# Patient Record
Sex: MALE | Race: BLACK OR AFRICAN AMERICAN | NOT HISPANIC OR LATINO | Employment: OTHER | RURAL
[De-identification: names, ages, dates, MRNs, and addresses within clinical notes are randomized per-mention and may not be internally consistent; named-entity substitution may affect disease eponyms.]

---

## 2019-05-14 ENCOUNTER — HISTORICAL (OUTPATIENT)
Dept: ADMINISTRATIVE | Facility: HOSPITAL | Age: 64
End: 2019-05-14

## 2019-05-15 LAB
LAB AP CLINICAL INFORMATION: NORMAL
LAB AP DIAGNOSIS - HISTORICAL: NORMAL
LAB AP GROSS PATHOLOGY - HISTORICAL: NORMAL
LAB AP SPECIMEN SUBMITTED - HISTORICAL: NORMAL

## 2020-04-04 ENCOUNTER — HISTORICAL (OUTPATIENT)
Dept: ADMINISTRATIVE | Facility: HOSPITAL | Age: 65
End: 2020-04-04

## 2020-04-04 LAB
ALBUMIN SERPL BCP-MCNC: 3.4 G/DL (ref 3.5–5)
ALBUMIN/GLOB SERPL: 0.7 {RATIO}
ALP SERPL-CCNC: 129 U/L (ref 45–115)
ALT SERPL W P-5'-P-CCNC: 24 U/L (ref 16–61)
AST SERPL W P-5'-P-CCNC: 14 U/L (ref 15–37)
BASOPHILS # BLD AUTO: 0.03 X10E3/UL (ref 0–0.2)
BASOPHILS NFR BLD AUTO: 0.2 % (ref 0–1)
BILIRUB SERPL-MCNC: 0.9 MG/DL (ref 0–1.2)
BUN SERPL-MCNC: 16 MG/DL (ref 7–18)
BUN/CREAT SERPL: 15.1
CALCIUM SERPL-MCNC: 8.7 MG/DL (ref 8.5–10.1)
CHLORIDE SERPL-SCNC: 94 MMOL/L (ref 98–107)
CO2 SERPL-SCNC: 26 MMOL/L (ref 21–32)
CREAT SERPL-MCNC: 1.06 MG/DL (ref 0.7–1.3)
EOSINOPHIL # BLD AUTO: 0.04 X10E3/UL (ref 0–0.5)
EOSINOPHIL NFR BLD AUTO: 0.2 % (ref 1–4)
ERYTHROCYTE [DISTWIDTH] IN BLOOD BY AUTOMATED COUNT: 12.3 % (ref 11.5–14.5)
GLOBULIN SER-MCNC: 4.6 G/DL (ref 2–4)
GLUCOSE SERPL-MCNC: 153 MG/DL (ref 70–105)
GLUCOSE SERPL-MCNC: 224 MG/DL (ref 70–105)
GLUCOSE SERPL-MCNC: 271 MG/DL (ref 74–106)
HCT VFR BLD AUTO: 41.7 % (ref 40–54)
HGB BLD-MCNC: 14.6 G/DL (ref 13.5–18)
IMM GRANULOCYTES # BLD AUTO: 0.05 X10E3/UL (ref 0–0.04)
IMM GRANULOCYTES NFR BLD: 0.3 % (ref 0–0.4)
LACTATE SERPL-SCNC: 1.3 MMOL/L (ref 0.4–2)
LYMPHOCYTES # BLD AUTO: 1.89 X10E3/UL (ref 1–4.8)
LYMPHOCYTES NFR BLD AUTO: 10.3 % (ref 27–41)
MAGNESIUM SERPL-MCNC: 1.8 MG/DL (ref 1.7–2.3)
MCH RBC QN AUTO: 30.9 PG (ref 27–31)
MCHC RBC AUTO-ENTMCNC: 35 G/DL (ref 32–36)
MCV RBC AUTO: 88.2 FL (ref 80–96)
MONOCYTES # BLD AUTO: 1.87 X10E3/UL (ref 0–0.8)
MONOCYTES NFR BLD AUTO: 10.2 % (ref 2–6)
MPC BLD CALC-MCNC: 9.9 FL (ref 9.4–12.4)
NEUTROPHILS # BLD AUTO: 14.51 X10E3/UL (ref 1.8–7.7)
NEUTROPHILS NFR BLD AUTO: 78.8 % (ref 53–65)
PLATELET # BLD AUTO: 282 X10E3/UL (ref 150–400)
POTASSIUM SERPL-SCNC: 3.8 MMOL/L (ref 3.5–5.1)
PROT SERPL-MCNC: 8 G/DL (ref 6.4–8.2)
RBC # BLD AUTO: 4.73 X10E6/UL (ref 4.6–6.2)
SODIUM SERPL-SCNC: 131 MMOL/L (ref 136–145)
WBC # BLD AUTO: 18.39 X10E3/UL (ref 4.5–11)

## 2020-04-05 ENCOUNTER — HISTORICAL (OUTPATIENT)
Dept: ADMINISTRATIVE | Facility: HOSPITAL | Age: 65
End: 2020-04-05

## 2020-04-05 LAB
BASOPHILS # BLD AUTO: 0.03 X10E3/UL (ref 0–0.2)
BASOPHILS NFR BLD AUTO: 0.2 % (ref 0–1)
BUN SERPL-MCNC: 12 MG/DL (ref 7–18)
BUN SERPL-MCNC: 13 MG/DL (ref 7–18)
CALCIUM SERPL-MCNC: 8.7 MG/DL (ref 8.5–10.1)
CALCIUM SERPL-MCNC: 9 MG/DL (ref 8.5–10.1)
CHLORIDE SERPL-SCNC: 100 MMOL/L (ref 98–107)
CHLORIDE SERPL-SCNC: 98 MMOL/L (ref 98–107)
CO2 SERPL-SCNC: 27 MMOL/L (ref 21–32)
CO2 SERPL-SCNC: 27 MMOL/L (ref 21–32)
CREAT SERPL-MCNC: 0.97 MG/DL (ref 0.7–1.3)
CREAT SERPL-MCNC: 0.99 MG/DL (ref 0.7–1.3)
EOSINOPHIL # BLD AUTO: 0.11 X10E3/UL (ref 0–0.5)
EOSINOPHIL NFR BLD AUTO: 0.7 % (ref 1–4)
ERYTHROCYTE [DISTWIDTH] IN BLOOD BY AUTOMATED COUNT: 12.2 % (ref 11.5–14.5)
GLUCOSE SERPL-MCNC: 121 MG/DL (ref 74–106)
GLUCOSE SERPL-MCNC: 140 MG/DL (ref 74–106)
GLUCOSE SERPL-MCNC: 173 MG/DL (ref 70–105)
GLUCOSE SERPL-MCNC: 202 MG/DL (ref 70–105)
GLUCOSE SERPL-MCNC: 95 MG/DL (ref 70–105)
HCT VFR BLD AUTO: 37.1 % (ref 40–54)
HGB BLD-MCNC: 13 G/DL (ref 13.5–18)
IMM GRANULOCYTES # BLD AUTO: 0.05 X10E3/UL (ref 0–0.04)
IMM GRANULOCYTES NFR BLD: 0.3 % (ref 0–0.4)
LYMPHOCYTES # BLD AUTO: 2.24 X10E3/UL (ref 1–4.8)
LYMPHOCYTES NFR BLD AUTO: 13.9 % (ref 27–41)
MCH RBC QN AUTO: 30.9 PG (ref 27–31)
MCHC RBC AUTO-ENTMCNC: 35 G/DL (ref 32–36)
MCV RBC AUTO: 88.1 FL (ref 80–96)
MONOCYTES # BLD AUTO: 1.86 X10E3/UL (ref 0–0.8)
MONOCYTES NFR BLD AUTO: 11.5 % (ref 2–6)
MPC BLD CALC-MCNC: 9.5 FL (ref 9.4–12.4)
NEUTROPHILS # BLD AUTO: 11.82 X10E3/UL (ref 1.8–7.7)
NEUTROPHILS NFR BLD AUTO: 73.4 % (ref 53–65)
PLATELET # BLD AUTO: 278 X10E3/UL (ref 150–400)
POTASSIUM SERPL-SCNC: 3.3 MMOL/L (ref 3.5–5.1)
POTASSIUM SERPL-SCNC: 3.4 MMOL/L (ref 3.5–5.1)
RBC # BLD AUTO: 4.21 X10E6/UL (ref 4.6–6.2)
SODIUM SERPL-SCNC: 133 MMOL/L (ref 136–145)
SODIUM SERPL-SCNC: 136 MMOL/L (ref 136–145)
WBC # BLD AUTO: 16.11 X10E3/UL (ref 4.5–11)

## 2020-04-06 ENCOUNTER — HISTORICAL (OUTPATIENT)
Dept: ADMINISTRATIVE | Facility: HOSPITAL | Age: 65
End: 2020-04-06

## 2020-04-06 LAB
BASOPHILS # BLD AUTO: 0.03 X10E3/UL (ref 0–0.2)
BASOPHILS NFR BLD AUTO: 0.2 % (ref 0–1)
BUN SERPL-MCNC: 11 MG/DL (ref 7–18)
CALCIUM SERPL-MCNC: 9.1 MG/DL (ref 8.5–10.1)
CHLORIDE SERPL-SCNC: 99 MMOL/L (ref 98–107)
CO2 SERPL-SCNC: 28 MMOL/L (ref 21–32)
CREAT SERPL-MCNC: 1.04 MG/DL (ref 0.7–1.3)
EOSINOPHIL # BLD AUTO: 0.05 X10E3/UL (ref 0–0.5)
EOSINOPHIL NFR BLD AUTO: 0.3 % (ref 1–4)
ERYTHROCYTE [DISTWIDTH] IN BLOOD BY AUTOMATED COUNT: 12.1 % (ref 11.5–14.5)
GLUCOSE SERPL-MCNC: 222 MG/DL (ref 70–105)
GLUCOSE SERPL-MCNC: 68 MG/DL (ref 74–106)
HCT VFR BLD AUTO: 36.8 % (ref 40–54)
HGB BLD-MCNC: 12.7 G/DL (ref 13.5–18)
IMM GRANULOCYTES # BLD AUTO: 0.05 X10E3/UL (ref 0–0.04)
IMM GRANULOCYTES NFR BLD: 0.3 % (ref 0–0.4)
LYMPHOCYTES # BLD AUTO: 1.33 X10E3/UL (ref 1–4.8)
LYMPHOCYTES NFR BLD AUTO: 7.6 % (ref 27–41)
MCH RBC QN AUTO: 30.5 PG (ref 27–31)
MCHC RBC AUTO-ENTMCNC: 34.5 G/DL (ref 32–36)
MCV RBC AUTO: 88.2 FL (ref 80–96)
MONOCYTES # BLD AUTO: 2.06 X10E3/UL (ref 0–0.8)
MONOCYTES NFR BLD AUTO: 11.7 % (ref 2–6)
MPC BLD CALC-MCNC: 9.6 FL (ref 9.4–12.4)
NEUTROPHILS # BLD AUTO: 14.05 X10E3/UL (ref 1.8–7.7)
NEUTROPHILS NFR BLD AUTO: 79.9 % (ref 53–65)
PLATELET # BLD AUTO: 309 X10E3/UL (ref 150–400)
POTASSIUM SERPL-SCNC: 3.2 MMOL/L (ref 3.5–5.1)
RBC # BLD AUTO: 4.17 X10E6/UL (ref 4.6–6.2)
SODIUM SERPL-SCNC: 135 MMOL/L (ref 136–145)
WBC # BLD AUTO: 17.57 X10E3/UL (ref 4.5–11)

## 2020-04-07 ENCOUNTER — HISTORICAL (OUTPATIENT)
Dept: ADMINISTRATIVE | Facility: HOSPITAL | Age: 65
End: 2020-04-07

## 2020-04-08 ENCOUNTER — HISTORICAL (OUTPATIENT)
Dept: ADMINISTRATIVE | Facility: HOSPITAL | Age: 65
End: 2020-04-08

## 2020-04-08 LAB
BASOPHILS # BLD AUTO: 0.08 X10E3/UL (ref 0–0.2)
BASOPHILS NFR BLD AUTO: 0.6 % (ref 0–1)
BUN SERPL-MCNC: 10 MG/DL (ref 7–18)
CALCIUM SERPL-MCNC: 8.5 MG/DL (ref 8.5–10.1)
CHLORIDE SERPL-SCNC: 101 MMOL/L (ref 98–107)
CO2 SERPL-SCNC: 29 MMOL/L (ref 21–32)
CREAT SERPL-MCNC: 0.84 MG/DL (ref 0.7–1.3)
EOSINOPHIL # BLD AUTO: 0.34 X10E3/UL (ref 0–0.5)
EOSINOPHIL NFR BLD AUTO: 2.6 % (ref 1–4)
ERYTHROCYTE [DISTWIDTH] IN BLOOD BY AUTOMATED COUNT: 11.9 % (ref 11.5–14.5)
GLUCOSE SERPL-MCNC: 174 MG/DL (ref 70–105)
GLUCOSE SERPL-MCNC: 85 MG/DL (ref 74–106)
HCT VFR BLD AUTO: 35.6 % (ref 40–54)
HGB BLD-MCNC: 11.7 G/DL (ref 13.5–18)
IMM GRANULOCYTES # BLD AUTO: 0.09 X10E3/UL (ref 0–0.04)
IMM GRANULOCYTES NFR BLD: 0.7 % (ref 0–0.4)
LYMPHOCYTES # BLD AUTO: 1.95 X10E3/UL (ref 1–4.8)
LYMPHOCYTES NFR BLD AUTO: 14.9 % (ref 27–41)
MCH RBC QN AUTO: 29.9 PG (ref 27–31)
MCHC RBC AUTO-ENTMCNC: 32.9 G/DL (ref 32–36)
MCV RBC AUTO: 91 FL (ref 80–96)
MONOCYTES # BLD AUTO: 1.59 X10E3/UL (ref 0–0.8)
MONOCYTES NFR BLD AUTO: 12.1 % (ref 2–6)
MPC BLD CALC-MCNC: 9.6 FL (ref 9.4–12.4)
NEUTROPHILS # BLD AUTO: 9.05 X10E3/UL (ref 1.8–7.7)
NEUTROPHILS NFR BLD AUTO: 69.1 % (ref 53–65)
NRBC # BLD AUTO: 0 X10E3/UL (ref 0–0)
NRBC, AUTO (.00): 0 /100 (ref 0–0)
PLATELET # BLD AUTO: 300 X10E3/UL (ref 150–400)
POTASSIUM SERPL-SCNC: 3.6 MMOL/L (ref 3.5–5.1)
RBC # BLD AUTO: 3.91 X10E6/UL (ref 4.6–6.2)
SODIUM SERPL-SCNC: 136 MMOL/L (ref 136–145)
WBC # BLD AUTO: 13.1 X10E3/UL (ref 4.5–11)

## 2020-04-10 LAB
REPORT: 25
REPORT: NORMAL

## 2020-04-13 LAB

## 2020-05-05 ENCOUNTER — HISTORICAL (OUTPATIENT)
Dept: ADMINISTRATIVE | Facility: HOSPITAL | Age: 65
End: 2020-05-05

## 2020-05-05 LAB
BASOPHILS # BLD AUTO: 0.05 X10E3/UL (ref 0–0.2)
BASOPHILS NFR BLD AUTO: 0.3 % (ref 0–1)
BUN SERPL-MCNC: 19 MG/DL (ref 7–18)
CALCIUM SERPL-MCNC: 9 MG/DL (ref 8.5–10.1)
CHLORIDE SERPL-SCNC: 96 MMOL/L (ref 98–107)
CO2 SERPL-SCNC: 28 MMOL/L (ref 21–32)
CREAT SERPL-MCNC: 1.09 MG/DL (ref 0.7–1.3)
EOSINOPHIL # BLD AUTO: 0.06 X10E3/UL (ref 0–0.5)
EOSINOPHIL NFR BLD AUTO: 0.3 % (ref 1–4)
ERYTHROCYTE [DISTWIDTH] IN BLOOD BY AUTOMATED COUNT: 12.3 % (ref 11.5–14.5)
GLUCOSE SERPL-MCNC: 194 MG/DL (ref 70–105)
GLUCOSE SERPL-MCNC: 221 MG/DL (ref 70–105)
GLUCOSE SERPL-MCNC: 225 MG/DL (ref 70–105)
GLUCOSE SERPL-MCNC: 228 MG/DL (ref 74–106)
HCT VFR BLD AUTO: 36.5 % (ref 40–54)
HGB BLD-MCNC: 12.1 G/DL (ref 13.5–18)
IMM GRANULOCYTES # BLD AUTO: 0.17 X10E3/UL (ref 0–0.04)
IMM GRANULOCYTES NFR BLD: 0.9 % (ref 0–0.4)
LYMPHOCYTES # BLD AUTO: 1.85 X10E3/UL (ref 1–4.8)
LYMPHOCYTES NFR BLD AUTO: 10 % (ref 27–41)
MCH RBC QN AUTO: 29.8 PG (ref 27–31)
MCHC RBC AUTO-ENTMCNC: 33.2 G/DL (ref 32–36)
MCV RBC AUTO: 89.9 FL (ref 80–96)
MONOCYTES # BLD AUTO: 1.72 X10E3/UL (ref 0–0.8)
MONOCYTES NFR BLD AUTO: 9.3 % (ref 2–6)
MPC BLD CALC-MCNC: 9 FL (ref 9.4–12.4)
NEUTROPHILS # BLD AUTO: 14.63 X10E3/UL (ref 1.8–7.7)
NEUTROPHILS NFR BLD AUTO: 79.2 % (ref 53–65)
NRBC # BLD AUTO: 0 X10E3/UL (ref 0–0)
NRBC, AUTO (.00): 0 /100 (ref 0–0)
PLATELET # BLD AUTO: 419 X10E3/UL (ref 150–400)
POTASSIUM SERPL-SCNC: 4.1 MMOL/L (ref 3.5–5.1)
RBC # BLD AUTO: 4.06 X10E6/UL (ref 4.6–6.2)
SODIUM SERPL-SCNC: 131 MMOL/L (ref 136–145)
WBC # BLD AUTO: 18.48 X10E3/UL (ref 4.5–11)

## 2020-05-10 ENCOUNTER — HISTORICAL (OUTPATIENT)
Dept: ADMINISTRATIVE | Facility: HOSPITAL | Age: 65
End: 2020-05-10

## 2020-05-10 LAB
ALBUMIN SERPL BCP-MCNC: 2.3 G/DL (ref 3.5–5)
ALBUMIN/GLOB SERPL: 0.5 {RATIO}
ALP SERPL-CCNC: 140 U/L (ref 45–115)
ALT SERPL W P-5'-P-CCNC: 60 U/L (ref 16–61)
APTT PPP: 41.2 SECONDS (ref 25.2–37.3)
AST SERPL W P-5'-P-CCNC: 71 U/L (ref 15–37)
BASOPHILS # BLD AUTO: 0.09 X10E3/UL (ref 0–0.2)
BASOPHILS NFR BLD AUTO: 0.3 % (ref 0–1)
BILIRUB SERPL-MCNC: 0.7 MG/DL (ref 0–1.2)
BUN SERPL-MCNC: 16 MG/DL (ref 7–18)
BUN/CREAT SERPL: 13.8
CALCIUM SERPL-MCNC: 8.3 MG/DL (ref 8.5–10.1)
CHLORIDE SERPL-SCNC: 87 MMOL/L (ref 98–107)
CO2 SERPL-SCNC: 26 MMOL/L (ref 21–32)
CREAT SERPL-MCNC: 1.16 MG/DL (ref 0.7–1.3)
CRP SERPL-MCNC: >18 UG/ML (ref 0–0.8)
EOSINOPHIL # BLD AUTO: 0.01 X10E3/UL (ref 0–0.5)
EOSINOPHIL NFR BLD AUTO: 0 % (ref 1–4)
EOSINOPHIL NFR BLD MANUAL: 1 % (ref 1–4)
ERYTHROCYTE [DISTWIDTH] IN BLOOD BY AUTOMATED COUNT: 12.2 % (ref 11.5–14.5)
GLOBULIN SER-MCNC: 5 G/DL (ref 2–4)
GLUCOSE SERPL-MCNC: 202 MG/DL (ref 74–106)
GLUCOSE SERPL-MCNC: 222 MG/DL (ref 70–105)
HCT VFR BLD AUTO: 32.1 % (ref 40–54)
HGB BLD-MCNC: 10.8 G/DL (ref 13.5–18)
IMM GRANULOCYTES # BLD AUTO: 1.35 X10E3/UL (ref 0–0.04)
IMM GRANULOCYTES NFR BLD: 4.5 % (ref 0–0.4)
INR BLD: 1.22 (ref 0–3.3)
LACTATE SERPL-SCNC: 1.4 MMOL/L (ref 0.4–2)
LYMPHOCYTES # BLD AUTO: 1.05 X10E3/UL (ref 1–4.8)
LYMPHOCYTES NFR BLD AUTO: 3.5 % (ref 27–41)
LYMPHOCYTES NFR BLD MANUAL: 4 % (ref 27–41)
MCH RBC QN AUTO: 29.2 PG (ref 27–31)
MCHC RBC AUTO-ENTMCNC: 33.6 G/DL (ref 32–36)
MCV RBC AUTO: 86.8 FL (ref 80–96)
MONOCYTES # BLD AUTO: 2.05 X10E3/UL (ref 0–0.8)
MONOCYTES NFR BLD AUTO: 6.9 % (ref 2–6)
MONOCYTES NFR BLD MANUAL: 5 % (ref 2–6)
MPC BLD CALC-MCNC: 9.2 FL (ref 9.4–12.4)
NEUTROPHILS # BLD AUTO: 25.18 X10E3/UL (ref 1.8–7.7)
NEUTROPHILS NFR BLD AUTO: 84.8 % (ref 53–65)
NEUTS BAND NFR BLD MANUAL: 7 % (ref 1–5)
NEUTS SEG NFR BLD MANUAL: 83 % (ref 50–62)
NRBC # BLD AUTO: 0 X10E3/UL (ref 0–0)
NRBC, AUTO (.00): 0 /100 (ref 0–0)
OVALOCYTES BLD QL SMEAR: ABNORMAL
PLATELET # BLD AUTO: 512 X10E3/UL (ref 150–400)
PLATELET MORPHOLOGY: ABNORMAL
POLYCHROMASIA BLD QL SMEAR: ABNORMAL
POTASSIUM SERPL-SCNC: 3.7 MMOL/L (ref 3.5–5.1)
PROT SERPL-MCNC: 7.3 G/DL (ref 6.4–8.2)
PROTHROMBIN TIME: 15.5 SECONDS (ref 11.7–14.7)
RBC # BLD AUTO: 3.7 X10E6/UL (ref 4.6–6.2)
SODIUM SERPL-SCNC: 122 MMOL/L (ref 136–145)
WBC # BLD AUTO: 29.73 X10E3/UL (ref 4.5–11)

## 2020-05-11 ENCOUNTER — HISTORICAL (OUTPATIENT)
Dept: ADMINISTRATIVE | Facility: HOSPITAL | Age: 65
End: 2020-05-11

## 2020-05-11 LAB
ERYTHROCYTE [SEDIMENTATION RATE] IN BLOOD BY WESTERGREN METHOD: 106 MM/HR (ref 0–20)
GLUCOSE SERPL-MCNC: 133 MG/DL (ref 70–105)
GLUCOSE SERPL-MCNC: 139 MG/DL (ref 70–105)
GLUCOSE SERPL-MCNC: 196 MG/DL (ref 70–105)
GLUCOSE SERPL-MCNC: 219 MG/DL (ref 70–105)
GLUCOSE SERPL-MCNC: 221 MG/DL (ref 70–105)

## 2020-05-12 ENCOUNTER — HISTORICAL (OUTPATIENT)
Dept: ADMINISTRATIVE | Facility: HOSPITAL | Age: 65
End: 2020-05-12

## 2020-05-12 LAB
BASOPHILS # BLD AUTO: 0.08 X10E3/UL (ref 0–0.2)
BASOPHILS NFR BLD AUTO: 0.3 % (ref 0–1)
BUN SERPL-MCNC: 8 MG/DL (ref 7–18)
CALCIUM SERPL-MCNC: 7.7 MG/DL (ref 8.5–10.1)
CHLORIDE SERPL-SCNC: 98 MMOL/L (ref 98–107)
CO2 SERPL-SCNC: 28 MMOL/L (ref 21–32)
CREAT SERPL-MCNC: 0.83 MG/DL (ref 0.7–1.3)
EOSINOPHIL # BLD AUTO: 0.2 X10E3/UL (ref 0–0.5)
EOSINOPHIL NFR BLD AUTO: 0.8 % (ref 1–4)
ERYTHROCYTE [DISTWIDTH] IN BLOOD BY AUTOMATED COUNT: 12.5 % (ref 11.5–14.5)
GLUCOSE SERPL-MCNC: 121 MG/DL (ref 70–105)
GLUCOSE SERPL-MCNC: 132 MG/DL (ref 70–105)
GLUCOSE SERPL-MCNC: 132 MG/DL (ref 70–105)
GLUCOSE SERPL-MCNC: 181 MG/DL (ref 70–105)
GLUCOSE SERPL-MCNC: 239 MG/DL (ref 70–105)
GLUCOSE SERPL-MCNC: 85 MG/DL (ref 74–106)
GLUCOSE SERPL-MCNC: 99 MG/DL (ref 70–105)
HCT VFR BLD AUTO: 28.3 % (ref 40–54)
HGB BLD-MCNC: 9.2 G/DL (ref 13.5–18)
IMM GRANULOCYTES # BLD AUTO: 0.5 X10E3/UL (ref 0–0.04)
IMM GRANULOCYTES NFR BLD: 2.1 % (ref 0–0.4)
INSULIN SERPL-ACNC: NORMAL U[IU]/ML
LAB AP CLINICAL INFORMATION: NORMAL
LAB AP DIAGNOSIS - HISTORICAL: NORMAL
LAB AP GROSS PATHOLOGY - HISTORICAL: NORMAL
LAB AP SPECIMEN SUBMITTED - HISTORICAL: NORMAL
LYMPHOCYTES # BLD AUTO: 2 X10E3/UL (ref 1–4.8)
LYMPHOCYTES NFR BLD AUTO: 8.3 % (ref 27–41)
MCH RBC QN AUTO: 29.1 PG (ref 27–31)
MCHC RBC AUTO-ENTMCNC: 32.5 G/DL (ref 32–36)
MCV RBC AUTO: 89.6 FL (ref 80–96)
MONOCYTES # BLD AUTO: 1.63 X10E3/UL (ref 0–0.8)
MONOCYTES NFR BLD AUTO: 6.7 % (ref 2–6)
MPC BLD CALC-MCNC: 9.4 FL (ref 9.4–12.4)
NEUTROPHILS # BLD AUTO: 19.76 X10E3/UL (ref 1.8–7.7)
NEUTROPHILS NFR BLD AUTO: 81.8 % (ref 53–65)
NRBC # BLD AUTO: 0 X10E3/UL (ref 0–0)
NRBC, AUTO (.00): 0 /100 (ref 0–0)
PLATELET # BLD AUTO: 505 X10E3/UL (ref 150–400)
POTASSIUM SERPL-SCNC: 3.6 MMOL/L (ref 3.5–5.1)
RBC # BLD AUTO: 3.16 X10E6/UL (ref 4.6–6.2)
SODIUM SERPL-SCNC: 134 MMOL/L (ref 136–145)
WBC # BLD AUTO: 24.17 X10E3/UL (ref 4.5–11)

## 2020-05-13 ENCOUNTER — HISTORICAL (OUTPATIENT)
Dept: ADMINISTRATIVE | Facility: HOSPITAL | Age: 65
End: 2020-05-13

## 2020-05-13 LAB
GLUCOSE SERPL-MCNC: 134 MG/DL (ref 70–105)
GLUCOSE SERPL-MCNC: 156 MG/DL (ref 70–105)
GLUCOSE SERPL-MCNC: 157 MG/DL (ref 70–105)
GLUCOSE SERPL-MCNC: 197 MG/DL (ref 70–105)
VANCOMYCIN TROUGH SERPL-MCNC: 5.9 UG/ML (ref 10–20)

## 2020-05-14 ENCOUNTER — HISTORICAL (OUTPATIENT)
Dept: ADMINISTRATIVE | Facility: HOSPITAL | Age: 65
End: 2020-05-14

## 2020-05-14 LAB
GLUCOSE SERPL-MCNC: 191 MG/DL (ref 70–105)
REPORT: 25
REPORT: 25
REPORT: NORMAL

## 2020-05-15 LAB
REPORT: NORMAL

## 2020-05-16 LAB
REPORT: 25
REPORT: NORMAL

## 2020-05-19 LAB
REPORT: NORMAL

## 2020-05-20 LAB
REPORT: NORMAL

## 2020-08-03 ENCOUNTER — HISTORICAL (OUTPATIENT)
Dept: ADMINISTRATIVE | Facility: HOSPITAL | Age: 65
End: 2020-08-03

## 2020-08-03 LAB
GLUCOSE SERPL-MCNC: 141 MG/DL (ref 70–105)
GLUCOSE SERPL-MCNC: 165 MG/DL (ref 70–105)

## 2020-08-24 ENCOUNTER — HISTORICAL (OUTPATIENT)
Dept: ADMINISTRATIVE | Facility: HOSPITAL | Age: 65
End: 2020-08-24

## 2020-08-24 LAB
ALBUMIN SERPL BCP-MCNC: 2.9 G/DL (ref 3.5–5)
ALP SERPL-CCNC: 144 U/L (ref 45–115)
ALT SERPL W P-5'-P-CCNC: 19 U/L (ref 16–61)
ANION GAP SERPL CALCULATED.3IONS-SCNC: 8 MMOL/L (ref 7–16)
AST SERPL W P-5'-P-CCNC: 14 U/L (ref 15–37)
BILIRUB DIRECT SERPL-MCNC: <0.1 MG/DL (ref 0–0.2)
BILIRUB SERPL-MCNC: 0.1 MG/DL (ref 0–1.2)
BUN SERPL-MCNC: 12 MG/DL (ref 7–18)
CALCIUM SERPL-MCNC: 8.9 MG/DL (ref 8.5–10.1)
CHLORIDE SERPL-SCNC: 104 MMOL/L (ref 98–107)
CO2 SERPL-SCNC: 30 MMOL/L (ref 21–32)
CREAT SERPL-MCNC: 0.76 MG/DL (ref 0.7–1.3)
EST. AVERAGE GLUCOSE BLD GHB EST-MCNC: 167 MG/DL
GLUCOSE SERPL-MCNC: 180 MG/DL (ref 74–106)
HBA1C MFR BLD HPLC: 7.6 % (ref 4.5–6.6)
POTASSIUM SERPL-SCNC: 4.2 MMOL/L (ref 3.5–5.1)
PROT SERPL-MCNC: 8 G/DL (ref 6.4–8.2)
PSA SERPL-MCNC: 1.42 NG/ML (ref 0–4.1)
SODIUM SERPL-SCNC: 138 MMOL/L (ref 136–145)

## 2021-04-01 ENCOUNTER — OFFICE VISIT (OUTPATIENT)
Dept: WOUND CARE | Facility: HOSPITAL | Age: 66
End: 2021-04-01
Attending: NURSE PRACTITIONER
Payer: COMMERCIAL

## 2021-04-01 VITALS
HEART RATE: 80 BPM | HEIGHT: 70 IN | TEMPERATURE: 97 F | SYSTOLIC BLOOD PRESSURE: 136 MMHG | BODY MASS INDEX: 24.34 KG/M2 | WEIGHT: 170 LBS | DIASTOLIC BLOOD PRESSURE: 80 MMHG

## 2021-04-01 DIAGNOSIS — L97.512 NON-PRESSURE CHRONIC ULCER OF OTHER PART OF RIGHT FOOT WITH FAT LAYER EXPOSED: Primary | ICD-10-CM

## 2021-04-01 DIAGNOSIS — E08.621 DIABETIC ULCER OF MIDFOOT ASSOCIATED WITH DIABETES MELLITUS DUE TO UNDERLYING CONDITION, WITH NECROSIS OF BONE, UNSPECIFIED LATERALITY: ICD-10-CM

## 2021-04-01 DIAGNOSIS — D64.9 ANEMIA, UNSPECIFIED TYPE: ICD-10-CM

## 2021-04-01 DIAGNOSIS — L97.404 DIABETIC ULCER OF MIDFOOT ASSOCIATED WITH DIABETES MELLITUS DUE TO UNDERLYING CONDITION, WITH NECROSIS OF BONE, UNSPECIFIED LATERALITY: ICD-10-CM

## 2021-04-01 DIAGNOSIS — I73.9 PERIPHERAL VASCULAR DISEASE, UNSPECIFIED: ICD-10-CM

## 2021-04-01 PROCEDURE — 11042 PR DEBRIDEMENT, SKIN, SUB-Q TISSUE,=<20 SQ CM: ICD-10-PCS | Mod: ,,, | Performed by: NURSE PRACTITIONER

## 2021-04-01 PROCEDURE — 11042 DBRDMT SUBQ TIS 1ST 20SQCM/<: CPT | Mod: ,,, | Performed by: NURSE PRACTITIONER

## 2021-04-01 PROCEDURE — 99212 OFFICE O/P EST SF 10 MIN: CPT

## 2021-04-02 PROBLEM — L97.512 NON-PRESSURE CHRONIC ULCER OF OTHER PART OF RIGHT FOOT WITH FAT LAYER EXPOSED: Status: ACTIVE | Noted: 2021-04-02

## 2021-04-02 PROBLEM — E08.621: Status: ACTIVE | Noted: 2021-04-02

## 2021-04-02 PROBLEM — I73.9 PERIPHERAL VASCULAR DISEASE, UNSPECIFIED: Status: ACTIVE | Noted: 2021-04-02

## 2021-04-02 PROBLEM — L97.404: Status: ACTIVE | Noted: 2021-04-02

## 2021-04-02 PROBLEM — D64.9 ANEMIA: Status: ACTIVE | Noted: 2021-04-02

## 2021-04-14 ENCOUNTER — OFFICE VISIT (OUTPATIENT)
Dept: WOUND CARE | Facility: HOSPITAL | Age: 66
End: 2021-04-14
Attending: NURSE PRACTITIONER
Payer: COMMERCIAL

## 2021-04-14 VITALS
HEIGHT: 70 IN | SYSTOLIC BLOOD PRESSURE: 136 MMHG | DIASTOLIC BLOOD PRESSURE: 78 MMHG | RESPIRATION RATE: 18 BRPM | WEIGHT: 170 LBS | HEART RATE: 82 BPM | BODY MASS INDEX: 24.34 KG/M2

## 2021-04-14 DIAGNOSIS — E11.621 TYPE 2 DIABETES MELLITUS WITH FOOT ULCER, UNSPECIFIED WHETHER LONG TERM INSULIN USE: Primary | ICD-10-CM

## 2021-04-14 DIAGNOSIS — I73.9 PERIPHERAL VASCULAR DISEASE, UNSPECIFIED: ICD-10-CM

## 2021-04-14 DIAGNOSIS — L97.512 NON-PRESSURE CHRONIC ULCER OF OTHER PART OF RIGHT FOOT WITH FAT LAYER EXPOSED: ICD-10-CM

## 2021-04-14 DIAGNOSIS — D64.9 ANEMIA, UNSPECIFIED TYPE: ICD-10-CM

## 2021-04-14 DIAGNOSIS — L97.509 TYPE 2 DIABETES MELLITUS WITH FOOT ULCER, UNSPECIFIED WHETHER LONG TERM INSULIN USE: Primary | ICD-10-CM

## 2021-04-14 PROCEDURE — 11042 PR DEBRIDEMENT, SKIN, SUB-Q TISSUE,=<20 SQ CM: ICD-10-PCS | Mod: ,,, | Performed by: NURSE PRACTITIONER

## 2021-04-14 PROCEDURE — 11042 DBRDMT SUBQ TIS 1ST 20SQCM/<: CPT | Mod: ,,, | Performed by: NURSE PRACTITIONER

## 2021-04-14 PROCEDURE — 87075 CULTR BACTERIA EXCEPT BLOOD: CPT

## 2021-04-14 PROCEDURE — 11042 DBRDMT SUBQ TIS 1ST 20SQCM/<: CPT

## 2021-04-14 PROCEDURE — 27000272 HC BANDAGE KERLIX

## 2021-04-14 PROCEDURE — 87070 CULTURE OTHR SPECIMN AEROBIC: CPT

## 2021-04-14 PROCEDURE — 87076 CULTURE ANAEROBE IDENT EACH: CPT

## 2021-04-14 PROCEDURE — 87077 CULTURE AEROBIC IDENTIFY: CPT | Mod: 91

## 2021-04-17 LAB — REPORT: NORMAL

## 2021-04-18 LAB — REPORT: NORMAL

## 2021-04-21 ENCOUNTER — OFFICE VISIT (OUTPATIENT)
Dept: WOUND CARE | Facility: HOSPITAL | Age: 66
End: 2021-04-21
Attending: NURSE PRACTITIONER
Payer: COMMERCIAL

## 2021-04-21 VITALS
DIASTOLIC BLOOD PRESSURE: 89 MMHG | HEART RATE: 83 BPM | WEIGHT: 170 LBS | HEIGHT: 70 IN | BODY MASS INDEX: 24.34 KG/M2 | SYSTOLIC BLOOD PRESSURE: 147 MMHG | TEMPERATURE: 97 F

## 2021-04-21 DIAGNOSIS — L97.512 NON-PRESSURE CHRONIC ULCER OF OTHER PART OF RIGHT FOOT WITH FAT LAYER EXPOSED: Primary | ICD-10-CM

## 2021-04-21 DIAGNOSIS — I73.9 PERIPHERAL VASCULAR DISEASE, UNSPECIFIED: ICD-10-CM

## 2021-04-21 DIAGNOSIS — L97.414 DIABETIC ULCER OF RIGHT MIDFOOT ASSOCIATED WITH DIABETES MELLITUS DUE TO UNDERLYING CONDITION, WITH NECROSIS OF BONE: ICD-10-CM

## 2021-04-21 DIAGNOSIS — E08.621 DIABETIC ULCER OF RIGHT MIDFOOT ASSOCIATED WITH DIABETES MELLITUS DUE TO UNDERLYING CONDITION, WITH NECROSIS OF BONE: ICD-10-CM

## 2021-04-21 DIAGNOSIS — D64.9 ANEMIA, UNSPECIFIED TYPE: ICD-10-CM

## 2021-04-21 PROCEDURE — 99214 OFFICE O/P EST MOD 30 MIN: CPT

## 2021-04-21 PROCEDURE — 99214 OFFICE O/P EST MOD 30 MIN: CPT | Mod: ,,, | Performed by: NURSE PRACTITIONER

## 2021-04-21 PROCEDURE — 99214 PR OFFICE/OUTPT VISIT, EST, LEVL IV, 30-39 MIN: ICD-10-PCS | Mod: ,,, | Performed by: NURSE PRACTITIONER

## 2021-04-21 PROCEDURE — 27000272 HC BANDAGE KERLIX

## 2021-05-05 ENCOUNTER — OFFICE VISIT (OUTPATIENT)
Dept: WOUND CARE | Facility: HOSPITAL | Age: 66
End: 2021-05-05
Attending: FAMILY MEDICINE
Payer: COMMERCIAL

## 2021-05-05 VITALS
DIASTOLIC BLOOD PRESSURE: 83 MMHG | WEIGHT: 170 LBS | BODY MASS INDEX: 24.34 KG/M2 | HEIGHT: 70 IN | SYSTOLIC BLOOD PRESSURE: 147 MMHG | HEART RATE: 86 BPM | RESPIRATION RATE: 20 BRPM

## 2021-05-05 DIAGNOSIS — L97.512 NON-PRESSURE CHRONIC ULCER OF OTHER PART OF RIGHT FOOT WITH FAT LAYER EXPOSED: Primary | ICD-10-CM

## 2021-05-05 PROCEDURE — 99213 OFFICE O/P EST LOW 20 MIN: CPT

## 2021-05-05 PROCEDURE — 27201914 HC 4-LAYER WRAP

## 2021-05-05 PROCEDURE — 99213 OFFICE O/P EST LOW 20 MIN: CPT | Mod: ,,, | Performed by: FAMILY MEDICINE

## 2021-05-05 PROCEDURE — 99213 PR OFFICE/OUTPT VISIT, EST, LEVL III, 20-29 MIN: ICD-10-PCS | Mod: ,,, | Performed by: FAMILY MEDICINE

## 2021-05-19 ENCOUNTER — OFFICE VISIT (OUTPATIENT)
Dept: WOUND CARE | Facility: CLINIC | Age: 66
End: 2021-05-19
Attending: NURSE PRACTITIONER
Payer: COMMERCIAL

## 2021-05-19 VITALS — SYSTOLIC BLOOD PRESSURE: 134 MMHG | HEART RATE: 76 BPM | DIASTOLIC BLOOD PRESSURE: 80 MMHG

## 2021-05-19 DIAGNOSIS — I73.9 PERIPHERAL VASCULAR DISEASE, UNSPECIFIED: ICD-10-CM

## 2021-05-19 DIAGNOSIS — E08.621 DIABETIC ULCER OF MIDFOOT ASSOCIATED WITH DIABETES MELLITUS DUE TO UNDERLYING CONDITION, WITH NECROSIS OF BONE, UNSPECIFIED LATERALITY: ICD-10-CM

## 2021-05-19 DIAGNOSIS — L97.512 NON-PRESSURE CHRONIC ULCER OF OTHER PART OF RIGHT FOOT WITH FAT LAYER EXPOSED: Primary | ICD-10-CM

## 2021-05-19 DIAGNOSIS — L97.404 DIABETIC ULCER OF MIDFOOT ASSOCIATED WITH DIABETES MELLITUS DUE TO UNDERLYING CONDITION, WITH NECROSIS OF BONE, UNSPECIFIED LATERALITY: ICD-10-CM

## 2021-05-19 DIAGNOSIS — D64.9 ANEMIA, UNSPECIFIED TYPE: ICD-10-CM

## 2021-05-19 PROCEDURE — 99499 NO LOS: ICD-10-PCS | Mod: S$PBB,,, | Performed by: NURSE PRACTITIONER

## 2021-05-19 PROCEDURE — 87076 CULTURE ANAEROBE IDENT EACH: CPT | Mod: ,,, | Performed by: CLINICAL MEDICAL LABORATORY

## 2021-05-19 PROCEDURE — 87076 CULTURE, ANAEROBE: ICD-10-PCS | Mod: ,,, | Performed by: CLINICAL MEDICAL LABORATORY

## 2021-05-19 PROCEDURE — 87070 CULTURE OTHR SPECIMN AEROBIC: CPT | Mod: ,,, | Performed by: CLINICAL MEDICAL LABORATORY

## 2021-05-19 PROCEDURE — 11042 DBRDMT SUBQ TIS 1ST 20SQCM/<: CPT | Mod: S$PBB,,, | Performed by: NURSE PRACTITIONER

## 2021-05-19 PROCEDURE — 87070 CULTURE, WOUND: ICD-10-PCS | Mod: ,,, | Performed by: CLINICAL MEDICAL LABORATORY

## 2021-05-19 PROCEDURE — 87075 CULTR BACTERIA EXCEPT BLOOD: CPT | Mod: ,,, | Performed by: CLINICAL MEDICAL LABORATORY

## 2021-05-19 PROCEDURE — 87186 SC STD MICRODIL/AGAR DIL: CPT | Mod: ,,, | Performed by: CLINICAL MEDICAL LABORATORY

## 2021-05-19 PROCEDURE — 99499 UNLISTED E&M SERVICE: CPT | Mod: S$PBB,,, | Performed by: NURSE PRACTITIONER

## 2021-05-19 PROCEDURE — 87075 CULTURE, ANAEROBE: ICD-10-PCS | Mod: ,,, | Performed by: CLINICAL MEDICAL LABORATORY

## 2021-05-19 PROCEDURE — 11042 DBRDMT SUBQ TIS 1ST 20SQCM/<: CPT | Mod: PBBFAC | Performed by: NURSE PRACTITIONER

## 2021-05-19 PROCEDURE — 87077 CULTURE AEROBIC IDENTIFY: CPT | Mod: ,,, | Performed by: CLINICAL MEDICAL LABORATORY

## 2021-05-19 PROCEDURE — 11042 PR DEBRIDEMENT, SKIN, SUB-Q TISSUE,=<20 SQ CM: ICD-10-PCS | Mod: S$PBB,,, | Performed by: NURSE PRACTITIONER

## 2021-05-19 PROCEDURE — 99213 OFFICE O/P EST LOW 20 MIN: CPT | Mod: PBBFAC,25 | Performed by: NURSE PRACTITIONER

## 2021-05-19 PROCEDURE — 87186 CULTURE, WOUND: ICD-10-PCS | Mod: ,,, | Performed by: CLINICAL MEDICAL LABORATORY

## 2021-05-19 PROCEDURE — 11042 DBRDMT SUBQ TIS 1ST 20SQCM/<: CPT

## 2021-05-19 PROCEDURE — 87077 CULTURE, WOUND: ICD-10-PCS | Mod: ,,, | Performed by: CLINICAL MEDICAL LABORATORY

## 2021-05-22 LAB
MICROORGANISM SPEC CULT: ABNORMAL
MICROORGANISM SPEC CULT: ABNORMAL

## 2021-05-25 LAB
BACTERIA SPEC ANAEROBE CULT: ABNORMAL

## 2021-06-01 ENCOUNTER — OFFICE VISIT (OUTPATIENT)
Dept: WOUND CARE | Facility: CLINIC | Age: 66
End: 2021-06-01
Attending: NURSE PRACTITIONER
Payer: COMMERCIAL

## 2021-06-01 VITALS
RESPIRATION RATE: 20 BRPM | WEIGHT: 170 LBS | TEMPERATURE: 97 F | HEART RATE: 83 BPM | DIASTOLIC BLOOD PRESSURE: 85 MMHG | HEIGHT: 70 IN | SYSTOLIC BLOOD PRESSURE: 159 MMHG | BODY MASS INDEX: 24.34 KG/M2

## 2021-06-01 DIAGNOSIS — L97.512 NON-PRESSURE CHRONIC ULCER OF OTHER PART OF RIGHT FOOT WITH FAT LAYER EXPOSED: Primary | ICD-10-CM

## 2021-06-01 DIAGNOSIS — L97.509 TYPE 2 DIABETES MELLITUS WITH FOOT ULCER, UNSPECIFIED WHETHER LONG TERM INSULIN USE: ICD-10-CM

## 2021-06-01 DIAGNOSIS — I73.9 PERIPHERAL VASCULAR DISEASE, UNSPECIFIED: ICD-10-CM

## 2021-06-01 DIAGNOSIS — E11.621 TYPE 2 DIABETES MELLITUS WITH FOOT ULCER, UNSPECIFIED WHETHER LONG TERM INSULIN USE: ICD-10-CM

## 2021-06-01 PROCEDURE — 99214 OFFICE O/P EST MOD 30 MIN: CPT | Mod: PBBFAC | Performed by: NURSE PRACTITIONER

## 2021-06-01 PROCEDURE — 3051F HG A1C>EQUAL 7.0%<8.0%: CPT | Mod: ,,, | Performed by: NURSE PRACTITIONER

## 2021-06-01 PROCEDURE — 99214 PR OFFICE/OUTPT VISIT, EST, LEVL IV, 30-39 MIN: ICD-10-PCS | Mod: S$PBB,,, | Performed by: NURSE PRACTITIONER

## 2021-06-01 PROCEDURE — 99214 OFFICE O/P EST MOD 30 MIN: CPT | Mod: S$PBB,,, | Performed by: NURSE PRACTITIONER

## 2021-06-01 PROCEDURE — 3051F PR MOST RECENT HEMOGLOBIN A1C LEVEL 7.0 - < 8.0%: ICD-10-PCS | Mod: ,,, | Performed by: NURSE PRACTITIONER

## 2021-06-01 RX ORDER — LEVOFLOXACIN 500 MG/1
1 TABLET, FILM COATED ORAL DAILY
COMMUNITY
Start: 2021-05-25 | End: 2023-10-09 | Stop reason: CLARIF

## 2021-06-02 PROBLEM — L97.509 TYPE 2 DIABETES MELLITUS WITH FOOT ULCER: Status: ACTIVE | Noted: 2021-06-02

## 2021-06-02 PROBLEM — E11.621 TYPE 2 DIABETES MELLITUS WITH FOOT ULCER: Status: ACTIVE | Noted: 2021-06-02

## 2021-06-15 ENCOUNTER — OFFICE VISIT (OUTPATIENT)
Dept: WOUND CARE | Facility: CLINIC | Age: 66
End: 2021-06-15
Attending: NURSE PRACTITIONER
Payer: COMMERCIAL

## 2021-06-15 VITALS
DIASTOLIC BLOOD PRESSURE: 80 MMHG | SYSTOLIC BLOOD PRESSURE: 125 MMHG | WEIGHT: 170 LBS | RESPIRATION RATE: 20 BRPM | HEART RATE: 74 BPM | TEMPERATURE: 97 F | BODY MASS INDEX: 24.34 KG/M2 | HEIGHT: 70 IN

## 2021-06-15 DIAGNOSIS — L97.512 NON-PRESSURE CHRONIC ULCER OF OTHER PART OF RIGHT FOOT WITH FAT LAYER EXPOSED: ICD-10-CM

## 2021-06-15 DIAGNOSIS — L97.509 TYPE 2 DIABETES MELLITUS WITH FOOT ULCER, UNSPECIFIED WHETHER LONG TERM INSULIN USE: ICD-10-CM

## 2021-06-15 DIAGNOSIS — I73.9 PERIPHERAL VASCULAR DISEASE, UNSPECIFIED: Primary | ICD-10-CM

## 2021-06-15 DIAGNOSIS — E11.621 TYPE 2 DIABETES MELLITUS WITH FOOT ULCER, UNSPECIFIED WHETHER LONG TERM INSULIN USE: ICD-10-CM

## 2021-06-15 PROCEDURE — 99214 OFFICE O/P EST MOD 30 MIN: CPT | Mod: PBBFAC | Performed by: NURSE PRACTITIONER

## 2021-06-15 PROCEDURE — 99214 OFFICE O/P EST MOD 30 MIN: CPT | Mod: S$PBB,,, | Performed by: NURSE PRACTITIONER

## 2021-06-15 PROCEDURE — 99214 PR OFFICE/OUTPT VISIT, EST, LEVL IV, 30-39 MIN: ICD-10-PCS | Mod: S$PBB,,, | Performed by: NURSE PRACTITIONER

## 2021-06-15 PROCEDURE — 3051F HG A1C>EQUAL 7.0%<8.0%: CPT | Mod: ,,, | Performed by: NURSE PRACTITIONER

## 2021-06-15 PROCEDURE — 3051F PR MOST RECENT HEMOGLOBIN A1C LEVEL 7.0 - < 8.0%: ICD-10-PCS | Mod: ,,, | Performed by: NURSE PRACTITIONER

## 2021-07-02 ENCOUNTER — OFFICE VISIT (OUTPATIENT)
Dept: WOUND CARE | Facility: CLINIC | Age: 66
End: 2021-07-02
Attending: NURSE PRACTITIONER
Payer: COMMERCIAL

## 2021-07-02 VITALS
WEIGHT: 170 LBS | TEMPERATURE: 97 F | DIASTOLIC BLOOD PRESSURE: 81 MMHG | RESPIRATION RATE: 20 BRPM | SYSTOLIC BLOOD PRESSURE: 150 MMHG | HEART RATE: 88 BPM | HEIGHT: 70 IN | BODY MASS INDEX: 24.34 KG/M2

## 2021-07-02 DIAGNOSIS — L97.512 NON-PRESSURE CHRONIC ULCER OF OTHER PART OF RIGHT FOOT WITH FAT LAYER EXPOSED: ICD-10-CM

## 2021-07-02 DIAGNOSIS — I73.9 PERIPHERAL VASCULAR DISEASE, UNSPECIFIED: Primary | ICD-10-CM

## 2021-07-02 PROCEDURE — 99214 OFFICE O/P EST MOD 30 MIN: CPT | Mod: PBBFAC | Performed by: SURGERY

## 2021-07-02 PROCEDURE — 99214 OFFICE O/P EST MOD 30 MIN: CPT | Mod: S$PBB,,, | Performed by: SURGERY

## 2021-07-02 PROCEDURE — 99214 PR OFFICE/OUTPT VISIT, EST, LEVL IV, 30-39 MIN: ICD-10-PCS | Mod: S$PBB,,, | Performed by: SURGERY

## 2021-07-21 ENCOUNTER — CLINICAL SUPPORT (OUTPATIENT)
Dept: WOUND CARE | Facility: CLINIC | Age: 66
End: 2021-07-21
Payer: COMMERCIAL

## 2021-07-21 VITALS
HEIGHT: 70 IN | RESPIRATION RATE: 20 BRPM | SYSTOLIC BLOOD PRESSURE: 142 MMHG | TEMPERATURE: 97 F | WEIGHT: 170 LBS | HEART RATE: 85 BPM | BODY MASS INDEX: 24.34 KG/M2 | DIASTOLIC BLOOD PRESSURE: 85 MMHG

## 2021-07-21 DIAGNOSIS — E08.621 DIABETIC ULCER OF MIDFOOT ASSOCIATED WITH DIABETES MELLITUS DUE TO UNDERLYING CONDITION, WITH NECROSIS OF BONE, UNSPECIFIED LATERALITY: ICD-10-CM

## 2021-07-21 DIAGNOSIS — L97.404 DIABETIC ULCER OF MIDFOOT ASSOCIATED WITH DIABETES MELLITUS DUE TO UNDERLYING CONDITION, WITH NECROSIS OF BONE, UNSPECIFIED LATERALITY: ICD-10-CM

## 2021-07-21 DIAGNOSIS — D64.9 ANEMIA, UNSPECIFIED TYPE: ICD-10-CM

## 2021-07-21 DIAGNOSIS — L97.509 TYPE 2 DIABETES MELLITUS WITH FOOT ULCER, UNSPECIFIED WHETHER LONG TERM INSULIN USE: ICD-10-CM

## 2021-07-21 DIAGNOSIS — I73.9 PERIPHERAL VASCULAR DISEASE, UNSPECIFIED: Primary | ICD-10-CM

## 2021-07-21 DIAGNOSIS — E08.621 DIABETIC ULCER OF RIGHT MIDFOOT ASSOCIATED WITH DIABETES MELLITUS DUE TO UNDERLYING CONDITION, WITH NECROSIS OF BONE: ICD-10-CM

## 2021-07-21 DIAGNOSIS — L97.512 NON-PRESSURE CHRONIC ULCER OF OTHER PART OF RIGHT FOOT WITH FAT LAYER EXPOSED: ICD-10-CM

## 2021-07-21 DIAGNOSIS — L97.414 DIABETIC ULCER OF RIGHT MIDFOOT ASSOCIATED WITH DIABETES MELLITUS DUE TO UNDERLYING CONDITION, WITH NECROSIS OF BONE: ICD-10-CM

## 2021-07-21 DIAGNOSIS — E11.621 TYPE 2 DIABETES MELLITUS WITH FOOT ULCER, UNSPECIFIED WHETHER LONG TERM INSULIN USE: ICD-10-CM

## 2021-07-21 PROCEDURE — 99213 OFFICE O/P EST LOW 20 MIN: CPT | Mod: PBBFAC

## 2021-08-11 ENCOUNTER — CLINICAL SUPPORT (OUTPATIENT)
Dept: WOUND CARE | Facility: CLINIC | Age: 66
End: 2021-08-11
Payer: COMMERCIAL

## 2021-08-11 VITALS
HEIGHT: 70 IN | SYSTOLIC BLOOD PRESSURE: 161 MMHG | BODY MASS INDEX: 24.34 KG/M2 | HEART RATE: 80 BPM | RESPIRATION RATE: 20 BRPM | DIASTOLIC BLOOD PRESSURE: 71 MMHG | WEIGHT: 170 LBS

## 2021-08-11 DIAGNOSIS — L97.512 NON-PRESSURE CHRONIC ULCER OF OTHER PART OF RIGHT FOOT WITH FAT LAYER EXPOSED: ICD-10-CM

## 2021-08-11 DIAGNOSIS — L97.509 TYPE 2 DIABETES MELLITUS WITH FOOT ULCER, UNSPECIFIED WHETHER LONG TERM INSULIN USE: ICD-10-CM

## 2021-08-11 DIAGNOSIS — E08.621 DIABETIC ULCER OF RIGHT MIDFOOT ASSOCIATED WITH DIABETES MELLITUS DUE TO UNDERLYING CONDITION, WITH NECROSIS OF BONE: ICD-10-CM

## 2021-08-11 DIAGNOSIS — L97.414 DIABETIC ULCER OF RIGHT MIDFOOT ASSOCIATED WITH DIABETES MELLITUS DUE TO UNDERLYING CONDITION, WITH NECROSIS OF BONE: ICD-10-CM

## 2021-08-11 DIAGNOSIS — I73.9 PERIPHERAL VASCULAR DISEASE, UNSPECIFIED: Primary | ICD-10-CM

## 2021-08-11 DIAGNOSIS — E08.621 DIABETIC ULCER OF MIDFOOT ASSOCIATED WITH DIABETES MELLITUS DUE TO UNDERLYING CONDITION, WITH NECROSIS OF BONE, UNSPECIFIED LATERALITY: ICD-10-CM

## 2021-08-11 DIAGNOSIS — L97.404 DIABETIC ULCER OF MIDFOOT ASSOCIATED WITH DIABETES MELLITUS DUE TO UNDERLYING CONDITION, WITH NECROSIS OF BONE, UNSPECIFIED LATERALITY: ICD-10-CM

## 2021-08-11 DIAGNOSIS — E11.621 TYPE 2 DIABETES MELLITUS WITH FOOT ULCER, UNSPECIFIED WHETHER LONG TERM INSULIN USE: ICD-10-CM

## 2021-08-11 DIAGNOSIS — D64.9 ANEMIA, UNSPECIFIED TYPE: ICD-10-CM

## 2021-08-11 PROCEDURE — 99214 OFFICE O/P EST MOD 30 MIN: CPT | Mod: S$PBB,,, | Performed by: FAMILY MEDICINE

## 2021-08-11 PROCEDURE — 99213 OFFICE O/P EST LOW 20 MIN: CPT | Mod: PBBFAC | Performed by: FAMILY MEDICINE

## 2021-08-11 PROCEDURE — 99214 PR OFFICE/OUTPT VISIT, EST, LEVL IV, 30-39 MIN: ICD-10-PCS | Mod: S$PBB,,, | Performed by: FAMILY MEDICINE

## 2021-09-01 ENCOUNTER — OFFICE VISIT (OUTPATIENT)
Dept: WOUND CARE | Facility: CLINIC | Age: 66
End: 2021-09-01
Attending: FAMILY MEDICINE
Payer: COMMERCIAL

## 2021-09-01 DIAGNOSIS — D64.9 ANEMIA, UNSPECIFIED TYPE: ICD-10-CM

## 2021-09-01 DIAGNOSIS — L97.414 DIABETIC ULCER OF RIGHT MIDFOOT ASSOCIATED WITH DIABETES MELLITUS DUE TO UNDERLYING CONDITION, WITH NECROSIS OF BONE: ICD-10-CM

## 2021-09-01 DIAGNOSIS — I73.9 PERIPHERAL VASCULAR DISEASE, UNSPECIFIED: Primary | ICD-10-CM

## 2021-09-01 DIAGNOSIS — E11.621 TYPE 2 DIABETES MELLITUS WITH FOOT ULCER, UNSPECIFIED WHETHER LONG TERM INSULIN USE: ICD-10-CM

## 2021-09-01 DIAGNOSIS — L97.512 NON-PRESSURE CHRONIC ULCER OF OTHER PART OF RIGHT FOOT WITH FAT LAYER EXPOSED: ICD-10-CM

## 2021-09-01 DIAGNOSIS — E08.621 DIABETIC ULCER OF RIGHT MIDFOOT ASSOCIATED WITH DIABETES MELLITUS DUE TO UNDERLYING CONDITION, WITH NECROSIS OF BONE: ICD-10-CM

## 2021-09-01 DIAGNOSIS — L97.404 DIABETIC ULCER OF MIDFOOT ASSOCIATED WITH DIABETES MELLITUS DUE TO UNDERLYING CONDITION, WITH NECROSIS OF BONE, UNSPECIFIED LATERALITY: ICD-10-CM

## 2021-09-01 DIAGNOSIS — L97.509 TYPE 2 DIABETES MELLITUS WITH FOOT ULCER, UNSPECIFIED WHETHER LONG TERM INSULIN USE: ICD-10-CM

## 2021-09-01 DIAGNOSIS — E08.621 DIABETIC ULCER OF MIDFOOT ASSOCIATED WITH DIABETES MELLITUS DUE TO UNDERLYING CONDITION, WITH NECROSIS OF BONE, UNSPECIFIED LATERALITY: ICD-10-CM

## 2021-09-01 PROCEDURE — 99213 OFFICE O/P EST LOW 20 MIN: CPT | Mod: PBBFAC | Performed by: FAMILY MEDICINE

## 2021-09-01 PROCEDURE — 99214 PR OFFICE/OUTPT VISIT, EST, LEVL IV, 30-39 MIN: ICD-10-PCS | Mod: S$PBB,,, | Performed by: FAMILY MEDICINE

## 2021-09-01 PROCEDURE — 99214 OFFICE O/P EST MOD 30 MIN: CPT | Mod: S$PBB,,, | Performed by: FAMILY MEDICINE

## 2021-10-01 ENCOUNTER — OFFICE VISIT (OUTPATIENT)
Dept: WOUND CARE | Facility: CLINIC | Age: 66
End: 2021-10-01
Attending: FAMILY MEDICINE
Payer: COMMERCIAL

## 2021-10-01 VITALS
HEART RATE: 74 BPM | DIASTOLIC BLOOD PRESSURE: 75 MMHG | RESPIRATION RATE: 18 BRPM | TEMPERATURE: 97 F | SYSTOLIC BLOOD PRESSURE: 121 MMHG

## 2021-10-01 DIAGNOSIS — L97.512 NON-PRESSURE CHRONIC ULCER OF OTHER PART OF RIGHT FOOT WITH FAT LAYER EXPOSED: ICD-10-CM

## 2021-10-01 DIAGNOSIS — D64.9 ANEMIA, UNSPECIFIED TYPE: ICD-10-CM

## 2021-10-01 DIAGNOSIS — I73.9 PERIPHERAL VASCULAR DISEASE, UNSPECIFIED: Primary | ICD-10-CM

## 2021-10-01 DIAGNOSIS — L97.509 TYPE 2 DIABETES MELLITUS WITH FOOT ULCER, UNSPECIFIED WHETHER LONG TERM INSULIN USE: ICD-10-CM

## 2021-10-01 DIAGNOSIS — L97.414 DIABETIC ULCER OF RIGHT MIDFOOT ASSOCIATED WITH DIABETES MELLITUS DUE TO UNDERLYING CONDITION, WITH NECROSIS OF BONE: ICD-10-CM

## 2021-10-01 DIAGNOSIS — L97.404 DIABETIC ULCER OF MIDFOOT ASSOCIATED WITH DIABETES MELLITUS DUE TO UNDERLYING CONDITION, WITH NECROSIS OF BONE, UNSPECIFIED LATERALITY: ICD-10-CM

## 2021-10-01 DIAGNOSIS — E08.621 DIABETIC ULCER OF RIGHT MIDFOOT ASSOCIATED WITH DIABETES MELLITUS DUE TO UNDERLYING CONDITION, WITH NECROSIS OF BONE: ICD-10-CM

## 2021-10-01 DIAGNOSIS — E08.621 DIABETIC ULCER OF MIDFOOT ASSOCIATED WITH DIABETES MELLITUS DUE TO UNDERLYING CONDITION, WITH NECROSIS OF BONE, UNSPECIFIED LATERALITY: ICD-10-CM

## 2021-10-01 DIAGNOSIS — E11.621 TYPE 2 DIABETES MELLITUS WITH FOOT ULCER, UNSPECIFIED WHETHER LONG TERM INSULIN USE: ICD-10-CM

## 2021-10-01 PROCEDURE — 99214 PR OFFICE/OUTPT VISIT, EST, LEVL IV, 30-39 MIN: ICD-10-PCS | Mod: S$PBB,,, | Performed by: SURGERY

## 2021-10-01 PROCEDURE — 99214 OFFICE O/P EST MOD 30 MIN: CPT | Mod: S$PBB,,, | Performed by: SURGERY

## 2021-10-01 PROCEDURE — 99212 OFFICE O/P EST SF 10 MIN: CPT | Mod: PBBFAC | Performed by: SURGERY

## 2023-10-09 ENCOUNTER — HOSPITAL ENCOUNTER (EMERGENCY)
Facility: HOSPITAL | Age: 68
Discharge: ANOTHER HOSPITAL | End: 2023-10-09
Attending: FAMILY MEDICINE
Payer: COMMERCIAL

## 2023-10-09 VITALS
TEMPERATURE: 98 F | DIASTOLIC BLOOD PRESSURE: 106 MMHG | WEIGHT: 140 LBS | BODY MASS INDEX: 20.04 KG/M2 | OXYGEN SATURATION: 100 % | RESPIRATION RATE: 19 BRPM | HEIGHT: 70 IN | HEART RATE: 88 BPM | SYSTOLIC BLOOD PRESSURE: 146 MMHG

## 2023-10-09 DIAGNOSIS — S91.309A OPEN WOUND OF DORSUM OF FOOT: Primary | ICD-10-CM

## 2023-10-09 DIAGNOSIS — S91.104A OPEN WOUND OF LESSER TOE OF RIGHT FOOT WITHOUT DAMAGE TO NAIL, INITIAL ENCOUNTER: ICD-10-CM

## 2023-10-09 DIAGNOSIS — E87.1 HYPONATREMIA: ICD-10-CM

## 2023-10-09 DIAGNOSIS — D72.829 LEUKOCYTOSIS, UNSPECIFIED TYPE: ICD-10-CM

## 2023-10-09 DIAGNOSIS — R07.9 CHEST PAIN: ICD-10-CM

## 2023-10-09 DIAGNOSIS — M86.9 OSTEOMYELITIS OF RIGHT FOOT, UNSPECIFIED TYPE: ICD-10-CM

## 2023-10-09 DIAGNOSIS — E08.620: ICD-10-CM

## 2023-10-09 DIAGNOSIS — L03.115 CELLULITIS OF RIGHT LOWER EXTREMITY: ICD-10-CM

## 2023-10-09 LAB
ALBUMIN SERPL BCP-MCNC: 3.1 G/DL (ref 3.5–5)
ALBUMIN/GLOB SERPL: 0.6 {RATIO}
ALP SERPL-CCNC: 157 U/L (ref 45–115)
ALT SERPL W P-5'-P-CCNC: 22 U/L (ref 16–61)
ANION GAP SERPL CALCULATED.3IONS-SCNC: 14 MMOL/L (ref 7–16)
AST SERPL W P-5'-P-CCNC: 24 U/L (ref 15–37)
BASOPHILS # BLD AUTO: 0.07 K/UL (ref 0–0.2)
BASOPHILS NFR BLD AUTO: 0.3 % (ref 0–1)
BILIRUB SERPL-MCNC: 0.6 MG/DL (ref ?–1.2)
BUN SERPL-MCNC: 23 MG/DL (ref 7–18)
BUN/CREAT SERPL: 18 (ref 6–20)
CALCIUM SERPL-MCNC: 9.2 MG/DL (ref 8.5–10.1)
CHLORIDE SERPL-SCNC: 91 MMOL/L (ref 98–107)
CO2 SERPL-SCNC: 29 MMOL/L (ref 21–32)
CREAT SERPL-MCNC: 1.26 MG/DL (ref 0.7–1.3)
DIFFERENTIAL METHOD BLD: ABNORMAL
EGFR (NO RACE VARIABLE) (RUSH/TITUS): 63 ML/MIN/1.73M2
EOSINOPHIL # BLD AUTO: 0.02 K/UL (ref 0–0.5)
EOSINOPHIL NFR BLD AUTO: 0.1 % (ref 1–4)
ERYTHROCYTE [DISTWIDTH] IN BLOOD BY AUTOMATED COUNT: 13.3 % (ref 11.5–14.5)
EST. AVERAGE GLUCOSE BLD GHB EST-MCNC: 140 MG/DL
GLOBULIN SER-MCNC: 5.4 G/DL (ref 2–4)
GLUCOSE SERPL-MCNC: 182 MG/DL (ref 70–105)
GLUCOSE SERPL-MCNC: 186 MG/DL (ref 74–106)
HBA1C MFR BLD HPLC: 6.8 % (ref 4.5–6.6)
HCT VFR BLD AUTO: 41 % (ref 40–54)
HGB BLD-MCNC: 14.1 G/DL (ref 13.5–18)
IMM GRANULOCYTES # BLD AUTO: 0.17 K/UL (ref 0–0.04)
IMM GRANULOCYTES NFR BLD: 0.8 % (ref 0–0.4)
LACTATE SERPL-SCNC: 1.4 MMOL/L (ref 0.4–2)
LYMPHOCYTES # BLD AUTO: 1.38 K/UL (ref 1–4.8)
LYMPHOCYTES NFR BLD AUTO: 6.7 % (ref 27–41)
MCH RBC QN AUTO: 29.2 PG (ref 27–31)
MCHC RBC AUTO-ENTMCNC: 34.4 G/DL (ref 32–36)
MCV RBC AUTO: 84.9 FL (ref 80–96)
MONOCYTES # BLD AUTO: 2.05 K/UL (ref 0–0.8)
MONOCYTES NFR BLD AUTO: 10 % (ref 2–6)
MPC BLD CALC-MCNC: 9.1 FL (ref 9.4–12.4)
NEUTROPHILS # BLD AUTO: 16.81 K/UL (ref 1.8–7.7)
NEUTROPHILS NFR BLD AUTO: 82.1 % (ref 53–65)
NRBC # BLD AUTO: 0 X10E3/UL
NRBC, AUTO (.00): 0 %
NT-PROBNP SERPL-MCNC: 491 PG/ML (ref 1–125)
PLATELET # BLD AUTO: 365 K/UL (ref 150–400)
POTASSIUM SERPL-SCNC: 3.6 MMOL/L (ref 3.5–5.1)
PROT SERPL-MCNC: 8.5 G/DL (ref 6.4–8.2)
RBC # BLD AUTO: 4.83 M/UL (ref 4.6–6.2)
SODIUM SERPL-SCNC: 130 MMOL/L (ref 136–145)
TROPONIN I SERPL DL<=0.01 NG/ML-MCNC: 7.1 PG/ML
WBC # BLD AUTO: 20.5 K/UL (ref 4.5–11)

## 2023-10-09 PROCEDURE — 94761 N-INVAS EAR/PLS OXIMETRY MLT: CPT

## 2023-10-09 PROCEDURE — 27000221 HC OXYGEN, UP TO 24 HOURS

## 2023-10-09 PROCEDURE — 93010 ELECTROCARDIOGRAM REPORT: CPT | Mod: ,,, | Performed by: INTERNAL MEDICINE

## 2023-10-09 PROCEDURE — 99285 EMERGENCY DEPT VISIT HI MDM: CPT | Mod: 25

## 2023-10-09 PROCEDURE — 93010 EKG 12-LEAD: ICD-10-PCS | Mod: ,,, | Performed by: INTERNAL MEDICINE

## 2023-10-09 PROCEDURE — 84484 ASSAY OF TROPONIN QUANT: CPT | Performed by: FAMILY MEDICINE

## 2023-10-09 PROCEDURE — 80053 COMPREHEN METABOLIC PANEL: CPT | Performed by: FAMILY MEDICINE

## 2023-10-09 PROCEDURE — 83605 ASSAY OF LACTIC ACID: CPT | Performed by: FAMILY MEDICINE

## 2023-10-09 PROCEDURE — 83036 HEMOGLOBIN GLYCOSYLATED A1C: CPT | Performed by: FAMILY MEDICINE

## 2023-10-09 PROCEDURE — 87040 BLOOD CULTURE FOR BACTERIA: CPT | Performed by: FAMILY MEDICINE

## 2023-10-09 PROCEDURE — 96365 THER/PROPH/DIAG IV INF INIT: CPT

## 2023-10-09 PROCEDURE — 83880 ASSAY OF NATRIURETIC PEPTIDE: CPT | Performed by: FAMILY MEDICINE

## 2023-10-09 PROCEDURE — 63600175 PHARM REV CODE 636 W HCPCS: Performed by: FAMILY MEDICINE

## 2023-10-09 PROCEDURE — 96376 TX/PRO/DX INJ SAME DRUG ADON: CPT

## 2023-10-09 PROCEDURE — 25000003 PHARM REV CODE 250: Performed by: FAMILY MEDICINE

## 2023-10-09 PROCEDURE — 85025 COMPLETE CBC W/AUTO DIFF WBC: CPT | Performed by: FAMILY MEDICINE

## 2023-10-09 PROCEDURE — 25000242 PHARM REV CODE 250 ALT 637 W/ HCPCS: Performed by: FAMILY MEDICINE

## 2023-10-09 PROCEDURE — 99291 PR CRITICAL CARE, E/M 30-74 MINUTES: ICD-10-PCS | Mod: ,,, | Performed by: FAMILY MEDICINE

## 2023-10-09 PROCEDURE — 99291 CRITICAL CARE FIRST HOUR: CPT | Mod: ,,, | Performed by: FAMILY MEDICINE

## 2023-10-09 PROCEDURE — 82962 GLUCOSE BLOOD TEST: CPT

## 2023-10-09 PROCEDURE — 96375 TX/PRO/DX INJ NEW DRUG ADDON: CPT

## 2023-10-09 PROCEDURE — 87070 CULTURE OTHR SPECIMN AEROBIC: CPT | Performed by: FAMILY MEDICINE

## 2023-10-09 PROCEDURE — 93005 ELECTROCARDIOGRAM TRACING: CPT

## 2023-10-09 RX ORDER — ASPIRIN 81 MG/1
325 TABLET ORAL ONCE
Status: DISCONTINUED | OUTPATIENT
Start: 2023-10-09 | End: 2023-10-09

## 2023-10-09 RX ORDER — MORPHINE SULFATE 2 MG/ML
2 INJECTION, SOLUTION INTRAMUSCULAR; INTRAVENOUS
Status: COMPLETED | OUTPATIENT
Start: 2023-10-09 | End: 2023-10-09

## 2023-10-09 RX ORDER — MORPHINE SULFATE 4 MG/ML
4 INJECTION, SOLUTION INTRAMUSCULAR; INTRAVENOUS
Status: COMPLETED | OUTPATIENT
Start: 2023-10-09 | End: 2023-10-09

## 2023-10-09 RX ORDER — NITROGLYCERIN 0.4 MG/1
0.4 TABLET SUBLINGUAL
Status: COMPLETED | OUTPATIENT
Start: 2023-10-09 | End: 2023-10-09

## 2023-10-09 RX ORDER — ASPIRIN 325 MG
325 TABLET ORAL
Status: COMPLETED | OUTPATIENT
Start: 2023-10-09 | End: 2023-10-09

## 2023-10-09 RX ORDER — FLASH GLUCOSE SENSOR
KIT MISCELLANEOUS
COMMUNITY
Start: 2023-10-03

## 2023-10-09 RX ORDER — INSULIN DEGLUDEC 100 U/ML
35 INJECTION, SOLUTION SUBCUTANEOUS DAILY
COMMUNITY
Start: 2023-07-26

## 2023-10-09 RX ORDER — PEN NEEDLE, DIABETIC 32GX 5/32"
NEEDLE, DISPOSABLE MISCELLANEOUS
COMMUNITY
Start: 2023-04-27

## 2023-10-09 RX ADMIN — MORPHINE SULFATE 2 MG: 2 INJECTION, SOLUTION INTRAMUSCULAR; INTRAVENOUS at 07:10

## 2023-10-09 RX ADMIN — DEXTROSE MONOHYDRATE 1 G: 5 INJECTION INTRAVENOUS at 09:10

## 2023-10-09 RX ADMIN — NITROGLYCERIN 0.4 MG: 0.4 TABLET SUBLINGUAL at 07:10

## 2023-10-09 RX ADMIN — ASPIRIN 325 MG ORAL TABLET 325 MG: 325 PILL ORAL at 07:10

## 2023-10-09 RX ADMIN — MORPHINE SULFATE 4 MG: 4 INJECTION, SOLUTION INTRAMUSCULAR; INTRAVENOUS at 11:10

## 2023-10-09 NOTE — ED PROVIDER NOTES
VEncounter Date: 10/9/2023       History     Chief Complaint   Patient presents with    Chest Pain     68 year old WM diabetic presents with a right foot plantar lateral 5 cm wound and chest pain.  The chest pain is 10/10, left sided, substernal x 1 hour.  The foot wound has been present for over 2 years.  The pain is partially relieved with rest, worse with exertion.  He has had several debridements and toe amputations of the right foot. He has no fever but he does have right foot pain 4/10 and decreased sensation. The foot xray shows several Charcot changes.    The history is provided by the patient.     Review of patient's allergies indicates:   Allergen Reactions    Nubain [nalbuphine]      Past Medical History:   Diagnosis Date    Anemia     Arthritis     Diabetes mellitus, type 2     Dry skin     Hypertension     Immune deficiency disorder     Muscle pain     Neuropathy     Peripheral vascular disease      Past Surgical History:   Procedure Laterality Date    APPENDECTOMY      L index finger repair from a cat bite during childhood      Surgical debridement of R foot 8/2020       Family History   Problem Relation Age of Onset    Diabetes Mother     Hypertension Father      Social History     Tobacco Use    Smoking status: Never    Smokeless tobacco: Never   Substance Use Topics    Alcohol use: Not Currently    Drug use: Never     Review of Systems   Constitutional:  Positive for activity change, appetite change and fatigue.   HENT:  Negative for congestion.    Eyes:  Negative for discharge.   Respiratory:  Positive for chest tightness and shortness of breath.    Cardiovascular:  Positive for chest pain, palpitations and leg swelling.   Gastrointestinal:  Negative for abdominal pain.   Endocrine: Negative for cold intolerance.   Genitourinary:  Negative for difficulty urinating.   Musculoskeletal:  Positive for arthralgias, gait problem, joint swelling and myalgias.   Skin:  Positive for rash and wound.    Allergic/Immunologic: Negative for environmental allergies.   Neurological:  Negative for dizziness.   Hematological:  Negative for adenopathy.   Psychiatric/Behavioral:  Negative for behavioral problems.    All other systems reviewed and are negative.      Physical Exam     Initial Vitals [10/09/23 0632]   BP Pulse Resp Temp SpO2   (!) 161/95 98 18 98.2 °F (36.8 °C) 99 %      MAP       --         Physical Exam    Constitutional: He appears well-developed and well-nourished.   HENT:   Head: Normocephalic and atraumatic.   Eyes: EOM are normal. Pupils are equal, round, and reactive to light.   Neck: Neck supple.   Normal range of motion.  Cardiovascular:  Normal rate.           Abdominal: Bowel sounds are normal.   Musculoskeletal:      Cervical back: Normal range of motion and neck supple.      Comments: Right foot is tender.  Lateral right foot wound 7 cm, red weeping, tender. Decreased plantar sensation. Decreased pedal pulses 1+     Neurological: He is alert and oriented to person, place, and time. He has normal strength. No cranial nerve deficit.   Skin: Skin is warm. Rash noted. There is erythema.         Medical Screening Exam   See Full Note    ED Course   Procedures  Labs Reviewed   COMPREHENSIVE METABOLIC PANEL - Abnormal; Notable for the following components:       Result Value    Sodium 130 (*)     Chloride 91 (*)     Glucose 186 (*)     BUN 23 (*)     Total Protein 8.5 (*)     Albumin 3.1 (*)     Globulin 5.4 (*)     Alk Phos 157 (*)     All other components within normal limits   NT-PRO NATRIURETIC PEPTIDE - Abnormal; Notable for the following components:    ProBNP 491 (*)     All other components within normal limits   CBC WITH DIFFERENTIAL - Abnormal; Notable for the following components:    WBC 20.50 (*)     MPV 9.1 (*)     Neutrophils % 82.1 (*)     Lymphocytes % 6.7 (*)     Monocytes % 10.0 (*)     Eosinophils % 0.1 (*)     Immature Granulocytes % 0.8 (*)     Neutrophils, Abs 16.81 (*)      Monocytes, Absolute 2.05 (*)     Immature Granulocytes, Absolute 0.17 (*)     All other components within normal limits   POCT GLUCOSE MONITORING CONTINUOUS - Abnormal; Notable for the following components:    POC Glucose 182 (*)     All other components within normal limits   TROPONIN I - Normal   CULTURE, BLOOD   CULTURE, BLOOD   CULTURE, WOUND   CBC W/ AUTO DIFFERENTIAL    Narrative:     The following orders were created for panel order CBC auto differential.  Procedure                               Abnormality         Status                     ---------                               -----------         ------                     CBC with Differential[[id number]]       Abnormal            Final result                 Please view results for these tests on the individual orders.   HEMOGLOBIN A1C   LACTIC ACID, PLASMA     EKG Readings: (Independently Interpreted)   Initial Reading: No STEMI. Rhythm: Sinus Tachycardia. Clinical Impression: Sinus Tachycardia Other Impression: Sinus tachycardia   Vent rate 105,  QRS 88, Sinus tachycardia       Imaging Results              X-Ray Foot Complete Right (Final result)  Result time 10/09/23 08:55:50      Final result by Fredi Whittaker DO (10/09/23 08:55:50)                   Impression:      As above.    Point of Service: [facility]      Electronically signed by: Fredi Whittaker  Date:    10/09/2023  Time:    08:55               Narrative:    EXAMINATION:  XR FOOT COMPLETE 3 VIEW RIGHT    CLINICAL HISTORY:  Unspecified open wound of right lesser toe(s) without damage to nail, initial encounter    COMPARISON:  Right foot x-ray May 10, 2020    TECHNIQUE:  Frontal, lateral, and oblique views of the right foot.    FINDINGS:  Prior amputation of the 2nd and 5th digits to the mid to distal metatarsal level.  Erosive changes demonstrated about the MTP joints of the 1st, 3rd, and 4th digits as well as the mid 5th metatarsal suspicious for sequela of  osteomyelitis.  There is dislocation at the 1st, 3rd, and 4th MTP joints.  Pathologic fracturing of the 4th metatarsal.  Soft tissue swelling.  Plantar and posterior calcaneal spurring.                                       X-Ray Chest AP Portable (Final result)  Result time 10/09/23 07:59:44      Final result by Fredi Whittaker DO (10/09/23 07:59:44)                   Impression:      No acute cardiopulmonary process demonstrated.    Point of Service: [facility]      Electronically signed by: Fredi Whittaker  Date:    10/09/2023  Time:    07:59               Narrative:    EXAMINATION:  XR CHEST AP PORTABLE    CLINICAL HISTORY:  Chest pain, unspecified    COMPARISON:  None    TECHNIQUE:  Frontal view/views of the chest.    FINDINGS:  Heart size appears within normal limits.  Chronic change of the lungs without focal consolidation, pleural effusion, or pneumothorax.  Visualized osseous and surrounding soft tissue structures demonstrate no acute abnormality.                                    X-Rays:   Independently Interpreted Readings:   Other Readings:  Multiple fractures, osteomyelitis, right foot    Medications   cefTRIAXone (ROCEPHIN) 1 g in dextrose 5 % in water (D5W) 100 mL IVPB (MB+) (has no administration in time range)   nitroGLYCERIN SL tablet 0.4 mg (0.4 mg Sublingual Given 10/9/23 0702)   aspirin tablet 325 mg (325 mg Oral Given 10/9/23 0703)   morphine injection 2 mg (2 mg Intravenous Given 10/9/23 0719)     Medical Decision Making  1. Chest pain, partially relieved by morphine, O2, NTG, ASA. Normal troponin  2. Osteomyelitis right diabetic foot with leukocytosis.  Cultures obtained.  Rocephin until cultures can fine tune antibiotic. Transfer.  Diabetes mellitus controlled @ 182 by Tresiba insulin  Discussed with Dr Pool @ [facility] re transfer.    Dr York at [facility] has accepted him for transfer for chest pain and right foot diabetic osteomyelitis.    Amount and/or Complexity of Data  Reviewed  Labs: ordered.  Radiology: ordered.    Risk  OTC drugs.  Prescription drug management.  Decision regarding hospitalization.    Critical Care  Total time providing critical care: 33 minutes                               Clinical Impression:   Final diagnoses:  [R07.9] Chest pain  [S91.104A] Open wound of lesser toe of right foot without damage to nail, initial encounter  [S91.309A] Open wound of dorsum of foot (Primary)  [E08.620] Diabetes mellitus due to underlying condition with diabetic dermatitis, unspecified whether long term insulin use  [L03.115] Cellulitis of right lower extremity  [D72.829] Leukocytosis, unspecified type        ED Disposition Condition    Transfer to Another Facility Stable                Francesca Christine MD  10/09/23 0912       Francesca Christine MD  10/09/23 0920       Francesca Christine MD  10/09/23 1034       Francesca Christine MD  10/09/23 1048

## 2023-10-09 NOTE — ED NOTES
Pfc returned call-will transfer to [facility] for direct admit-dr York

## 2023-10-12 LAB
MICROORGANISM SPEC CULT: ABNORMAL
MICROORGANISM SPEC CULT: ABNORMAL

## 2023-10-15 LAB
BACTERIA BLD CULT: NORMAL
BACTERIA BLD CULT: NORMAL

## 2024-02-01 ENCOUNTER — HOSPITAL ENCOUNTER (EMERGENCY)
Facility: HOSPITAL | Age: 69
Discharge: ANOTHER HOSPITAL | End: 2024-02-01
Attending: INTERNAL MEDICINE
Payer: COMMERCIAL

## 2024-02-01 VITALS
HEART RATE: 96 BPM | RESPIRATION RATE: 18 BRPM | OXYGEN SATURATION: 98 % | DIASTOLIC BLOOD PRESSURE: 97 MMHG | SYSTOLIC BLOOD PRESSURE: 184 MMHG | TEMPERATURE: 98 F | WEIGHT: 170.88 LBS | BODY MASS INDEX: 24.52 KG/M2

## 2024-02-01 DIAGNOSIS — M86.171 ACUTE OSTEOMYELITIS OF METATARSAL BONE OF RIGHT FOOT: Primary | ICD-10-CM

## 2024-02-01 DIAGNOSIS — N39.0 URINARY TRACT INFECTION WITHOUT HEMATURIA, SITE UNSPECIFIED: ICD-10-CM

## 2024-02-01 DIAGNOSIS — R50.9 FEVER, UNSPECIFIED FEVER CAUSE: ICD-10-CM

## 2024-02-01 DIAGNOSIS — R50.9 FEVER: ICD-10-CM

## 2024-02-01 LAB
ALBUMIN SERPL BCP-MCNC: 3.2 G/DL (ref 3.5–5)
ALBUMIN/GLOB SERPL: 0.6 {RATIO}
ALP SERPL-CCNC: 135 U/L (ref 45–115)
ALT SERPL W P-5'-P-CCNC: 22 U/L (ref 16–61)
ANION GAP SERPL CALCULATED.3IONS-SCNC: 13 MMOL/L (ref 7–16)
AST SERPL W P-5'-P-CCNC: 20 U/L (ref 15–37)
BACTERIA #/AREA URNS HPF: ABNORMAL /HPF
BASOPHILS # BLD AUTO: 0.06 K/UL (ref 0–0.2)
BASOPHILS NFR BLD AUTO: 0.2 % (ref 0–1)
BILIRUB SERPL-MCNC: 0.8 MG/DL (ref ?–1.2)
BILIRUB UR QL STRIP: ABNORMAL
BUN SERPL-MCNC: 18 MG/DL (ref 7–18)
BUN/CREAT SERPL: 15 (ref 6–20)
CALCIUM SERPL-MCNC: 9.3 MG/DL (ref 8.5–10.1)
CHLORIDE SERPL-SCNC: 94 MMOL/L (ref 98–107)
CLARITY UR: ABNORMAL
CO2 SERPL-SCNC: 27 MMOL/L (ref 21–32)
COLOR UR: ABNORMAL
CREAT SERPL-MCNC: 1.19 MG/DL (ref 0.7–1.3)
DIFFERENTIAL METHOD BLD: ABNORMAL
EGFR (NO RACE VARIABLE) (RUSH/TITUS): 67 ML/MIN/1.73M2
EOSINOPHIL # BLD AUTO: 0.05 K/UL (ref 0–0.5)
EOSINOPHIL NFR BLD AUTO: 0.2 % (ref 1–4)
ERYTHROCYTE [DISTWIDTH] IN BLOOD BY AUTOMATED COUNT: 13 % (ref 11.5–14.5)
FLUAV AG UPPER RESP QL IA.RAPID: NEGATIVE
FLUBV AG UPPER RESP QL IA.RAPID: NEGATIVE
GLOBULIN SER-MCNC: 5.8 G/DL (ref 2–4)
GLUCOSE SERPL-MCNC: 160 MG/DL (ref 70–105)
GLUCOSE SERPL-MCNC: 189 MG/DL (ref 74–106)
GLUCOSE UR STRIP-MCNC: 100 MG/DL
HCT VFR BLD AUTO: 41.3 % (ref 40–54)
HGB BLD-MCNC: 14.1 G/DL (ref 13.5–18)
IMM GRANULOCYTES # BLD AUTO: 0.19 K/UL (ref 0–0.04)
IMM GRANULOCYTES NFR BLD: 0.8 % (ref 0–0.4)
KETONES UR STRIP-SCNC: 40 MG/DL
LACTATE SERPL-SCNC: 0.9 MMOL/L (ref 0.4–2)
LEUKOCYTE ESTERASE UR QL STRIP: ABNORMAL
LYMPHOCYTES # BLD AUTO: 1.18 K/UL (ref 1–4.8)
LYMPHOCYTES NFR BLD AUTO: 4.8 % (ref 27–41)
LYMPHOCYTES NFR BLD MANUAL: 11 % (ref 27–41)
MCH RBC QN AUTO: 29.5 PG (ref 27–31)
MCHC RBC AUTO-ENTMCNC: 34.1 G/DL (ref 32–36)
MCV RBC AUTO: 86.4 FL (ref 80–96)
MONOCYTES # BLD AUTO: 1.58 K/UL (ref 0–0.8)
MONOCYTES NFR BLD AUTO: 6.5 % (ref 2–6)
MONOCYTES NFR BLD MANUAL: 4 % (ref 2–6)
MPC BLD CALC-MCNC: 9.2 FL (ref 9.4–12.4)
NEUTROPHILS # BLD AUTO: 21.31 K/UL (ref 1.8–7.7)
NEUTROPHILS NFR BLD AUTO: 87.5 % (ref 53–65)
NEUTS SEG NFR BLD MANUAL: 85 % (ref 50–62)
NITRITE UR QL STRIP: POSITIVE
NRBC # BLD AUTO: 0 X10E3/UL
NRBC, AUTO (.00): 0 %
PH UR STRIP: 7 PH UNITS
PLATELET # BLD AUTO: 381 K/UL (ref 150–400)
PLATELET MORPHOLOGY: NORMAL
POTASSIUM SERPL-SCNC: 3.7 MMOL/L (ref 3.5–5.1)
PROT SERPL-MCNC: 9 G/DL (ref 6.4–8.2)
PROT UR QL STRIP: >=300
RBC # BLD AUTO: 4.78 M/UL (ref 4.6–6.2)
RBC # UR STRIP: ABNORMAL /UL
RBC #/AREA URNS HPF: ABNORMAL /HPF
RBC MORPH BLD: NORMAL
SARS-COV-2 RDRP RESP QL NAA+PROBE: NEGATIVE
SODIUM SERPL-SCNC: 130 MMOL/L (ref 136–145)
SP GR UR STRIP: 1.02
TRI-PHOS CRY #/AREA URNS LPF: ABNORMAL /LPF
TROPONIN I SERPL DL<=0.01 NG/ML-MCNC: 4.3 PG/ML
UROBILINOGEN UR STRIP-ACNC: 1 MG/DL
WBC # BLD AUTO: 24.37 K/UL (ref 4.5–11)
WBC #/AREA URNS HPF: ABNORMAL /HPF

## 2024-02-01 PROCEDURE — 87040 BLOOD CULTURE FOR BACTERIA: CPT | Performed by: INTERNAL MEDICINE

## 2024-02-01 PROCEDURE — 87635 SARS-COV-2 COVID-19 AMP PRB: CPT | Performed by: INTERNAL MEDICINE

## 2024-02-01 PROCEDURE — 99285 EMERGENCY DEPT VISIT HI MDM: CPT | Mod: 25

## 2024-02-01 PROCEDURE — 80053 COMPREHEN METABOLIC PANEL: CPT | Performed by: INTERNAL MEDICINE

## 2024-02-01 PROCEDURE — 96365 THER/PROPH/DIAG IV INF INIT: CPT

## 2024-02-01 PROCEDURE — 25000003 PHARM REV CODE 250: Performed by: INTERNAL MEDICINE

## 2024-02-01 PROCEDURE — 87804 INFLUENZA ASSAY W/OPTIC: CPT | Performed by: INTERNAL MEDICINE

## 2024-02-01 PROCEDURE — 81003 URINALYSIS AUTO W/O SCOPE: CPT | Performed by: INTERNAL MEDICINE

## 2024-02-01 PROCEDURE — 84484 ASSAY OF TROPONIN QUANT: CPT | Performed by: INTERNAL MEDICINE

## 2024-02-01 PROCEDURE — 63600175 PHARM REV CODE 636 W HCPCS: Performed by: INTERNAL MEDICINE

## 2024-02-01 PROCEDURE — 85025 COMPLETE CBC W/AUTO DIFF WBC: CPT | Performed by: INTERNAL MEDICINE

## 2024-02-01 PROCEDURE — 96375 TX/PRO/DX INJ NEW DRUG ADDON: CPT

## 2024-02-01 PROCEDURE — 96376 TX/PRO/DX INJ SAME DRUG ADON: CPT

## 2024-02-01 PROCEDURE — 99284 EMERGENCY DEPT VISIT MOD MDM: CPT | Mod: ,,, | Performed by: INTERNAL MEDICINE

## 2024-02-01 PROCEDURE — 87086 URINE CULTURE/COLONY COUNT: CPT | Performed by: INTERNAL MEDICINE

## 2024-02-01 PROCEDURE — 82962 GLUCOSE BLOOD TEST: CPT

## 2024-02-01 PROCEDURE — 83605 ASSAY OF LACTIC ACID: CPT | Performed by: INTERNAL MEDICINE

## 2024-02-01 RX ORDER — ONDANSETRON HYDROCHLORIDE 2 MG/ML
4 INJECTION, SOLUTION INTRAVENOUS
Status: COMPLETED | OUTPATIENT
Start: 2024-02-01 | End: 2024-02-01

## 2024-02-01 RX ORDER — SODIUM CHLORIDE 9 MG/ML
1000 INJECTION, SOLUTION INTRAVENOUS
Status: COMPLETED | OUTPATIENT
Start: 2024-02-01 | End: 2024-02-01

## 2024-02-01 RX ADMIN — ONDANSETRON 4 MG: 2 INJECTION INTRAMUSCULAR; INTRAVENOUS at 03:02

## 2024-02-01 RX ADMIN — SODIUM CHLORIDE 1000 ML: 9 INJECTION, SOLUTION INTRAVENOUS at 12:02

## 2024-02-01 RX ADMIN — PIPERACILLIN SODIUM AND TAZOBACTAM SODIUM 4.5 G: 4; .5 INJECTION, POWDER, LYOPHILIZED, FOR SOLUTION INTRAVENOUS at 01:02

## 2024-02-01 RX ADMIN — ONDANSETRON 4 MG: 2 INJECTION INTRAMUSCULAR; INTRAVENOUS at 12:02

## 2024-02-01 NOTE — ED TRIAGE NOTES
Pt to Er with c/o nausea and chills onset last pm states he thinks it is his right foot that he has been getting wound care on every Tuesday from Dr Myles in [place]. Dressing removed no signs of acute infection there is wounds to right outter foot and pad of big toe. Small amt of drainage. Pt states he saw Dr Llanos last Thursday and Dr. Myles onTuesday. Pt deines pain at this time. Pt is nauseated upon arrival

## 2024-02-01 NOTE — ED PROVIDER NOTES
Encounter Date: 2/1/2024       History     Chief Complaint   Patient presents with    Chills    Nausea     Patient comes in complaining of fever for the last 2 days.  The patient has a chronic right foot ulcer being followed to wound clinic in [facility] by Plastic surgery.  He seen every Tuesday, he was seen this past Tuesday where they go in debrided.  He has not been on antibiotics recently.  He states he is having fever and chills and a dry cough.        Review of patient's allergies indicates:   Allergen Reactions    Nubain [nalbuphine]      Past Medical History:   Diagnosis Date    Anemia     Arthritis     Diabetes mellitus, type 2     Dry skin     Hypertension     Immune deficiency disorder     Muscle pain     Neuropathy     Peripheral vascular disease      Past Surgical History:   Procedure Laterality Date    APPENDECTOMY      L index finger repair from a cat bite during childhood      Surgical debridement of R foot 8/2020       Family History   Problem Relation Age of Onset    Diabetes Mother     Hypertension Father      Social History     Tobacco Use    Smoking status: Never    Smokeless tobacco: Never   Substance Use Topics    Alcohol use: Not Currently    Drug use: Never     Review of Systems   Constitutional:  Negative for fever.   HENT:  Negative for sore throat.    Respiratory:  Negative for shortness of breath.    Cardiovascular:  Negative for chest pain.   Gastrointestinal:  Negative for nausea.   Genitourinary:  Negative for dysuria.   Musculoskeletal:  Negative for back pain.   Skin:  Negative for rash.   Neurological:  Negative for weakness.   Hematological:  Does not bruise/bleed easily.       Physical Exam     Initial Vitals [02/01/24 1121]   BP Pulse Resp Temp SpO2   (!) 178/84 101 17 97.9 °F (36.6 °C) 99 %      MAP       --         Physical Exam    Nursing note and vitals reviewed.  Constitutional: He appears well-developed.   HENT:   Head: Normocephalic.   Eyes: Pupils are equal,  round, and reactive to light.   Neck:   Normal range of motion.  Cardiovascular:  Regular rhythm and normal pulses.   Tachycardia present.   Exam reveals no gallop and no S3.       No murmur heard.  Pulmonary/Chest: Effort normal. He has rhonchi.   Abdominal: Abdomen is soft and flat. Bowel sounds are normal. There is no abdominal tenderness.   Musculoskeletal:         General: Normal range of motion.      Cervical back: Normal range of motion.      Right foot: Charcot foot present.      Left foot: Normal.        Legs:       Comments: PATIENT HAS A LARGE ULCER THE BASE OF THE RIGHT 5TH METATARSAL AREA NO DRAINAGE BUT SOME YELLOW DISCHARGE.     Neurological: He is alert. He has normal strength and normal reflexes. No cranial nerve deficit. GCS eye subscore is 4. GCS verbal subscore is 5. GCS motor subscore is 6.   Skin: Skin is warm.         Medical Screening Exam   See Full Note    ED Course   Procedures  Labs Reviewed   URINALYSIS, REFLEX TO URINE CULTURE - Abnormal; Notable for the following components:       Result Value    Leukocytes, UA Trace (*)     Nitrites, UA Positive (*)     Protein, UA >=300 (*)     Glucose,  (*)     Ketones, UA 40 (*)     Bilirubin, UA Moderate (*)     Blood, UA Small (*)     All other components within normal limits   CBC WITH DIFFERENTIAL - Abnormal; Notable for the following components:    WBC 24.37 (*)     MPV 9.2 (*)     Neutrophils % 87.5 (*)     Lymphocytes % 4.8 (*)     Monocytes % 6.5 (*)     Eosinophils % 0.2 (*)     Immature Granulocytes % 0.8 (*)     Neutrophils, Abs 21.31 (*)     Monocytes, Absolute 1.58 (*)     Immature Granulocytes, Absolute 0.19 (*)     All other components within normal limits   MANUAL DIFFERENTIAL - Abnormal; Notable for the following components:    Segmented Neutrophils, Man % 85 (*)     Lymphocytes, Man % 11 (*)     All other components within normal limits   COMPREHENSIVE METABOLIC PANEL - Abnormal; Notable for the following components:     Sodium 130 (*)     Chloride 94 (*)     Glucose 189 (*)     Total Protein 9.0 (*)     Albumin 3.2 (*)     Globulin 5.8 (*)     Alk Phos 135 (*)     All other components within normal limits   URINALYSIS, MICROSCOPIC - Abnormal; Notable for the following components:    WBC, UA Too Numerous To Count (*)     Bacteria, UA Loaded (*)     Triple Phosphate Crystals, UA Moderate (*)     All other components within normal limits   POCT GLUCOSE MONITORING CONTINUOUS - Abnormal; Notable for the following components:    POC Glucose 160 (*)     All other components within normal limits   RAPID INFLUENZA A/B - Normal   SARS-COV-2 RNA AMPLIFICATION, QUAL - Normal    Narrative:     Negative SARS-CoV results should not be used as the sole basis for treatment or patient management decisions; negative results should be considered in the context of a patient's recent exposures, history and the presene of clinical signs and symptoms consistent with COVID-19.  Negative results should be treated as presumptive and confirmed by molecular assay, if necessary for patient management.   LACTIC ACID, PLASMA - Normal   TROPONIN I - Normal   CULTURE, BLOOD   CULTURE, BLOOD   CULTURE, URINE   CBC W/ AUTO DIFFERENTIAL    Narrative:     The following orders were created for panel order CBC auto differential.  Procedure                               Abnormality         Status                     ---------                               -----------         ------                     CBC with Differential[[id number]]       Abnormal            Final result               Manual Differential[[id number]]         Abnormal            Final result                 Please view results for these tests on the individual orders.          Imaging Results              CT Foot Without Contrast Right (Final result)  Result time 02/01/24 14:02:07      Final result by Keaton Hood MD (02/01/24 14:02:07)                   Impression:      Evidence of acute  osteomyelitis base of the 5th metatarsal at the minimum.  This is at the level of an area of soft tissue ulceration.  Consider further evaluation with MRI or three-phase bone scan to evaluate for any other potential areas of acute osteomyelitis if clinically relevant    Previous amputation of the 2nd through 5th metatarsals    Soft tissue swelling about the foot.  No obvious soft tissue abscess      Electronically signed by: Keaton Hood  Date:    02/01/2024  Time:    14:02               Narrative:    EXAMINATION:  CT FOOT WITHOUT CONTRAST RIGHT    CLINICAL HISTORY:  Foot swelling, diabetic, osteomyelitis suspected, xray done;.    COMPARISON:  No previous CT.  X-ray right foot October 9, 2023 available.    TECHNIQUE:  Spiral CT sections were obtained through the right foot without IV contrast.  Sagittal and coronal multiplanar reconstruction images are also examined.    The CT examination was performed using one or more of the following dose reduction techniques: Automated exposure control, adjustment of the mA and kV according to patient's size, use of acute or iterative reconstruction techniques.    FINDINGS:  As on the comparison x-ray, there has been previous amputation of the 2nd through 5th digits at the metatarsal levels.  There is also chronic erosion of the distal aspect 1st metatarsal and base 1st proximal phalanx.    There is soft tissue ulceration at the lateral margin of the midfoot.  There is ill-defined bony erosion of the adjacent base of the 5th metatarsal compatible with changes of acute osteomyelitis.  No additional CT changes consistent with acute osteomyelitis are identified with certainty, though CT is not as sensitive as MRI or nuclear scintigraphy to early osteomyelitis.  There is soft tissue swelling about the foot.  There is no acute fracture.  There is moderate plantar calcaneal spur formation.                                       CT Chest Abdomen Pelvis Without Contrast (XPD) (Final  result)  Result time 02/01/24 13:17:27      Final result by Antonio Stafford MD (02/01/24 13:17:27)                   Impression:      No acute abnormality identified in the chest, abdomen, or pelvis.    Along the inferior margin of the liver are 3 peripherally calcified rounded structures that favor the appearance of gallstones.  There are changes from cholecystectomy.  Correlate with patient history.  No surrounding inflammatory changes to suggest an acute process in this location.      Electronically signed by: Antonio Stafford  Date:    02/01/2024  Time:    13:17               Narrative:    EXAMINATION:  CT CHEST ABDOMEN PELVIS WITHOUT CONTRAST(XPD)    CLINICAL HISTORY:  sepsis;    TECHNIQUE:  Low dose axial images, sagittal and coronal reformations were obtained from the thoracic inlet to the pubic symphysis .  Oral contrast was not administered. The CT examination was performed using one or more of the following dose reduction techniques: Automated exposure control, adjustment of the mA and kV according to patient's size, use of acute or iterative reconstruction techniques.    COMPARISON:  None    FINDINGS:  Chest: The heart is normal in size.  Thoracic aorta normal in course and caliber.  No adenopathy in the chest.    The lungs appear clear bilaterally.  No pneumothorax.  No pleural effusion.    Abdomen and pelvis: No focal hepatic lesion.  Gallbladder is absent.  Along the inferior margin of the gallbladder are 3 rounded peripherally calcified structures each measuring up to 1 cm that have the appearance of gallstones.  There is no surrounding inflammatory change.    The adrenals and kidneys appear normal.  Pancreas and spleen unremarkable.    No pneumoperitoneum.  No ascites.  No bowel obstruction or acute bowel abnormality.  No adenopathy.  Few aortic and iliac calcifications.  No abdominal aortic aneurysm.  Urinary bladder unremarkable.  No adenopathy.  Previous midline laparotomy changes are  seen.    Musculoskeletal: No acute fracture or osseous lesion.  Degenerative changes throughout the spine and hips.                                       X-Ray Chest PA And Lateral (Final result)  Result time 02/01/24 12:07:15      Final result by Óscar Calles DO (02/01/24 12:07:15)                   Impression:      Focal patchy airspace opacity located within the left lower lobe, atelectasis versus pneumonia.      Electronically signed by: Óscar Calles  Date:    02/01/2024  Time:    12:07               Narrative:    EXAMINATION:  XR CHEST PA AND LATERAL    CLINICAL HISTORY:  Fever, unspecified    TECHNIQUE:  XR CHEST PA AND LATERAL    COMPARISON:  10/09/2023    FINDINGS:  No lines or tubes.    Focal patchy airspace opacity located within the left lower lobe, atelectasis versus pneumonia.    Normal pleura.    Cardiac silhouette is similar to comparison exam.    No obvious acute bone findings.                                       Medications   0.9%  NaCl infusion (1,000 mLs Intravenous New Bag 2/1/24 1246)   ondansetron injection 4 mg (4 mg Intravenous Given 2/1/24 1246)   piperacillin-tazobactam (ZOSYN) 4.5 g in dextrose 5 % in water (D5W) 100 mL IVPB (MB+) (0 g Intravenous Stopped 2/1/24 1359)   ondansetron injection 4 mg (4 mg Intravenous Given 2/1/24 1547)     Medical Decision Making  Patient has a chronic right foot ulcer follow up at [facility] rule out infection, viral illness including COVID influenza, pneumonia.    Amount and/or Complexity of Data Reviewed  Labs: ordered. Decision-making details documented in ED Course.  Radiology: ordered. Decision-making details documented in ED Course.  ECG/medicine tests:  Decision-making details documented in ED Course.  Discussion of management or test interpretation with external provider(s): Patient's CT scan shows acute osteomyelitis of the right metatarsal bone consistent with ulcer is.  The patient said he has been seen by Dr. Mcconnell at [facility]  in the past and also at [facility] in [place].  Patient requests to go to [place].  Patient also has urinary tract infection, cultures have been done, lactic acid is normal, cardiac workup is negative, patient received IV Zosyn and IV fluids.  Will initiate transfer to high level of care.    Risk  Prescription drug management.               ED Course as of 02/01/24 1702   Thu Feb 01, 2024   1128 POC Glucose(!): 160 [PW]   1207 CBC auto differential(!) [PW]   1210 Influenza A: Negative [PW]   1210 Influenza B, Molecular: Negative [PW]   1210 ID NOW COVID-19, (GABE): Negative [PW]   1210 X-Ray Chest PA And Lateral [PW]   1248 Lactate, Crescencio: 0.9 [PW]   1255 Urinalysis, Reflex to Urine Culture Urine, Clean Catch(!) [PW]   1255 Urinalysis, Reflex to Urine Culture Urine, Clean Catch(!) [PW]   1300 Patient with nausea vomiting give Zofran.  On his way to CT scan now no acute distress.  White count [id number] will get also CT of his chest abdomen pelvis as well as right foot to rule out osteomyelitis. [PW]   1306 WBC, UA(!): Too Numerous To Count [PW]   1307 Bacteria, UA(!): Loaded [PW]   1323 Comprehensive metabolic panel(!) [PW]   1343 CT Chest Abdomen Pelvis Without Contrast (XPD) [PW]   1511 PATIENT IS ACCEPTED TO [facility] IN [place] BY DR. ALFONSO. [PW]   1702 EMS here to  patient. [PW]      ED Course User Index  [PW] Balta An MD                           Clinical Impression:   Final diagnoses:  [R50.9] Fever  [M86.171] Acute osteomyelitis of metatarsal bone of right foot (Primary)  [N39.0] Urinary tract infection without hematuria, site unspecified  [R50.9] Fever, unspecified fever cause        ED Disposition Condition    Transfer to Another Facility Stable                Balta An MD  02/01/24 1620       Balta An MD  02/01/24 1702

## 2024-02-01 NOTE — ED NOTES
[facility] for pt transport. Report given to Katarina and verbalized understanding.

## 2024-02-03 LAB — UA COMPLETE W REFLEX CULTURE PNL UR: ABNORMAL

## 2024-02-06 NOTE — ADDENDUM NOTE
Encounter addended by: Vaishnavi Bolton on: 2/6/2024 3:15 PM   Actions taken: SmartForm saved, Flowsheet accepted, Charge Capture section accepted

## 2024-02-07 LAB
BACTERIA BLD CULT: NORMAL
BACTERIA BLD CULT: NORMAL

## 2024-02-08 DIAGNOSIS — S81.801D UNSPECIFIED OPEN WOUND, RIGHT LOWER LEG, SUBSEQUENT ENCOUNTER: Primary | ICD-10-CM

## 2024-02-13 ENCOUNTER — LAB REQUISITION (OUTPATIENT)
Dept: LAB | Facility: HOSPITAL | Age: 69
End: 2024-02-13
Attending: FAMILY MEDICINE
Payer: COMMERCIAL

## 2024-02-13 DIAGNOSIS — M86.171 OTHER ACUTE OSTEOMYELITIS, RIGHT ANKLE AND FOOT: ICD-10-CM

## 2024-02-13 DIAGNOSIS — T87.43 INFECTION OF AMPUTATION STUMP, RIGHT LOWER EXTREMITY: ICD-10-CM

## 2024-02-13 LAB
CK SERPL-CCNC: 35 U/L (ref 39–308)
CRP SERPL-MCNC: 1.22 MG/DL (ref 0–0.8)
ERYTHROCYTE [SEDIMENTATION RATE] IN BLOOD BY WESTERGREN METHOD: 52 MM/HR (ref 0–20)

## 2024-02-13 PROCEDURE — 86140 C-REACTIVE PROTEIN: CPT | Performed by: FAMILY MEDICINE

## 2024-02-13 PROCEDURE — 82550 ASSAY OF CK (CPK): CPT | Performed by: FAMILY MEDICINE

## 2024-02-13 PROCEDURE — 85651 RBC SED RATE NONAUTOMATED: CPT | Performed by: FAMILY MEDICINE

## 2024-02-19 ENCOUNTER — LAB REQUISITION (OUTPATIENT)
Dept: LAB | Facility: HOSPITAL | Age: 69
End: 2024-02-19
Attending: FAMILY MEDICINE
Payer: COMMERCIAL

## 2024-02-19 DIAGNOSIS — T87.43 INFECTION OF AMPUTATION STUMP, RIGHT LOWER EXTREMITY: ICD-10-CM

## 2024-02-19 DIAGNOSIS — E11.69 TYPE 2 DIABETES MELLITUS WITH OTHER SPECIFIED COMPLICATION: ICD-10-CM

## 2024-02-19 DIAGNOSIS — M86.171 OTHER ACUTE OSTEOMYELITIS, RIGHT ANKLE AND FOOT: ICD-10-CM

## 2024-02-19 LAB
ANION GAP SERPL CALCULATED.3IONS-SCNC: 11 MMOL/L (ref 7–16)
BASOPHILS # BLD AUTO: 0.08 K/UL (ref 0–0.2)
BASOPHILS NFR BLD AUTO: 0.9 % (ref 0–1)
BUN SERPL-MCNC: 19 MG/DL (ref 7–18)
BUN/CREAT SERPL: 18 (ref 6–20)
CALCIUM SERPL-MCNC: 9.1 MG/DL (ref 8.5–10.1)
CHLORIDE SERPL-SCNC: 101 MMOL/L (ref 98–107)
CK SERPL-CCNC: 80 U/L (ref 39–308)
CO2 SERPL-SCNC: 30 MMOL/L (ref 21–32)
CREAT SERPL-MCNC: 1.07 MG/DL (ref 0.7–1.3)
DIFFERENTIAL METHOD BLD: ABNORMAL
EGFR (NO RACE VARIABLE) (RUSH/TITUS): 77 ML/MIN/1.73M2
EOSINOPHIL # BLD AUTO: 0.17 K/UL (ref 0–0.5)
EOSINOPHIL NFR BLD AUTO: 1.9 % (ref 1–4)
ERYTHROCYTE [DISTWIDTH] IN BLOOD BY AUTOMATED COUNT: 13.1 % (ref 11.5–14.5)
ERYTHROCYTE [SEDIMENTATION RATE] IN BLOOD BY WESTERGREN METHOD: 35 MM/HR (ref 0–20)
GLUCOSE SERPL-MCNC: 282 MG/DL (ref 74–106)
HCT VFR BLD AUTO: 39.4 % (ref 40–54)
HGB BLD-MCNC: 12.9 G/DL (ref 13.5–18)
IMM GRANULOCYTES # BLD AUTO: 0.07 K/UL (ref 0–0.04)
IMM GRANULOCYTES NFR BLD: 0.8 % (ref 0–0.4)
LYMPHOCYTES # BLD AUTO: 2.02 K/UL (ref 1–4.8)
LYMPHOCYTES NFR BLD AUTO: 22.2 % (ref 27–41)
MCH RBC QN AUTO: 28.7 PG (ref 27–31)
MCHC RBC AUTO-ENTMCNC: 32.7 G/DL (ref 32–36)
MCV RBC AUTO: 87.6 FL (ref 80–96)
MONOCYTES # BLD AUTO: 0.75 K/UL (ref 0–0.8)
MONOCYTES NFR BLD AUTO: 8.2 % (ref 2–6)
MPC BLD CALC-MCNC: 9.6 FL (ref 9.4–12.4)
NEUTROPHILS # BLD AUTO: 6.01 K/UL (ref 1.8–7.7)
NEUTROPHILS NFR BLD AUTO: 66 % (ref 53–65)
NRBC # BLD AUTO: 0 X10E3/UL
NRBC, AUTO (.00): 0 %
PLATELET # BLD AUTO: 395 K/UL (ref 150–400)
POTASSIUM SERPL-SCNC: 3.8 MMOL/L (ref 3.5–5.1)
RBC # BLD AUTO: 4.5 M/UL (ref 4.6–6.2)
SODIUM SERPL-SCNC: 138 MMOL/L (ref 136–145)
WBC # BLD AUTO: 9.1 K/UL (ref 4.5–11)

## 2024-02-19 PROCEDURE — 80048 BASIC METABOLIC PNL TOTAL CA: CPT | Performed by: FAMILY MEDICINE

## 2024-02-19 PROCEDURE — 82550 ASSAY OF CK (CPK): CPT | Performed by: FAMILY MEDICINE

## 2024-02-19 PROCEDURE — 85025 COMPLETE CBC W/AUTO DIFF WBC: CPT | Performed by: FAMILY MEDICINE

## 2024-02-19 PROCEDURE — 86140 C-REACTIVE PROTEIN: CPT | Performed by: FAMILY MEDICINE

## 2024-02-19 PROCEDURE — 85651 RBC SED RATE NONAUTOMATED: CPT | Performed by: FAMILY MEDICINE

## 2024-02-20 LAB — CRP SERPL-MCNC: 0.54 MG/DL (ref 0–0.8)

## 2024-02-26 ENCOUNTER — LAB REQUISITION (OUTPATIENT)
Dept: LAB | Facility: HOSPITAL | Age: 69
End: 2024-02-26
Attending: FAMILY MEDICINE
Payer: COMMERCIAL

## 2024-02-26 DIAGNOSIS — E11.69 TYPE 2 DIABETES MELLITUS WITH OTHER SPECIFIED COMPLICATION: ICD-10-CM

## 2024-02-26 DIAGNOSIS — T87.43 INFECTION OF AMPUTATION STUMP, RIGHT LOWER EXTREMITY: ICD-10-CM

## 2024-02-26 DIAGNOSIS — M86.171 OTHER ACUTE OSTEOMYELITIS, RIGHT ANKLE AND FOOT: ICD-10-CM

## 2024-02-26 LAB
ANION GAP SERPL CALCULATED.3IONS-SCNC: 12 MMOL/L (ref 7–16)
BASOPHILS # BLD AUTO: 0.06 K/UL (ref 0–0.2)
BASOPHILS NFR BLD AUTO: 0.7 % (ref 0–1)
BUN SERPL-MCNC: 15 MG/DL (ref 7–18)
BUN/CREAT SERPL: 12 (ref 6–20)
CALCIUM SERPL-MCNC: 8.7 MG/DL (ref 8.5–10.1)
CHLORIDE SERPL-SCNC: 97 MMOL/L (ref 98–107)
CK SERPL-CCNC: 66 U/L (ref 39–308)
CO2 SERPL-SCNC: 28 MMOL/L (ref 21–32)
CREAT SERPL-MCNC: 1.29 MG/DL (ref 0.7–1.3)
CRP SERPL-MCNC: 2.4 MG/DL (ref 0–0.8)
DIFFERENTIAL METHOD BLD: ABNORMAL
EGFR (NO RACE VARIABLE) (RUSH/TITUS): 61 ML/MIN/1.73M2
EOSINOPHIL # BLD AUTO: 0.22 K/UL (ref 0–0.5)
EOSINOPHIL NFR BLD AUTO: 2.4 % (ref 1–4)
ERYTHROCYTE [DISTWIDTH] IN BLOOD BY AUTOMATED COUNT: 13.2 % (ref 11.5–14.5)
ERYTHROCYTE [SEDIMENTATION RATE] IN BLOOD BY WESTERGREN METHOD: 54 MM/HR (ref 0–20)
GLUCOSE SERPL-MCNC: 228 MG/DL (ref 74–106)
HCT VFR BLD AUTO: 36.1 % (ref 40–54)
HGB BLD-MCNC: 11.9 G/DL (ref 13.5–18)
IMM GRANULOCYTES # BLD AUTO: 0.05 K/UL (ref 0–0.04)
IMM GRANULOCYTES NFR BLD: 0.5 % (ref 0–0.4)
LYMPHOCYTES # BLD AUTO: 1.83 K/UL (ref 1–4.8)
LYMPHOCYTES NFR BLD AUTO: 20.1 % (ref 27–41)
MCH RBC QN AUTO: 29.1 PG (ref 27–31)
MCHC RBC AUTO-ENTMCNC: 33 G/DL (ref 32–36)
MCV RBC AUTO: 88.3 FL (ref 80–96)
MONOCYTES # BLD AUTO: 0.84 K/UL (ref 0–0.8)
MONOCYTES NFR BLD AUTO: 9.2 % (ref 2–6)
MPC BLD CALC-MCNC: 9.9 FL (ref 9.4–12.4)
NEUTROPHILS # BLD AUTO: 6.12 K/UL (ref 1.8–7.7)
NEUTROPHILS NFR BLD AUTO: 67.1 % (ref 53–65)
NRBC # BLD AUTO: 0 X10E3/UL
NRBC, AUTO (.00): 0 %
PLATELET # BLD AUTO: 248 K/UL (ref 150–400)
POTASSIUM SERPL-SCNC: 3.6 MMOL/L (ref 3.5–5.1)
RBC # BLD AUTO: 4.09 M/UL (ref 4.6–6.2)
SODIUM SERPL-SCNC: 133 MMOL/L (ref 136–145)
WBC # BLD AUTO: 9.12 K/UL (ref 4.5–11)

## 2024-02-26 PROCEDURE — 82550 ASSAY OF CK (CPK): CPT | Performed by: FAMILY MEDICINE

## 2024-02-26 PROCEDURE — 86140 C-REACTIVE PROTEIN: CPT | Performed by: FAMILY MEDICINE

## 2024-02-26 PROCEDURE — 85651 RBC SED RATE NONAUTOMATED: CPT | Performed by: FAMILY MEDICINE

## 2024-02-26 PROCEDURE — 85025 COMPLETE CBC W/AUTO DIFF WBC: CPT | Performed by: FAMILY MEDICINE

## 2024-02-26 PROCEDURE — 80048 BASIC METABOLIC PNL TOTAL CA: CPT | Performed by: FAMILY MEDICINE

## 2024-03-04 ENCOUNTER — LAB REQUISITION (OUTPATIENT)
Dept: LAB | Facility: HOSPITAL | Age: 69
End: 2024-03-04
Attending: FAMILY MEDICINE
Payer: COMMERCIAL

## 2024-03-04 DIAGNOSIS — T87.43 INFECTION OF AMPUTATION STUMP, RIGHT LOWER EXTREMITY: ICD-10-CM

## 2024-03-04 DIAGNOSIS — E11.69 TYPE 2 DIABETES MELLITUS WITH OTHER SPECIFIED COMPLICATION: ICD-10-CM

## 2024-03-04 DIAGNOSIS — M86.171 OTHER ACUTE OSTEOMYELITIS, RIGHT ANKLE AND FOOT: ICD-10-CM

## 2024-03-04 LAB
ANION GAP SERPL CALCULATED.3IONS-SCNC: 10 MMOL/L (ref 7–16)
BASOPHILS # BLD AUTO: 0.08 K/UL (ref 0–0.2)
BASOPHILS NFR BLD AUTO: 0.5 % (ref 0–1)
BUN SERPL-MCNC: 15 MG/DL (ref 7–18)
BUN/CREAT SERPL: 13 (ref 6–20)
CALCIUM SERPL-MCNC: 9 MG/DL (ref 8.5–10.1)
CHLORIDE SERPL-SCNC: 98 MMOL/L (ref 98–107)
CK SERPL-CCNC: 73 U/L (ref 39–308)
CO2 SERPL-SCNC: 30 MMOL/L (ref 21–32)
CREAT SERPL-MCNC: 1.12 MG/DL (ref 0.7–1.3)
DIFFERENTIAL METHOD BLD: ABNORMAL
EGFR (NO RACE VARIABLE) (RUSH/TITUS): 72 ML/MIN/1.73M2
EOSINOPHIL # BLD AUTO: 0.28 K/UL (ref 0–0.5)
EOSINOPHIL NFR BLD AUTO: 1.9 % (ref 1–4)
ERYTHROCYTE [DISTWIDTH] IN BLOOD BY AUTOMATED COUNT: 13.2 % (ref 11.5–14.5)
ERYTHROCYTE [SEDIMENTATION RATE] IN BLOOD BY WESTERGREN METHOD: 50 MM/HR (ref 0–20)
GLUCOSE SERPL-MCNC: 221 MG/DL (ref 74–106)
HCT VFR BLD AUTO: 35.4 % (ref 40–54)
HGB BLD-MCNC: 12 G/DL (ref 13.5–18)
IMM GRANULOCYTES # BLD AUTO: 0.09 K/UL (ref 0–0.04)
IMM GRANULOCYTES NFR BLD: 0.6 % (ref 0–0.4)
LYMPHOCYTES # BLD AUTO: 1.59 K/UL (ref 1–4.8)
LYMPHOCYTES NFR BLD AUTO: 10.6 % (ref 27–41)
MCH RBC QN AUTO: 29.5 PG (ref 27–31)
MCHC RBC AUTO-ENTMCNC: 33.9 G/DL (ref 32–36)
MCV RBC AUTO: 87 FL (ref 80–96)
MONOCYTES # BLD AUTO: 1.15 K/UL (ref 0–0.8)
MONOCYTES NFR BLD AUTO: 7.6 % (ref 2–6)
MPC BLD CALC-MCNC: 10 FL (ref 9.4–12.4)
NEUTROPHILS # BLD AUTO: 11.88 K/UL (ref 1.8–7.7)
NEUTROPHILS NFR BLD AUTO: 78.8 % (ref 53–65)
NRBC # BLD AUTO: 0 X10E3/UL
NRBC, AUTO (.00): 0 %
PLATELET # BLD AUTO: 228 K/UL (ref 150–400)
PLATELET MORPHOLOGY: NORMAL
POTASSIUM SERPL-SCNC: 3.9 MMOL/L (ref 3.5–5.1)
RBC # BLD AUTO: 4.07 M/UL (ref 4.6–6.2)
RBC MORPH BLD: NORMAL
SODIUM SERPL-SCNC: 134 MMOL/L (ref 136–145)
WBC # BLD AUTO: 15.07 K/UL (ref 4.5–11)

## 2024-03-04 PROCEDURE — 80048 BASIC METABOLIC PNL TOTAL CA: CPT | Performed by: FAMILY MEDICINE

## 2024-03-04 PROCEDURE — 85025 COMPLETE CBC W/AUTO DIFF WBC: CPT | Performed by: FAMILY MEDICINE

## 2024-03-04 PROCEDURE — 82550 ASSAY OF CK (CPK): CPT | Performed by: FAMILY MEDICINE

## 2024-03-04 PROCEDURE — 85651 RBC SED RATE NONAUTOMATED: CPT | Performed by: FAMILY MEDICINE

## 2024-03-04 PROCEDURE — 86140 C-REACTIVE PROTEIN: CPT | Performed by: FAMILY MEDICINE

## 2024-03-05 LAB — CRP SERPL-MCNC: 2.46 MG/DL (ref 0–0.8)

## 2024-03-14 ENCOUNTER — LAB REQUISITION (OUTPATIENT)
Dept: LAB | Facility: HOSPITAL | Age: 69
End: 2024-03-14
Attending: FAMILY MEDICINE
Payer: COMMERCIAL

## 2024-03-14 DIAGNOSIS — T87.43 INFECTION OF AMPUTATION STUMP, RIGHT LOWER EXTREMITY: ICD-10-CM

## 2024-03-14 DIAGNOSIS — E11.69 TYPE 2 DIABETES MELLITUS WITH OTHER SPECIFIED COMPLICATION: ICD-10-CM

## 2024-03-14 DIAGNOSIS — M86.171 OTHER ACUTE OSTEOMYELITIS, RIGHT ANKLE AND FOOT: ICD-10-CM

## 2024-03-14 LAB
ANION GAP SERPL CALCULATED.3IONS-SCNC: 9 MMOL/L (ref 7–16)
BASOPHILS # BLD AUTO: 0.07 K/UL (ref 0–0.2)
BASOPHILS NFR BLD AUTO: 0.8 % (ref 0–1)
BUN SERPL-MCNC: 12 MG/DL (ref 7–18)
BUN/CREAT SERPL: 11 (ref 6–20)
CALCIUM SERPL-MCNC: 7.7 MG/DL (ref 8.5–10.1)
CHLORIDE SERPL-SCNC: 106 MMOL/L (ref 98–107)
CK SERPL-CCNC: 112 U/L (ref 39–308)
CO2 SERPL-SCNC: 27 MMOL/L (ref 21–32)
CREAT SERPL-MCNC: 1.13 MG/DL (ref 0.7–1.3)
DIFFERENTIAL METHOD BLD: ABNORMAL
EGFR (NO RACE VARIABLE) (RUSH/TITUS): 72 ML/MIN/1.73M2
EOSINOPHIL # BLD AUTO: 0.32 K/UL (ref 0–0.5)
EOSINOPHIL NFR BLD AUTO: 3.5 % (ref 1–4)
ERYTHROCYTE [DISTWIDTH] IN BLOOD BY AUTOMATED COUNT: 13.2 % (ref 11.5–14.5)
ERYTHROCYTE [SEDIMENTATION RATE] IN BLOOD BY WESTERGREN METHOD: 30 MM/HR (ref 0–20)
GLUCOSE SERPL-MCNC: 176 MG/DL (ref 74–106)
HCT VFR BLD AUTO: 31 % (ref 40–54)
HGB BLD-MCNC: 10.7 G/DL (ref 13.5–18)
IMM GRANULOCYTES # BLD AUTO: 0.08 K/UL (ref 0–0.04)
IMM GRANULOCYTES NFR BLD: 0.9 % (ref 0–0.4)
LYMPHOCYTES # BLD AUTO: 2.24 K/UL (ref 1–4.8)
LYMPHOCYTES NFR BLD AUTO: 24.8 % (ref 27–41)
MCH RBC QN AUTO: 29.6 PG (ref 27–31)
MCHC RBC AUTO-ENTMCNC: 34.5 G/DL (ref 32–36)
MCV RBC AUTO: 85.6 FL (ref 80–96)
MONOCYTES # BLD AUTO: 0.87 K/UL (ref 0–0.8)
MONOCYTES NFR BLD AUTO: 9.6 % (ref 2–6)
MPC BLD CALC-MCNC: 10 FL (ref 9.4–12.4)
NEUTROPHILS # BLD AUTO: 5.47 K/UL (ref 1.8–7.7)
NEUTROPHILS NFR BLD AUTO: 60.4 % (ref 53–65)
NRBC # BLD AUTO: 0 X10E3/UL
NRBC, AUTO (.00): 0 %
PLATELET # BLD AUTO: 258 K/UL (ref 150–400)
POTASSIUM SERPL-SCNC: 3.3 MMOL/L (ref 3.5–5.1)
RBC # BLD AUTO: 3.62 M/UL (ref 4.6–6.2)
SODIUM SERPL-SCNC: 139 MMOL/L (ref 136–145)
WBC # BLD AUTO: 9.05 K/UL (ref 4.5–11)

## 2024-03-14 PROCEDURE — 82550 ASSAY OF CK (CPK): CPT | Performed by: FAMILY MEDICINE

## 2024-03-14 PROCEDURE — 80048 BASIC METABOLIC PNL TOTAL CA: CPT | Performed by: FAMILY MEDICINE

## 2024-03-14 PROCEDURE — 85025 COMPLETE CBC W/AUTO DIFF WBC: CPT | Performed by: FAMILY MEDICINE

## 2024-03-14 PROCEDURE — 86140 C-REACTIVE PROTEIN: CPT | Performed by: FAMILY MEDICINE

## 2024-03-14 PROCEDURE — 85651 RBC SED RATE NONAUTOMATED: CPT | Performed by: FAMILY MEDICINE

## 2024-03-15 LAB — CRP SERPL-MCNC: 0.94 MG/DL (ref 0–0.8)

## 2024-03-18 ENCOUNTER — LAB REQUISITION (OUTPATIENT)
Dept: LAB | Facility: HOSPITAL | Age: 69
End: 2024-03-18
Attending: FAMILY MEDICINE
Payer: COMMERCIAL

## 2024-03-18 DIAGNOSIS — T87.43 INFECTION OF AMPUTATION STUMP, RIGHT LOWER EXTREMITY: ICD-10-CM

## 2024-03-18 DIAGNOSIS — E11.69 TYPE 2 DIABETES MELLITUS WITH OTHER SPECIFIED COMPLICATION: ICD-10-CM

## 2024-03-18 DIAGNOSIS — M86.171 OTHER ACUTE OSTEOMYELITIS, RIGHT ANKLE AND FOOT: ICD-10-CM

## 2024-03-18 LAB
ANION GAP SERPL CALCULATED.3IONS-SCNC: 12 MMOL/L (ref 7–16)
BASOPHILS # BLD AUTO: 0.06 K/UL (ref 0–0.2)
BASOPHILS NFR BLD AUTO: 0.6 % (ref 0–1)
BUN SERPL-MCNC: 9 MG/DL (ref 7–18)
BUN/CREAT SERPL: 11 (ref 6–20)
CALCIUM SERPL-MCNC: 7.3 MG/DL (ref 8.5–10.1)
CHLORIDE SERPL-SCNC: 109 MMOL/L (ref 98–107)
CK SERPL-CCNC: 67 U/L (ref 39–308)
CO2 SERPL-SCNC: 23 MMOL/L (ref 21–32)
CREAT SERPL-MCNC: 0.83 MG/DL (ref 0.7–1.3)
CRP SERPL-MCNC: 0.5 MG/DL (ref 0–0.8)
DIFFERENTIAL METHOD BLD: ABNORMAL
EGFR (NO RACE VARIABLE) (RUSH/TITUS): 97 ML/MIN/1.73M2
EOSINOPHIL # BLD AUTO: 0.28 K/UL (ref 0–0.5)
EOSINOPHIL NFR BLD AUTO: 2.8 % (ref 1–4)
ERYTHROCYTE [DISTWIDTH] IN BLOOD BY AUTOMATED COUNT: 13.5 % (ref 11.5–14.5)
ERYTHROCYTE [SEDIMENTATION RATE] IN BLOOD BY WESTERGREN METHOD: 33 MM/HR (ref 0–20)
GLUCOSE SERPL-MCNC: 71 MG/DL (ref 74–106)
HCT VFR BLD AUTO: 38 % (ref 40–54)
HGB BLD-MCNC: 12.9 G/DL (ref 13.5–18)
IMM GRANULOCYTES # BLD AUTO: 0.09 K/UL (ref 0–0.04)
IMM GRANULOCYTES NFR BLD: 0.9 % (ref 0–0.4)
LYMPHOCYTES # BLD AUTO: 2.54 K/UL (ref 1–4.8)
LYMPHOCYTES NFR BLD AUTO: 25.6 % (ref 27–41)
MCH RBC QN AUTO: 29.6 PG (ref 27–31)
MCHC RBC AUTO-ENTMCNC: 33.9 G/DL (ref 32–36)
MCV RBC AUTO: 87.2 FL (ref 80–96)
MONOCYTES # BLD AUTO: 0.8 K/UL (ref 0–0.8)
MONOCYTES NFR BLD AUTO: 8.1 % (ref 2–6)
MPC BLD CALC-MCNC: 10.2 FL (ref 9.4–12.4)
NEUTROPHILS # BLD AUTO: 6.14 K/UL (ref 1.8–7.7)
NEUTROPHILS NFR BLD AUTO: 62 % (ref 53–65)
NRBC # BLD AUTO: 0 X10E3/UL
NRBC, AUTO (.00): 0 %
PLATELET # BLD AUTO: 300 K/UL (ref 150–400)
POTASSIUM SERPL-SCNC: 3.2 MMOL/L (ref 3.5–5.1)
RBC # BLD AUTO: 4.36 M/UL (ref 4.6–6.2)
SODIUM SERPL-SCNC: 141 MMOL/L (ref 136–145)
WBC # BLD AUTO: 9.91 K/UL (ref 4.5–11)

## 2024-03-18 PROCEDURE — 85651 RBC SED RATE NONAUTOMATED: CPT | Performed by: FAMILY MEDICINE

## 2024-03-18 PROCEDURE — 82550 ASSAY OF CK (CPK): CPT | Performed by: FAMILY MEDICINE

## 2024-03-18 PROCEDURE — 85025 COMPLETE CBC W/AUTO DIFF WBC: CPT | Performed by: FAMILY MEDICINE

## 2024-03-18 PROCEDURE — 86140 C-REACTIVE PROTEIN: CPT | Performed by: FAMILY MEDICINE

## 2024-03-18 PROCEDURE — 80048 BASIC METABOLIC PNL TOTAL CA: CPT | Performed by: FAMILY MEDICINE

## 2025-07-20 ENCOUNTER — HOSPITAL ENCOUNTER (INPATIENT)
Facility: HOSPITAL | Age: 70
LOS: 4 days | Discharge: HOME | DRG: 871 | End: 2025-07-25
Attending: EMERGENCY MEDICINE | Admitting: INTERNAL MEDICINE
Payer: MEDICARE

## 2025-07-20 DIAGNOSIS — A41.9 SEPSIS: ICD-10-CM

## 2025-07-20 DIAGNOSIS — N17.9 ACUTE KIDNEY INJURY: ICD-10-CM

## 2025-07-20 DIAGNOSIS — K29.70 GASTRITIS WITHOUT BLEEDING, UNSPECIFIED CHRONICITY, UNSPECIFIED GASTRITIS TYPE: ICD-10-CM

## 2025-07-20 DIAGNOSIS — R00.0 TACHYCARDIA: ICD-10-CM

## 2025-07-20 DIAGNOSIS — A41.9 SEPSIS, DUE TO UNSPECIFIED ORGANISM, UNSPECIFIED WHETHER ACUTE ORGAN DYSFUNCTION PRESENT: ICD-10-CM

## 2025-07-20 DIAGNOSIS — R11.2 NAUSEA AND VOMITING, UNSPECIFIED VOMITING TYPE: ICD-10-CM

## 2025-07-20 DIAGNOSIS — R13.19 ESOPHAGEAL DYSPHAGIA: ICD-10-CM

## 2025-07-20 DIAGNOSIS — A41.9 SEPSIS WITH ACUTE RENAL FAILURE AND TUBULAR NECROSIS WITHOUT SEPTIC SHOCK, DUE TO UNSPECIFIED ORGANISM: ICD-10-CM

## 2025-07-20 DIAGNOSIS — S91.301A OPEN WOUND OF RIGHT FOOT: Primary | ICD-10-CM

## 2025-07-20 DIAGNOSIS — I48.91 NEW ONSET A-FIB: ICD-10-CM

## 2025-07-20 DIAGNOSIS — K44.9 HH (HIATUS HERNIA): ICD-10-CM

## 2025-07-20 DIAGNOSIS — K21.00 GASTROESOPHAGEAL REFLUX DISEASE WITH ESOPHAGITIS WITHOUT HEMORRHAGE: ICD-10-CM

## 2025-07-20 DIAGNOSIS — N17.0 SEPSIS WITH ACUTE RENAL FAILURE AND TUBULAR NECROSIS WITHOUT SEPTIC SHOCK, DUE TO UNSPECIFIED ORGANISM: ICD-10-CM

## 2025-07-20 DIAGNOSIS — S91.301D OPEN WOUND OF RIGHT FOOT, SUBSEQUENT ENCOUNTER: ICD-10-CM

## 2025-07-20 DIAGNOSIS — R65.20 SEPSIS WITH ACUTE RENAL FAILURE AND TUBULAR NECROSIS WITHOUT SEPTIC SHOCK, DUE TO UNSPECIFIED ORGANISM: ICD-10-CM

## 2025-07-20 LAB
ALBUMIN SERPL BCP-MCNC: 4.1 G/DL (ref 3.4–4.8)
ALBUMIN/GLOB SERPL: 0.9 {RATIO}
ALP SERPL-CCNC: 131 U/L (ref 40–150)
ALT SERPL W P-5'-P-CCNC: 35 U/L
AMYLASE SERPL-CCNC: 49 U/L (ref 25–125)
ANION GAP SERPL CALCULATED.3IONS-SCNC: 21 MMOL/L (ref 7–16)
AST SERPL W P-5'-P-CCNC: 56 U/L (ref 11–45)
BASOPHILS # BLD AUTO: 0.05 K/UL (ref 0–0.2)
BASOPHILS NFR BLD AUTO: 0.2 % (ref 0–1)
BILIRUB SERPL-MCNC: 1.2 MG/DL
BUN SERPL-MCNC: 39 MG/DL (ref 8–26)
BUN/CREAT SERPL: 19 (ref 6–20)
CALCIUM SERPL-MCNC: 9.7 MG/DL (ref 8.8–10)
CHLORIDE SERPL-SCNC: 94 MMOL/L (ref 98–107)
CO2 SERPL-SCNC: 18 MMOL/L (ref 23–31)
CREAT SERPL-MCNC: 2.06 MG/DL (ref 0.72–1.25)
DIFFERENTIAL METHOD BLD: ABNORMAL
EGFR (NO RACE VARIABLE) (RUSH/TITUS): 35 ML/MIN/1.73M2
EOSINOPHIL # BLD AUTO: 0 K/UL (ref 0–0.5)
EOSINOPHIL NFR BLD AUTO: 0 % (ref 1–4)
ERYTHROCYTE [DISTWIDTH] IN BLOOD BY AUTOMATED COUNT: 12.6 % (ref 11.5–14.5)
GLOBULIN SER-MCNC: 4.7 G/DL (ref 2–4)
GLUCOSE SERPL-MCNC: 443 MG/DL (ref 70–105)
GLUCOSE SERPL-MCNC: 453 MG/DL (ref 82–115)
HCO3 UR-SCNC: 21.6 MMOL/L (ref 24–28)
HCT VFR BLD AUTO: 43.3 % (ref 40–54)
HCT VFR BLD CALC: 52 % (ref 35–51)
HGB BLD-MCNC: 15.2 G/DL (ref 13.5–18)
IMM GRANULOCYTES # BLD AUTO: 0.26 K/UL (ref 0–0.04)
IMM GRANULOCYTES NFR BLD: 1.1 % (ref 0–0.4)
LACTATE SERPL-SCNC: 3.9 MMOL/L (ref 0.5–2.2)
LDH SERPL L TO P-CCNC: 4.3 MMOL/L (ref 0.3–1.2)
LIPASE SERPL-CCNC: 11 U/L
LYMPHOCYTES # BLD AUTO: 1.41 K/UL (ref 1–4.8)
LYMPHOCYTES NFR BLD AUTO: 6.2 % (ref 27–41)
MAGNESIUM SERPL-MCNC: 1.9 MG/DL (ref 1.6–2.6)
MCH RBC QN AUTO: 30.4 PG (ref 27–31)
MCHC RBC AUTO-ENTMCNC: 35.1 G/DL (ref 32–36)
MCV RBC AUTO: 86.6 FL (ref 80–96)
MONOCYTES # BLD AUTO: 1.62 K/UL (ref 0–0.8)
MONOCYTES NFR BLD AUTO: 7.1 % (ref 2–6)
MPC BLD CALC-MCNC: 10.6 FL (ref 9.4–12.4)
NEUTROPHILS # BLD AUTO: 19.32 K/UL (ref 1.8–7.7)
NEUTROPHILS NFR BLD AUTO: 85.4 % (ref 53–65)
NRBC # BLD AUTO: 0 X10E3/UL
NRBC, AUTO (.00): 0 %
PCO2 BLDA: 23 MMHG (ref 41–51)
PH SMN: 7.58 [PH] (ref 7.32–7.42)
PLATELET # BLD AUTO: 305 K/UL (ref 150–400)
PO2 BLDA: 35 MMHG (ref 25–40)
POC BASE EXCESS: 1.8 MMOL/L (ref -2–3)
POC CO2: 22.3 MMOL/L
POC IONIZED CALCIUM: 1.03 MMOL/L (ref 1.15–1.35)
POC SATURATED O2: 79 % (ref 40–70)
POCT GLUCOSE: 459 MG/DL (ref 60–95)
POTASSIUM BLD-SCNC: 4.2 MMOL/L (ref 3.4–4.5)
POTASSIUM SERPL-SCNC: 4.5 MMOL/L (ref 3.5–5.1)
PROT SERPL-MCNC: 8.8 G/DL (ref 5.8–7.6)
RBC # BLD AUTO: 5 M/UL (ref 4.6–6.2)
SODIUM BLD-SCNC: 126 MMOL/L (ref 136–145)
SODIUM SERPL-SCNC: 128 MMOL/L (ref 136–145)
WBC # BLD AUTO: 22.66 K/UL (ref 4.5–11)

## 2025-07-20 PROCEDURE — 83735 ASSAY OF MAGNESIUM: CPT | Performed by: NURSE PRACTITIONER

## 2025-07-20 PROCEDURE — 83605 ASSAY OF LACTIC ACID: CPT

## 2025-07-20 PROCEDURE — 82803 BLOOD GASES ANY COMBINATION: CPT

## 2025-07-20 PROCEDURE — 82962 GLUCOSE BLOOD TEST: CPT

## 2025-07-20 PROCEDURE — 85025 COMPLETE CBC W/AUTO DIFF WBC: CPT | Performed by: NURSE PRACTITIONER

## 2025-07-20 PROCEDURE — 85014 HEMATOCRIT: CPT

## 2025-07-20 PROCEDURE — 80053 COMPREHEN METABOLIC PANEL: CPT | Performed by: NURSE PRACTITIONER

## 2025-07-20 PROCEDURE — 82330 ASSAY OF CALCIUM: CPT

## 2025-07-20 PROCEDURE — 83605 ASSAY OF LACTIC ACID: CPT | Performed by: NURSE PRACTITIONER

## 2025-07-20 PROCEDURE — 84132 ASSAY OF SERUM POTASSIUM: CPT

## 2025-07-20 PROCEDURE — 25000003 PHARM REV CODE 250: Performed by: NURSE PRACTITIONER

## 2025-07-20 PROCEDURE — 83690 ASSAY OF LIPASE: CPT | Performed by: NURSE PRACTITIONER

## 2025-07-20 PROCEDURE — 96361 HYDRATE IV INFUSION ADD-ON: CPT

## 2025-07-20 PROCEDURE — 84295 ASSAY OF SERUM SODIUM: CPT

## 2025-07-20 PROCEDURE — 82947 ASSAY GLUCOSE BLOOD QUANT: CPT

## 2025-07-20 PROCEDURE — 82150 ASSAY OF AMYLASE: CPT | Performed by: NURSE PRACTITIONER

## 2025-07-20 PROCEDURE — 63600175 PHARM REV CODE 636 W HCPCS: Performed by: NURSE PRACTITIONER

## 2025-07-20 PROCEDURE — 96375 TX/PRO/DX INJ NEW DRUG ADDON: CPT

## 2025-07-20 PROCEDURE — 36415 COLL VENOUS BLD VENIPUNCTURE: CPT | Performed by: NURSE PRACTITIONER

## 2025-07-20 RX ORDER — ASPIRIN 81 MG/1
81 TABLET ORAL DAILY
COMMUNITY

## 2025-07-20 RX ORDER — INSULIN GLARGINE-YFGN 100 [IU]/ML
38 INJECTION, SOLUTION SUBCUTANEOUS NIGHTLY
COMMUNITY

## 2025-07-20 RX ORDER — ONDANSETRON HYDROCHLORIDE 2 MG/ML
4 INJECTION, SOLUTION INTRAVENOUS
Status: COMPLETED | OUTPATIENT
Start: 2025-07-20 | End: 2025-07-20

## 2025-07-20 RX ORDER — MORPHINE SULFATE 4 MG/ML
4 INJECTION, SOLUTION INTRAMUSCULAR; INTRAVENOUS
Refills: 0 | Status: COMPLETED | OUTPATIENT
Start: 2025-07-20 | End: 2025-07-20

## 2025-07-20 RX ORDER — LOSARTAN POTASSIUM 50 MG/1
50 TABLET ORAL DAILY
Status: ON HOLD | COMMUNITY
End: 2025-07-25 | Stop reason: HOSPADM

## 2025-07-20 RX ORDER — METFORMIN HYDROCHLORIDE 500 MG/1
500 TABLET ORAL DAILY
COMMUNITY
Start: 2025-01-27 | End: 2025-07-21 | Stop reason: SINTOL

## 2025-07-20 RX ORDER — FAMOTIDINE 20 MG/1
20 TABLET, FILM COATED ORAL 2 TIMES DAILY
Status: ON HOLD | COMMUNITY
End: 2025-07-25 | Stop reason: HOSPADM

## 2025-07-20 RX ORDER — ROSUVASTATIN CALCIUM 5 MG/1
5 TABLET, COATED ORAL NIGHTLY
Status: ON HOLD | COMMUNITY
End: 2025-07-25 | Stop reason: HOSPADM

## 2025-07-20 RX ADMIN — ONDANSETRON 4 MG: 2 INJECTION INTRAMUSCULAR; INTRAVENOUS at 08:07

## 2025-07-20 RX ADMIN — SODIUM CHLORIDE 1000 ML: 9 INJECTION, SOLUTION INTRAVENOUS at 08:07

## 2025-07-20 RX ADMIN — MORPHINE SULFATE 4 MG: 4 INJECTION INTRAVENOUS at 08:07

## 2025-07-20 NOTE — ED NOTES
"Triage stat was called. This RN went to  where patient was found to be on floor. Inquired about patient being on floor with wheelchair behind with wheels locked. Patient states "I sat on the floor because I couldn't sit up in the wheelchair" Patient assisted to wheelchair. Patient sat still in wheelchair and answered all triage questions appropriately.   "

## 2025-07-21 PROBLEM — Z79.4 TYPE 2 DIABETES MELLITUS, WITH LONG-TERM CURRENT USE OF INSULIN: Status: ACTIVE | Noted: 2025-07-21

## 2025-07-21 PROBLEM — E11.9 TYPE 2 DIABETES MELLITUS, WITH LONG-TERM CURRENT USE OF INSULIN: Status: ACTIVE | Noted: 2025-07-21

## 2025-07-21 PROBLEM — I10 BENIGN ESSENTIAL HTN: Status: ACTIVE | Noted: 2025-07-21

## 2025-07-21 PROBLEM — N17.9 AKI (ACUTE KIDNEY INJURY): Status: ACTIVE | Noted: 2025-07-21

## 2025-07-21 PROBLEM — E78.5 HYPERLIPIDEMIA: Status: ACTIVE | Noted: 2025-07-21

## 2025-07-21 PROBLEM — A41.9 SEPSIS WITH ACUTE ORGAN DYSFUNCTION: Status: ACTIVE | Noted: 2025-07-21

## 2025-07-21 PROBLEM — R65.20 SEPSIS WITH ACUTE ORGAN DYSFUNCTION: Status: ACTIVE | Noted: 2025-07-21

## 2025-07-21 PROBLEM — F12.20 CANNABIS DEPENDENCE: Status: ACTIVE | Noted: 2025-07-21

## 2025-07-21 LAB
AMPHET UR QL SCN: NEGATIVE
BARBITURATES UR QL SCN: NEGATIVE
BENZODIAZ METAB UR QL SCN: NEGATIVE
BILIRUB UR QL STRIP: NEGATIVE
CANNABINOIDS UR QL SCN: POSITIVE
CLARITY UR: CLEAR
COCAINE UR QL SCN: NEGATIVE
COLOR UR: COLORLESS
CRP SERPL HS-MCNC: 15.17 MG/L
ERYTHROCYTE [SEDIMENTATION RATE] IN BLOOD BY WESTERGREN METHOD: 10 MM/HR (ref 0–20)
EST. AVERAGE GLUCOSE BLD GHB EST-MCNC: 220 MG/DL
GLUCOSE SERPL-MCNC: 322 MG/DL (ref 70–105)
GLUCOSE SERPL-MCNC: 96 MG/DL (ref 70–105)
GLUCOSE UR STRIP-MCNC: >1000 MG/DL
HBA1C MFR BLD HPLC: 9.3 %
KETONES UR STRIP-SCNC: ABNORMAL MG/DL
LACTATE SERPL-SCNC: 1.5 MMOL/L (ref 0.5–2.2)
LEUKOCYTE ESTERASE UR QL STRIP: NEGATIVE
NITRITE UR QL STRIP: NEGATIVE
OPIATES UR QL SCN: POSITIVE
PCP UR QL SCN: NEGATIVE
PH UR STRIP: 5.5 PH UNITS
PROCALCITONIN SERPL-MCNC: 0.04 NG/ML
PROT UR QL STRIP: 30
RBC # UR STRIP: ABNORMAL /UL
RBC #/AREA URNS HPF: 1 /HPF
SP GR UR STRIP: 1.03
SQUAMOUS #/AREA URNS LPF: ABNORMAL /HPF
UROBILINOGEN UR STRIP-ACNC: NORMAL MG/DL
WBC #/AREA URNS HPF: <1 /HPF

## 2025-07-21 PROCEDURE — 25000003 PHARM REV CODE 250: Performed by: EMERGENCY MEDICINE

## 2025-07-21 PROCEDURE — 81003 URINALYSIS AUTO W/O SCOPE: CPT | Performed by: NURSE PRACTITIONER

## 2025-07-21 PROCEDURE — 11000001 HC ACUTE MED/SURG PRIVATE ROOM

## 2025-07-21 PROCEDURE — 51798 US URINE CAPACITY MEASURE: CPT

## 2025-07-21 PROCEDURE — 82962 GLUCOSE BLOOD TEST: CPT

## 2025-07-21 PROCEDURE — 80307 DRUG TEST PRSMV CHEM ANLYZR: CPT | Performed by: NURSE PRACTITIONER

## 2025-07-21 PROCEDURE — 86141 C-REACTIVE PROTEIN HS: CPT

## 2025-07-21 PROCEDURE — 87040 BLOOD CULTURE FOR BACTERIA: CPT | Performed by: EMERGENCY MEDICINE

## 2025-07-21 PROCEDURE — 83036 HEMOGLOBIN GLYCOSYLATED A1C: CPT

## 2025-07-21 PROCEDURE — 36415 COLL VENOUS BLD VENIPUNCTURE: CPT | Performed by: EMERGENCY MEDICINE

## 2025-07-21 PROCEDURE — 99223 1ST HOSP IP/OBS HIGH 75: CPT | Mod: ,,, | Performed by: INTERNAL MEDICINE

## 2025-07-21 PROCEDURE — 94761 N-INVAS EAR/PLS OXIMETRY MLT: CPT

## 2025-07-21 PROCEDURE — 99900035 HC TECH TIME PER 15 MIN (STAT)

## 2025-07-21 PROCEDURE — 25000003 PHARM REV CODE 250: Performed by: INTERNAL MEDICINE

## 2025-07-21 PROCEDURE — 85651 RBC SED RATE NONAUTOMATED: CPT

## 2025-07-21 PROCEDURE — 94799 UNLISTED PULMONARY SVC/PX: CPT

## 2025-07-21 PROCEDURE — 63600175 PHARM REV CODE 636 W HCPCS: Performed by: EMERGENCY MEDICINE

## 2025-07-21 PROCEDURE — 99291 CRITICAL CARE FIRST HOUR: CPT

## 2025-07-21 PROCEDURE — 63600175 PHARM REV CODE 636 W HCPCS: Performed by: INTERNAL MEDICINE

## 2025-07-21 PROCEDURE — 96361 HYDRATE IV INFUSION ADD-ON: CPT

## 2025-07-21 PROCEDURE — 84145 PROCALCITONIN (PCT): CPT

## 2025-07-21 PROCEDURE — 25000003 PHARM REV CODE 250

## 2025-07-21 PROCEDURE — 96365 THER/PROPH/DIAG IV INF INIT: CPT

## 2025-07-21 PROCEDURE — 87641 MR-STAPH DNA AMP PROBE: CPT

## 2025-07-21 PROCEDURE — 36415 COLL VENOUS BLD VENIPUNCTURE: CPT

## 2025-07-21 PROCEDURE — 63600175 PHARM REV CODE 636 W HCPCS

## 2025-07-21 PROCEDURE — 96376 TX/PRO/DX INJ SAME DRUG ADON: CPT

## 2025-07-21 RX ORDER — FAMOTIDINE 20 MG/1
20 TABLET, FILM COATED ORAL DAILY
Status: DISCONTINUED | OUTPATIENT
Start: 2025-07-21 | End: 2025-07-22

## 2025-07-21 RX ORDER — AMLODIPINE BESYLATE 10 MG/1
10 TABLET ORAL DAILY
Status: DISCONTINUED | OUTPATIENT
Start: 2025-07-21 | End: 2025-07-22

## 2025-07-21 RX ORDER — ONDANSETRON HYDROCHLORIDE 2 MG/ML
4 INJECTION, SOLUTION INTRAVENOUS
Status: COMPLETED | OUTPATIENT
Start: 2025-07-21 | End: 2025-07-21

## 2025-07-21 RX ORDER — HEPARIN SODIUM 5000 [USP'U]/ML
5000 INJECTION, SOLUTION INTRAVENOUS; SUBCUTANEOUS EVERY 8 HOURS
Status: DISCONTINUED | OUTPATIENT
Start: 2025-07-21 | End: 2025-07-22

## 2025-07-21 RX ORDER — IBUPROFEN 200 MG
16 TABLET ORAL
Status: DISCONTINUED | OUTPATIENT
Start: 2025-07-21 | End: 2025-07-25 | Stop reason: HOSPADM

## 2025-07-21 RX ORDER — GLUCAGON 1 MG
1 KIT INJECTION
Status: DISCONTINUED | OUTPATIENT
Start: 2025-07-21 | End: 2025-07-25 | Stop reason: HOSPADM

## 2025-07-21 RX ORDER — SODIUM CHLORIDE 9 MG/ML
INJECTION, SOLUTION INTRAVENOUS CONTINUOUS
Status: DISCONTINUED | OUTPATIENT
Start: 2025-07-21 | End: 2025-07-22

## 2025-07-21 RX ORDER — ACETAMINOPHEN 325 MG/1
650 TABLET ORAL EVERY 4 HOURS PRN
Status: DISCONTINUED | OUTPATIENT
Start: 2025-07-21 | End: 2025-07-25 | Stop reason: HOSPADM

## 2025-07-21 RX ORDER — INSULIN ASPART 100 [IU]/ML
0-10 INJECTION, SOLUTION INTRAVENOUS; SUBCUTANEOUS
Status: DISCONTINUED | OUTPATIENT
Start: 2025-07-21 | End: 2025-07-25 | Stop reason: HOSPADM

## 2025-07-21 RX ORDER — ATORVASTATIN CALCIUM 20 MG/1
20 TABLET, FILM COATED ORAL NIGHTLY
Status: DISCONTINUED | OUTPATIENT
Start: 2025-07-21 | End: 2025-07-25 | Stop reason: HOSPADM

## 2025-07-21 RX ORDER — POLYETHYLENE GLYCOL 3350 17 G/17G
17 POWDER, FOR SOLUTION ORAL DAILY PRN
Status: DISCONTINUED | OUTPATIENT
Start: 2025-07-21 | End: 2025-07-25 | Stop reason: HOSPADM

## 2025-07-21 RX ORDER — ONDANSETRON HYDROCHLORIDE 2 MG/ML
4 INJECTION, SOLUTION INTRAVENOUS EVERY 8 HOURS PRN
Status: DISCONTINUED | OUTPATIENT
Start: 2025-07-21 | End: 2025-07-25 | Stop reason: HOSPADM

## 2025-07-21 RX ORDER — ALUMINUM HYDROXIDE, MAGNESIUM HYDROXIDE, AND SIMETHICONE 2400; 240; 2400 MG/30ML; MG/30ML; MG/30ML
30 SUSPENSION ORAL EVERY 6 HOURS PRN
Status: DISCONTINUED | OUTPATIENT
Start: 2025-07-21 | End: 2025-07-25 | Stop reason: HOSPADM

## 2025-07-21 RX ORDER — ASPIRIN 81 MG/1
81 TABLET ORAL DAILY
Status: DISCONTINUED | OUTPATIENT
Start: 2025-07-21 | End: 2025-07-25 | Stop reason: HOSPADM

## 2025-07-21 RX ORDER — INSULIN GLARGINE 100 [IU]/ML
20 INJECTION, SOLUTION SUBCUTANEOUS NIGHTLY
Status: DISCONTINUED | OUTPATIENT
Start: 2025-07-21 | End: 2025-07-25 | Stop reason: HOSPADM

## 2025-07-21 RX ORDER — TALC
6 POWDER (GRAM) TOPICAL NIGHTLY PRN
Status: DISCONTINUED | OUTPATIENT
Start: 2025-07-21 | End: 2025-07-25 | Stop reason: HOSPADM

## 2025-07-21 RX ORDER — IBUPROFEN 200 MG
24 TABLET ORAL
Status: DISCONTINUED | OUTPATIENT
Start: 2025-07-21 | End: 2025-07-25 | Stop reason: HOSPADM

## 2025-07-21 RX ADMIN — ATORVASTATIN CALCIUM 20 MG: 20 TABLET, FILM COATED ORAL at 08:07

## 2025-07-21 RX ADMIN — ONDANSETRON 4 MG: 2 INJECTION INTRAMUSCULAR; INTRAVENOUS at 05:07

## 2025-07-21 RX ADMIN — HEPARIN SODIUM 5000 UNITS: 5000 INJECTION, SOLUTION INTRAVENOUS; SUBCUTANEOUS at 05:07

## 2025-07-21 RX ADMIN — PIPERACILLIN AND TAZOBACTAM 4.5 G: 4; .5 INJECTION, POWDER, LYOPHILIZED, FOR SOLUTION INTRAVENOUS; PARENTERAL at 05:07

## 2025-07-21 RX ADMIN — VANCOMYCIN HYDROCHLORIDE 1250 MG: 1 INJECTION, POWDER, LYOPHILIZED, FOR SOLUTION INTRAVENOUS at 03:07

## 2025-07-21 RX ADMIN — SODIUM CHLORIDE 1000 ML: 9 INJECTION, SOLUTION INTRAVENOUS at 01:07

## 2025-07-21 RX ADMIN — ASPIRIN 81 MG: 81 TABLET, COATED ORAL at 08:07

## 2025-07-21 RX ADMIN — SODIUM CHLORIDE: 9 INJECTION, SOLUTION INTRAVENOUS at 03:07

## 2025-07-21 RX ADMIN — PIPERACILLIN AND TAZOBACTAM 4.5 G: 4; .5 INJECTION, POWDER, LYOPHILIZED, FOR SOLUTION INTRAVENOUS; PARENTERAL at 09:07

## 2025-07-21 RX ADMIN — HEPARIN SODIUM 5000 UNITS: 5000 INJECTION, SOLUTION INTRAVENOUS; SUBCUTANEOUS at 02:07

## 2025-07-21 RX ADMIN — INSULIN GLARGINE 20 UNITS: 100 INJECTION, SOLUTION SUBCUTANEOUS at 08:07

## 2025-07-21 RX ADMIN — ALUMINUM HYDROXIDE, MAGNESIUM HYDROXIDE, AND DIMETHICONE 30 ML: 400; 400; 40 SUSPENSION ORAL at 08:07

## 2025-07-21 RX ADMIN — ONDANSETRON 4 MG: 2 INJECTION INTRAMUSCULAR; INTRAVENOUS at 01:07

## 2025-07-21 RX ADMIN — INSULIN GLARGINE 20 UNITS: 100 INJECTION, SOLUTION SUBCUTANEOUS at 02:07

## 2025-07-21 RX ADMIN — SODIUM CHLORIDE: 9 INJECTION, SOLUTION INTRAVENOUS at 05:07

## 2025-07-21 RX ADMIN — ATORVASTATIN CALCIUM 20 MG: 20 TABLET, FILM COATED ORAL at 02:07

## 2025-07-21 RX ADMIN — FAMOTIDINE 20 MG: 20 TABLET, FILM COATED ORAL at 08:07

## 2025-07-21 RX ADMIN — PIPERACILLIN SODIUM AND TAZOBACTAM SODIUM 4.5 G: 4; .5 INJECTION, POWDER, LYOPHILIZED, FOR SOLUTION INTRAVENOUS at 12:07

## 2025-07-21 RX ADMIN — HEPARIN SODIUM 5000 UNITS: 5000 INJECTION, SOLUTION INTRAVENOUS; SUBCUTANEOUS at 09:07

## 2025-07-21 RX ADMIN — AMLODIPINE BESYLATE 10 MG: 10 TABLET ORAL at 08:07

## 2025-07-21 NOTE — HPI
The patient is 69 yr old male with past medical hx of Essential HTN, Type 2 DM( is on insulin), HLD, PVD presented to [facility] with complain of abdominal pain, nausea and vomiting since Friday afternoon. The pain was localized in the umbilical region and described as sore rather than severe. The patient had not eaten since the onset of symptoms and experienced vomiting multiple times since Friday. The patient reported experiencing temperature fluctuations, feeling cold one minute and hot the next, accompanied by sweating. Bowel movements were reported as normal without diarrhea or constipation. Denies fever, chest pain, SOB, palpitation.  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    On initial presentation at ED, patient's vital signs were: /76, ,  Respiratory rate 20, O2 saturation 96% , Afebrile    Workup was notable for WBC 22.6, Hemoglobin 15.2, Hematocrit 43.3, Sodium 128, Potassium 4.5, Chloride 94, Creatinine 2.06 ( baseline 0.8), GFR 35, Glucose 453, BUN 39, AST 56, Total Bilirubin 1.2, ALT 35, Amylase 14.9, Lactic Acid 3.9 (improved to 1.5 after 1 liter NS), Magnesium 1.9  Blood culture pending   Urine drug screen positive for cannabinoid and opiates   Urinalysis: trace ketones, blood present, WBC <1    CT abdomen and pelvis: not significant.   X ray Right foot-Pending result    Patient received  2 L IV NS, Zosyn 4.5 gm IV once and Ondansetron 4mg IV once at ED    Patient will be admitted for further evaluation and intervention for management of Sepsis.

## 2025-07-21 NOTE — ASSESSMENT & PLAN NOTE
Patient's FSGs are uncontrolled due to hyperglycemia on current medication regimen.  Last A1c reviewed-   Lab Results   Component Value Date    HGBA1C 9 (H) 01/26/2025     Most recent fingerstick glucose reviewed-   Recent Labs   Lab 07/20/25 2052   POCTGLUCOSE 459*     Current correctional scale  Medium  Maintain anti-hyperglycemic dose as follows-   Antihyperglycemics (From admission, onward)      Start     Stop Route Frequency Ordered    07/21/25 0330  insulin glargine U-100 (Lantus) injection 20 Units         -- SubQ Nightly 07/21/25 0216    07/21/25 0315  insulin aspart U-100 injection 0-10 Units         -- SubQ Before meals & nightly PRN 07/21/25 0216          Hold Oral hypoglycemics while patient is in the hospital.    HBA1C ordered

## 2025-07-21 NOTE — PLAN OF CARE
Problem: Adult Inpatient Plan of Care  Goal: Plan of Care Review  Outcome: Progressing  Goal: Patient-Specific Goal (Individualized)  Outcome: Progressing  Goal: Absence of Hospital-Acquired Illness or Injury  Outcome: Progressing  Goal: Optimal Comfort and Wellbeing  Outcome: Progressing  Goal: Readiness for Transition of Care  Outcome: Progressing     Problem: Sepsis/Septic Shock  Goal: Optimal Coping  Outcome: Progressing  Goal: Absence of Bleeding  Outcome: Progressing  Goal: Blood Glucose Level Within Targeted Range  Outcome: Progressing  Goal: Absence of Infection Signs and Symptoms  Outcome: Progressing  Goal: Optimal Nutrition Intake  Outcome: Progressing     Problem: Diabetes Comorbidity  Goal: Blood Glucose Level Within Targeted Range  Outcome: Progressing     Problem: Acute Kidney Injury/Impairment  Goal: Fluid and Electrolyte Balance  Outcome: Progressing  Goal: Improved Oral Intake  Outcome: Progressing  Goal: Effective Renal Function  Outcome: Progressing

## 2025-07-21 NOTE — ASSESSMENT & PLAN NOTE
Patient has sepsis with Acute kidney injury secondary to Unknown etiology at this time. A review of systems was completed. Patient's sepsis is improving    Current Antibiotics    vancomycin - pharmacy to dose, pharmacy to manage frequency, Intravenous  piperacillin-tazobactam (ZOSYN) 4.5 g in D5W 100 mL IVPB (MB+), Every 8 hours (non-standard times), Intravenous  vancomycin (VANCOCIN) 1,250 mg in 0.9% NaCl 250 mL IVPB, Every 24 hours (non-standard times), Intravenous    Lactate  Recent Labs   Lab 07/20/25 2041 07/20/25 2052 07/20/25  2318   LACTATE 3.9*  --  1.5   POCLAC  --  4.3*  --      Culture Data  Blood Cultures   Culture, Blood   Date Value Ref Range Status   02/01/2024 No Growth at 5 days  Final   02/01/2024 No Growth at 5 days  Final   10/09/2023 No Growth at 5 days  Final   10/09/2023 No Growth at 5 days  Final      Urine Culture   Culture, Urine   Date Value Ref Range Status   02/01/2024 >100,000 Proteus mirabilis (A)  Final      mSOFA  MSOFA Total  Min: 0   Min taken time: 07/20/25 2130  Max: 3   Max taken time: 07/21/25 0230    Plan  - Antibiotics as listed above  - Fluid resuscitation as follows:Actual Body Weight- The patient's actual body weight will be used to calculate the 30 ml/kg fluid bolus.   - Trend lactate to resolution  - Follow up culture data  - Vasopressors were not needed  -Ordered ESR, CRP and Pro calcitonin levels  -Ordered MRSA PCR

## 2025-07-21 NOTE — ED PROVIDER NOTES
Encounter Date: 7/20/2025       History     Chief Complaint   Patient presents with    Vomiting     C/o vomiting and chills that started Friday.     Chills     Patient presents to the ER with complaint of abdominal pain nausea and vomiting.  He reports his symptoms started proximally 4 days ago.  States his symptoms have been worse over the last 24 hours.  Reports episodes of dry heaving.  Patient is diabetic, with HTN and PVD.  Surgical history includes appendectomy, cholecystectomy and partial amputation right foot.  Patient has chronic wound on plantar surface of right foot.  He is concerned he may be septic.    The history is provided by the patient. No  was used.     Review of patient's allergies indicates:   Allergen Reactions    Nubain [nalbuphine]      Past Medical History:   Diagnosis Date    Anemia     Arthritis     Diabetes mellitus, type 2     HLD (hyperlipidemia)     Hypertension     Immune deficiency disorder     Neuropathy     Peripheral vascular disease      Past Surgical History:   Procedure Laterality Date    APPENDECTOMY      L index finger repair from a cat bite during childhood      Surgical debridement of R foot 8/2020       Family History   Problem Relation Name Age of Onset    Diabetes Mother      Hypertension Father       Social History[1]  Review of Systems    Physical Exam     Initial Vitals [07/20/25 1834]   BP Pulse Resp Temp SpO2   118/76 109 20 97.6 °F (36.4 °C) 96 %      MAP       --         Physical Exam    Medical Screening Exam   See Full Note    ED Course   Procedures  Labs Reviewed   COMPREHENSIVE METABOLIC PANEL - Abnormal       Result Value    Sodium 128 (*)     Potassium 4.5      Chloride 94 (*)     CO2 18 (*)     Anion Gap 21 (*)     Glucose 453 (*)     BUN 39 (*)     Creatinine 2.06 (*)     BUN/Creatinine Ratio 19      Calcium 9.7      Total Protein 8.8 (*)     Albumin 4.1      Globulin 4.7 (*)     A/G Ratio 0.9      Bilirubin, Total 1.2      Alk Phos 131       ALT 35      AST 56 (*)     eGFR 35 (*)    URINALYSIS, REFLEX TO URINE CULTURE - Abnormal    Color, UA Colorless      Clarity, UA Clear      pH, UA 5.5      Leukocytes, UA Negative      Nitrites, UA Negative      Protein, UA 30 (*)     Glucose, UA >1000 (*)     Ketones, UA Trace      Urobilinogen, UA Normal      Bilirubin, UA Negative      Blood, UA Moderate (*)     Specific Gravity, UA 1.029     CBC WITH DIFFERENTIAL - Abnormal    WBC 22.66 (*)     RBC 5.00      Hemoglobin 15.2      Hematocrit 43.3      MCV 86.6      MCH 30.4      MCHC 35.1      RDW 12.6      Platelet Count 305      MPV 10.6      Neutrophils % 85.4 (*)     Lymphocytes % 6.2 (*)     Monocytes % 7.1 (*)     Eosinophils % 0.0 (*)     Basophils % 0.2      Immature Granulocytes % 1.1 (*)     nRBC, Auto 0.0      Neutrophils, Abs 19.32 (*)     Lymphocytes, Absolute 1.41      Monocytes, Absolute 1.62 (*)     Eosinophils, Absolute 0.00      Basophils, Absolute 0.05      Immature Granulocytes, Absolute 0.26 (*)     nRBC, Absolute 0.00      Diff Type Auto     LACTIC ACID, PLASMA - Abnormal    Lactic Acid 3.9 (*)    DRUG SCREEN, URINE (BEAKER) - Abnormal    Barbiturates, Urine Negative      Benzodiazepine, Urine Negative      Opiates, Urine Positive (*)     Phencyclidine, Urine Negative      Amphetamine, Urine Negative      Cannabinoid, Urine Positive (*)     Cocaine, Urine Negative      Narrative:     This screen includes the following classes of drugs at the listed cut-off:    Benzodiazepines 200 ng/ml  Cocaine metabolite 300 ng/ml  Opiates 2000 ng/ml  Barbiturates 200 ng/ml  Amphetamines 500 ng/ml  Marijuana metabs (THC) 50 ng/ml  Phencyclidine (PCP) 25 ng/ml    This is a screening test. If results do not correlate with clinical presentation, then a confirmatory send out test is advised.   URINALYSIS, MICROSCOPIC - Abnormal    WBC, UA <1      RBC, UA 1      Squamous Epithelial Cells, UA Occasional (*)    POCT GLUCOSE MONITORING CONTINUOUS - Abnormal     POC Glucose 443 (*)    POCT GLUCOSE MONITORING CONTINUOUS - Abnormal    POC Glucose 322 (*)    MAGNESIUM - Normal    Magnesium 1.9     AMYLASE - Normal    Amylase 49     LIPASE - Normal    Lipase 11     LACTIC ACID, PLASMA - Normal    Lactic Acid 1.5     CULTURE, BLOOD   CULTURE, BLOOD   CBC W/ AUTO DIFFERENTIAL    Narrative:     The following orders were created for panel order CBC auto differential.  Procedure                               Abnormality         Status                     ---------                               -----------         ------                     CBC with Differential[[id number]]       Abnormal            Final result                 Please view results for these tests on the individual orders.   EXTRA TUBES    Narrative:     The following orders were created for panel order EXTRA TUBES.  Procedure                               Abnormality         Status                     ---------                               -----------         ------                     Gold Top Hold[[id number]]                                   In process                   Please view results for these tests on the individual orders.   GOLD TOP HOLD   EXTRA TUBES    Narrative:     The following orders were created for panel order EXTRA TUBES.  Procedure                               Abnormality         Status                     ---------                               -----------         ------                     Light Blue Top Hold[[id number]]                             In process                 Red Top Hold[[id number]]                                    In process                 Light Green Top Hold[[id number]]                            In process                 Lavender Top Hold[[id number]]                               In process                 Gold Top Hold[[id number]]                                   In process                   Please view results for these tests on the individual orders.   LIGHT BLUE  TOP HOLD   RED TOP HOLD   LIGHT GREEN TOP HOLD   LAVENDER TOP HOLD   GOLD TOP HOLD   EXTRA TUBES    Narrative:     The following orders were created for panel order EXTRA TUBES.  Procedure                               Abnormality         Status                     ---------                               -----------         ------                     Light Green Top Hold[[id number]]                            In process                   Please view results for these tests on the individual orders.   LIGHT GREEN TOP HOLD   METHICILLIN-RESISTANT S. AUREUS, PCR   POCT GLUCOSE MONITORING CONTINUOUS          Imaging Results              CT Abdomen Pelvis  Without Contrast (In process)                      X-Ray Foot Complete Right (In process)                      XR ABDOMEN, ACUTE 2 OR MORE VIEWS WITH CHEST (In process)                      Medications   aspirin EC tablet 81 mg (has no administration in time range)   famotidine tablet 20 mg (has no administration in time range)   atorvastatin tablet 20 mg (20 mg Oral Given 7/21/25 0247)   glucose chewable tablet 16 g (has no administration in time range)   glucose chewable tablet 24 g (has no administration in time range)   dextrose 50% injection 12.5 g (has no administration in time range)   dextrose 50% injection 25 g (has no administration in time range)   glucagon (human recombinant) injection 1 mg (has no administration in time range)   heparin (porcine) injection 5,000 Units (has no administration in time range)   vancomycin - pharmacy to dose (has no administration in time range)   acetaminophen tablet 650 mg (has no administration in time range)   polyethylene glycol packet 17 g (has no administration in time range)   ondansetron injection 4 mg (has no administration in time range)   insulin glargine U-100 (Lantus) injection 20 Units (20 Units Subcutaneous Given 7/21/25 0247)   insulin aspart U-100 injection 0-10 Units (has no administration in time range)    melatonin tablet 6 mg (has no administration in time range)   piperacillin-tazobactam (ZOSYN) 4.5 g in D5W 100 mL IVPB (MB+) (has no administration in time range)   0.9% NaCl infusion ( Intravenous New Bag 7/21/25 0314)   vancomycin (VANCOCIN) 1,250 mg in 0.9% NaCl 250 mL IVPB (1,250 mg Intravenous New Bag 7/21/25 0316)   amLODIPine tablet 10 mg (has no administration in time range)   sodium chloride 0.9% bolus 1,000 mL 1,000 mL (0 mLs Intravenous Stopped 7/20/25 2150)   ondansetron injection 4 mg (4 mg Intravenous Given 7/20/25 2051)   morphine injection 4 mg (4 mg Intravenous Given 7/20/25 2050)   piperacillin-tazobactam (ZOSYN) 4.5 g in D5W 100 mL IVPB (MB+) (0 g Intravenous Stopped 7/21/25 0055)   ondansetron injection 4 mg (4 mg Intravenous Given 7/21/25 0107)   sodium chloride 0.9% bolus 1,000 mL 1,000 mL (0 mLs Intravenous Stopped 7/21/25 0208)     Medical Decision Making  Patient presents to the ER with complaint of abdominal pain nausea and vomiting.  He reports his symptoms started proximally 4 days ago.  States his symptoms have been worse over the last 24 hours.  Reports episodes of dry heaving.  Patient is diabetic, with HTN and PVD.  Surgical history includes appendectomy, cholecystectomy and partial amputation right foot.  Patient has chronic wound on plantar surface of right foot.  He is concerned he may be septic.      Amount and/or Complexity of Data Reviewed  Independent Historian: friend  External Data Reviewed: notes.  Labs: ordered.  Radiology: ordered.  Discussion of management or test interpretation with external provider(s): Initiate IV, collect lab work  1 L normal saline bolus  4 mg Zofran IV to treat nausea        Risk  Prescription drug management.  Decision regarding hospitalization.              Attending Attestation:     Physician Attestation Statement for NP/PA:   I personally made/approved the management plan and take responsibility for the patient management.      Attending  Critical Care:   Critical Care Times:   ==============================================================  Total Critical Care Time - exclusive of procedural time: 35 minutes.  ==============================================================  Critical care was necessary to treat or prevent imminent or life-threatening deterioration of the following conditions: sepsis.   Critical care was time spent personally by me on the following activities: obtaining history from patient or relative, ordering lab, x-rays, and/or EKG, development of treatment plan with patient or relative, ordering and performing treatments and interventions, evaluation of patient's response to treatment and discussion with consultants.   Critical Care Condition: critical                                  Clinical Impression:   Final diagnoses:  [S91.301A] Open wound of right foot (Primary)  [R11.2] Nausea and vomiting, unspecified vomiting type  [N17.9] Acute kidney injury  [A41.9] Sepsis, due to unspecified organism, unspecified whether acute organ dysfunction present  [A41.9] Sepsis        ED Disposition Condition    Admit                     [1]   Social History  Tobacco Use    Smoking status: Never    Smokeless tobacco: Never   Substance Use Topics    Alcohol use: Not Currently    Drug use: Never        Trey Villanueva MD  07/21/25 0401

## 2025-07-21 NOTE — PLAN OF CARE
[facility]  Initial Discharge Assessment       Primary Care Provider: Bhavana Llanos MD    Admission Diagnosis: Open wound of right foot [S91.301A]  Acute kidney injury [N17.9]  Sepsis [A41.9]  Sepsis, due to unspecified organism, unspecified whether acute organ dysfunction present [A41.9]  Nausea and vomiting, unspecified vomiting type [R11.2]    Admission Date: 7/20/2025  Expected Discharge Date:     Transition of Care Barriers: None    Payor: [facility] MCARE / Plan: [employer] DUAL COMPLETE HMO SNP / Product Type: Medicare Advantage /     Extended Emergency Contact Information  Primary Emergency Contact: Antonio Colon  Address: [address], [place] [id number] [place] [address]  Home Phone: [phone]  Mobile Phone: [phone]  Relation: Other  Preferred language: English   needed? No    Discharge Plan A: Home  Discharge Plan B: Home, Home Health      [facility] [address] [place] [address]  Phone: [phone] Fax: [phone]      Initial Assessment (most recent)       Adult Discharge Assessment - 07/21/25 1153          Discharge Assessment    Assessment Type Discharge Planning Assessment     Confirmed/corrected address, phone number and insurance Yes     Confirmed Demographics Correct on Facesheet     Source of Information patient     People in Home alone     Do you expect to return to your current living situation? Yes     Do you have help at home or someone to help you manage your care at home? Yes     Who are your caregiver(s) and their phone number(s)? Jluis Wise, friend, [phone]     Prior to hospitilization cognitive status: Unable to Assess     Current cognitive status: Alert/Oriented     Walking or Climbing Stairs Difficulty no     Dressing/Bathing Difficulty no     Home Accessibility wheelchair accessible     Home Layout Able to live on 1st floor      Equipment Currently Used at Home none     Readmission within 30 days? No     Patient currently being followed by outpatient case management? No     Do you currently have service(s) that help you manage your care at home? No     Do you take prescription medications? Yes     Do you have prescription coverage? Yes     Do you have any problems affording any of your prescribed medications? No     Is the patient taking medications as prescribed? yes     Who is going to help you get home at discharge? Jluis Wise, friend, [phone]     How do you get to doctors appointments? car, drives self;family or friend will provide     Are you on dialysis? No     Do you take coumadin? No     Discharge Plan A Home     Discharge Plan B Home;Home Health     DME Needed Upon Discharge  none     Discharge Plan discussed with: Patient     Transition of Care Barriers None        Physical Activity    On average, how many days per week do you engage in moderate to strenuous exercise (like a brisk walk)? 3 days     On average, how many minutes do you engage in exercise at this level? 10 min        Financial Resource Strain    How hard is it for you to pay for the very basics like food, housing, medical care, and heating? Not hard at all        Housing Stability    In the last 12 months, was there a time when you were not able to pay the mortgage or rent on time? No     At any time in the past 12 months, were you homeless or living in a shelter (including now)? No        Transportation Needs    In the past 12 months, has lack of transportation kept you from medical appointments or from getting medications? No     In the past 12 months, has lack of transportation kept you from meetings, work, or from getting things needed for daily living? No        Food Insecurity    Within the past 12 months, you worried that your food would run out before you got the money to buy more. Never true     Within the past 12 months, the food you bought just  didn't last and you didn't have money to get more. Never true        Stress    Do you feel stress - tense, restless, nervous, or anxious, or unable to sleep at night because your mind is troubled all the time - these days? Not at all        Social Isolation    How often do you feel lonely or isolated from those around you?  Never        Alcohol Use    Q1: How often do you have a drink containing alcohol? Never     Q2: How many drinks containing alcohol do you have on a typical day when you are drinking? Patient does not drink     Q3: How often do you have six or more drinks on one occasion? Never        Utilities    In the past 12 months has the electric, gas, oil, or water company threatened to shut off services in your home? No        Health Literacy    How often do you need to have someone help you when you read instructions, pamphlets, or other written material from your doctor or pharmacy? Rarely                   Ss spoke with pt at bedside. Pt lives at home alone but his friend helps him as needed. Pt plans to return to current living arrangements when medically ready. Pt does not currently require dme and is not current with hh. SDOH complete. IM obtained. Ss following

## 2025-07-21 NOTE — PROGRESS NOTES
"Pharmacokinetic Initial Assessment: IV Vancomycin    Assessment/Plan:    Initiate intravenous vancomycin 1250 mg every 24 hours   Desired empiric serum trough concentration is 15 to 20 mcg/mL  Draw vancomycin trough level 30 min prior to fourth dose on 7/24 at approximately 0300  Pharmacy will continue to follow and monitor vancomycin.      Please contact pharmacy at extension [phone] with any questions regarding this assessment.     Thank you for the consult,   Aisha Rasheed       Patient brief summary:  Antonio Colon is a 69 y.o. male initiated on antimicrobial therapy with IV Vancomycin for treatment of suspected sepsis    Drug Allergies:   Review of patient's allergies indicates:   Allergen Reactions    Nubain [nalbuphine]        Actual Body Weight:   68 kg     Renal Function:   Estimated Creatinine Clearance: 33.5 mL/min (A) (based on SCr of 2.06 mg/dL (H)).,     Dialysis Method (if applicable):  N/A    CBC (last 72 hours):  Recent Labs   Lab Result Units 07/20/25 2032   WBC K/uL 22.66*   Hemoglobin g/dL 15.2   Hematocrit % 43.3   Platelet Count K/uL 305   Lymphocytes % % 6.2*   Monocytes % % 7.1*   Eosinophils % % 0.0*   Basophils % % 0.2   Diff Type  Auto       Metabolic Panel (last 72 hours):  Recent Labs   Lab Result Units 07/20/25 2032 07/21/25  0022   Sodium mmol/L 128*  --    Potassium mmol/L 4.5  --    Chloride mmol/L 94*  --    CO2 mmol/L 18*  --    Glucose mg/dL 453*  --    Glucose, UA mg/dL  --  >1000*   BUN mg/dL 39*  --    Creatinine mg/dL 2.06*  --    Albumin g/dL 4.1  --    Bilirubin, Total mg/dL 1.2  --    Alk Phos U/L 131  --    AST U/L 56*  --    ALT U/L 35  --    Magnesium mg/dL 1.9  --        Drug levels (last 3 results):  No results for input(s): "VANCOMYCINRA", "VANCORANDOM", "VANCOMYCINPE", "VANCOPEAK", "VANCOMYCINTR", "VANCOTROUGH" in the last 72 hours.    Microbiologic Results:  Microbiology Results (last 7 days)       Procedure Component Value Units Date/Time    Blood culture #2 " [[id number]] Collected: 07/21/25 0012    Order Status: Sent Specimen: Blood Updated: 07/21/25 0015    Blood culture #1 [[id number]] Collected: 07/21/25 0008    Order Status: Sent Specimen: Blood Updated: 07/21/25 0014

## 2025-07-21 NOTE — PROGRESS NOTES
[facility] - [facility]  Wound Care    Patient Name:  Antonio Colon   MRN:  [id number]  Date: 7/21/2025  Diagnosis: Sepsis with acute organ dysfunction    History:     Past Medical History:   Diagnosis Date    Anemia     Arthritis     Diabetes mellitus, type 2     HLD (hyperlipidemia)     Hypertension     Immune deficiency disorder     Neuropathy     Peripheral vascular disease        Social History[1]    Precautions:     Allergies as of 07/20/2025 - Reviewed 07/20/2025   Allergen Reaction Noted    Nubain [nalbuphine]  10/09/2023       WO Assessment Details/Treatment        07/21/25 1445   WOCN Assessment   WOCN Total Time (mins) 60   Visit Date 07/21/25   Visit Time 1445   Consult Type New   WOCN Speciality Wound   Wound diabetic   Number of Wounds 1   Intervention assessed;applied;chart review;coordination of care   Teaching on-going        Wound 07/21/25 1445 Diabetic Ulcer Right Heel   Date First Assessed/Time First Assessed: 07/21/25 1445   Present on Original Admission: Yes  Primary Wound Type: Diabetic Ulcer  Side: Right  Location: Heel   Wound Image    Dressing Appearance Open to air   Drainage Amount None   Appearance Intact;Pink;Dry   Tissue loss description Full thickness   Black (%), Wound Tissue Color 0 %   Red (%), Wound Tissue Color 100 %   Yellow (%), Wound Tissue Color 0 %   Periwound Area Intact;Dry   Wound Edges Open;Undefined   Wound Length (cm) 3 cm   Wound Width (cm) 2 cm   Wound Depth (cm) 0.2 cm   Wound Volume (cm^3) 0.628 cm^3   Wound Surface Area (cm^2) 4.71 cm^2   Care Cleansed with:;Antimicrobial agent;Applied:;Moisturizing agent   Dressing Applied;Silver;Hydrofiber;Rolled gauze   Periwound Care Moisturizer applied   Dressing Change Due 07/24/25         WOC Team consulted for dorsal right foot    Narrative: pt lying in bed, resp even and nonlabored, NADN. Pt tolerated wound care well.     Active Wounds and Recommendations: Clean with vashe. Apply aquacel Ag,  secure with foam border. Change every 3 days AND AS NEEDED for drainage/soilage.       Goals for Wound Healing: Apply antimicrobial, Reduce pain, Reduce bioburden, and Educate on proper wound management post D/C     Barriers to Wound Healing: poor vascular supply diabetes dry wound bed decreased granulation tissue advanced age fragile skin no protective sensation infection history of poor compliance    Orders placed.    Thank you for the consult.     We will continue to follow. See additional note under Notes Tab for tentative f/u plan/dates.        07/21/2025       [1]   Social History  Socioeconomic History    Marital status: Unknown   Occupational History    Occupation: retired   Tobacco Use    Smoking status: Never    Smokeless tobacco: Never   Substance and Sexual Activity    Alcohol use: Not Currently    Drug use: Never     Social Drivers of Health     Financial Resource Strain: Low Risk  (7/21/2025)    Overall Financial Resource Strain (CARDIA)     Difficulty of Paying Living Expenses: Not hard at all   Food Insecurity: No Food Insecurity (7/21/2025)    Hunger Vital Sign     Worried About Running Out of Food in the Last Year: Never true     Ran Out of Food in the Last Year: Never true   Transportation Needs: No Transportation Needs (7/21/2025)    PRAPARE - Transportation     Lack of Transportation (Medical): No     Lack of Transportation (Non-Medical): No   Physical Activity: Insufficiently Active (7/21/2025)    Exercise Vital Sign     Days of Exercise per Week: 3 days     Minutes of Exercise per Session: 10 min   Stress: No Stress Concern Present (7/21/2025)    [employer] [place] [employer] - Occupational Stress Questionnaire     Feeling of Stress : Not at all   Housing Stability: Low Risk  (7/21/2025)    Housing Stability Vital Sign     Unable to Pay for Housing in the Last Year: No     Number of Times Moved in the Last Year: 0     Homeless in the Last Year: No

## 2025-07-21 NOTE — PHARMACY MED REC
"Admission Medication History     The home medication history was taken by Sapna Nam.    You may go to "Admission" then "Reconcile Home Medications" tabs to review and/or act upon these items.     The home medication list has been updated by the Pharmacy department.   Please read ALL comments highlighted in yellow.   Please address this information as you see fit.    Feel free to contact us if you have any questions or require assistance.      The medications listed below were removed from the home medication list. Please reorder if appropriate:  Tresiba flextouch         Medications listed below were obtained from: Patient/family, Analytic software- [employer], and Medical records  (Not in a hospital admission)        Current Outpatient Medications on File Prior to Encounter   Medication Sig Dispense Refill Last Dose/Taking    aspirin (ECOTRIN) 81 MG EC tablet Take 81 mg by mouth once daily.       famotidine (PEPCID) 20 MG tablet Take 20 mg by mouth 2 (two) times daily.   7/20/2025    losartan (COZAAR) 50 MG tablet Take 50 mg by mouth once daily.   7/20/2025    metFORMIN (GLUCOPHAGE) 500 MG tablet Take 500 mg by mouth once daily.   7/20/2025    rosuvastatin (CRESTOR) 5 MG tablet Take 5 mg by mouth every evening.   7/19/2025    SEMGLEE,INSULIN GLARG-YFGN, unit/mL (3 mL) InPn Inject 38 Units into the skin every evening.   7/19/2025    [DISCONTINUED] BD ANGELINA 2ND GEN PEN NEEDLE 32 gauge x 5/32" Ndle USE 1 ONCE DAILY       [DISCONTINUED] FREESTYLE JESUSITA 2 SENSOR Kit CHANGE EVERY 14 DAYS       [DISCONTINUED] TRESIBA FLEXTOUCH U-100 100 unit/mL (3 mL) insulin pen Inject 35 Units into the skin once daily.          Potential issues to be addressed PRIOR TO DISCHARGE  N/A    Sapna Nam  EXT [id number]                 .          "

## 2025-07-21 NOTE — H&P
[facility] - [address] [place] [address] Medicine  History & Physical    Patient Name: Antonio Colon  MRN: [id number]  Patient Class: IP- Inpatient  Admission Date: 7/20/2025  Attending Physician: Babar Mcknight MD   Primary Care Provider: Bhavana Llanos MD         Patient information was obtained from patient, past medical records, and ER records.     Subjective:     Principal Problem:Sepsis with acute organ dysfunction    Chief Complaint:   Chief Complaint   Patient presents with    Vomiting     C/o vomiting and chills that started Friday.     Chills        HPI: The patient is 69 yr old male with past medical hx of Essential HTN, Type 2 DM( is on insulin), HLD, PVD presented to [facility] with complain of abdominal pain, nausea and vomiting since Friday afternoon. The pain was localized in the umbilical region and described as sore rather than severe. The patient had not eaten since the onset of symptoms and experienced vomiting multiple times since Friday. The patient reported experiencing temperature fluctuations, feeling cold one minute and hot the next, accompanied by sweating. Bowel movements were reported as normal without diarrhea or constipation. Denies fever, chest pain, SOB, palpitation.  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    On initial presentation at ED, patient's vital signs were: /76, ,  Respiratory rate 20, O2 saturation 96% , Afebrile    Workup was notable for WBC 22.6, Hemoglobin 15.2, Hematocrit 43.3, Sodium 128, Potassium 4.5, Chloride 94, Creatinine 2.06 ( baseline 0.8), GFR 35, Glucose 453, BUN 39, AST 56, Total Bilirubin 1.2, ALT 35, Amylase 14.9, Lactic Acid 3.9 (improved to 1.5 after 1 liter NS), Magnesium 1.9  Blood culture pending   Urine drug screen positive for cannabinoid and opiates   Urinalysis: trace ketones, blood present, WBC <1    CT  abdomen and pelvis: not significant.   X ray Right foot-Pending result    Patient received  2 L IV NS, Zosyn 4.5 gm IV once and Ondansetron 4mg IV once at ED    Patient will be admitted for further evaluation and intervention for management of Sepsis.       Past Medical History:   Diagnosis Date    Anemia     Arthritis     Diabetes mellitus, type 2     HLD (hyperlipidemia)     Hypertension     Immune deficiency disorder     Neuropathy     Peripheral vascular disease        Past Surgical History:   Procedure Laterality Date    APPENDECTOMY      L index finger repair from a cat bite during childhood      Surgical debridement of R foot 8/2020         Review of patient's allergies indicates:   Allergen Reactions    Nubain [nalbuphine]        No current facility-administered medications on file prior to encounter.     Current Outpatient Medications on File Prior to Encounter   Medication Sig    famotidine (PEPCID) 20 MG tablet Take 20 mg by mouth 2 (two) times daily.    losartan (COZAAR) 50 MG tablet Take 50 mg by mouth once daily.    rosuvastatin (CRESTOR) 5 MG tablet Take 5 mg by mouth every evening.    SEMGLEE,INSULIN GLARG-YFGN, unit/mL (3 mL) InPn Inject 38 Units into the skin every evening.    [DISCONTINUED] metFORMIN (GLUCOPHAGE) 500 MG tablet Take 500 mg by mouth once daily.    aspirin (ECOTRIN) 81 MG EC tablet Take 81 mg by mouth once daily.     Family History       Problem Relation (Age of Onset)    Diabetes Mother    Hypertension Father          Tobacco Use    Smoking status: Never    Smokeless tobacco: Never   Substance and Sexual Activity    Alcohol use: Not Currently    Drug use: Never    Sexual activity: Not on file     Review of Systems   Constitutional:  Positive for activity change, appetite change and chills. Negative for fever.   Respiratory:  Negative for cough and shortness of breath.    Cardiovascular:  Negative for chest pain, palpitations and leg swelling.   Gastrointestinal:  Positive for  abdominal pain, nausea and vomiting. Negative for constipation and diarrhea.   Genitourinary:  Negative for decreased urine volume, dysuria and frequency.   Musculoskeletal:  Positive for arthralgias.   Neurological:  Positive for weakness. Negative for dizziness and headaches.   Hematological:  Negative for adenopathy.   Psychiatric/Behavioral:  Negative for agitation.      Objective:     Vital Signs (Most Recent):  Temp: 98.3 °F (36.8 °C) (07/21/25 0100)  Pulse: 91 (07/21/25 0100)  Resp: 18 (07/21/25 0100)  BP: 117/71 (07/21/25 0100)  SpO2: 98 % (07/21/25 0100) Vital Signs (24h Range):  Temp:  [97.6 °F (36.4 °C)-98.3 °F (36.8 °C)] 98.3 °F (36.8 °C)  Pulse:  [] 91  Resp:  [17-20] 18  SpO2:  [96 %-98 %] 98 %  BP: (117-124)/(68-76) 117/71     Weight: 68 kg (150 lb)  Body mass index is 21.52 kg/m².     Physical Exam  Vitals reviewed.   Constitutional:       Appearance: Normal appearance.   HENT:      Head: Atraumatic.      Nose: Nose normal.      Mouth/Throat:      Mouth: Mucous membranes are dry.   Eyes:      Extraocular Movements: Extraocular movements intact.      Conjunctiva/sclera: Conjunctivae normal.      Pupils: Pupils are equal, round, and reactive to light.   Cardiovascular:      Rate and Rhythm: Regular rhythm. Tachycardia present.      Pulses: Normal pulses.      Heart sounds: Normal heart sounds.   Pulmonary:      Effort: Pulmonary effort is normal.      Breath sounds: Normal breath sounds.   Abdominal:      General: Bowel sounds are normal.      Palpations: Abdomen is soft.   Musculoskeletal:         General: Deformity (Right foot-Amputated toes) present.      Cervical back: Neck supple.   Skin:     General: Skin is warm.      Capillary Refill: Capillary refill takes less than 2 seconds.      Findings: Lesion (present in planter aspect of right foot-margins are clean with no discharge or signs of infection) present.   Neurological:      Mental Status: He is alert and oriented to person, place, and  time.   Psychiatric:         Mood and Affect: Mood normal.         Behavior: Behavior normal.              CRANIAL NERVES     CN III, IV, VI   Pupils are equal, round, and reactive to light.       Significant Labs: All pertinent labs within the past 24 hours have been reviewed.    Significant Imaging: I have reviewed all pertinent imaging results/findings within the past 24 hours.  Assessment/Plan:     Assessment & Plan  Sepsis with acute organ dysfunction  Patient has sepsis with Acute kidney injury secondary to Unknown etiology at this time. A review of systems was completed. Patient's sepsis is improving    Current Antibiotics    vancomycin - pharmacy to dose, pharmacy to manage frequency, Intravenous  piperacillin-tazobactam (ZOSYN) 4.5 g in D5W 100 mL IVPB (MB+), Every 8 hours (non-standard times), Intravenous  vancomycin (VANCOCIN) 1,250 mg in 0.9% NaCl 250 mL IVPB, Every 24 hours (non-standard times), Intravenous    Lactate  Recent Labs   Lab 07/20/25 2041 07/20/25 2052 07/20/25  2318   LACTATE 3.9*  --  1.5   POCLAC  --  4.3*  --      Culture Data  Blood Cultures   Culture, Blood   Date Value Ref Range Status   02/01/2024 No Growth at 5 days  Final   02/01/2024 No Growth at 5 days  Final   10/09/2023 No Growth at 5 days  Final   10/09/2023 No Growth at 5 days  Final      Urine Culture   Culture, Urine   Date Value Ref Range Status   02/01/2024 >100,000 Proteus mirabilis (A)  Final      mSOFA  MSOFA Total  Min: 0   Min taken time: 07/20/25 2130  Max: 3   Max taken time: 07/21/25 0230    Plan  - Antibiotics as listed above  - Fluid resuscitation as follows:Actual Body Weight- The patient's actual body weight will be used to calculate the 30 ml/kg fluid bolus.   - Trend lactate to resolution  - Follow up culture data  - Vasopressors were not needed  -Ordered ESR, CRP and Pro calcitonin levels  -Ordered MRSA PCR    ROJAS (acute kidney injury)  ROJAS is likely due to acute tubular necrosis caused by sepsis.  Baseline creatinine is 0.8. Most recent creatinine and eGFR are listed below.  Recent Labs     07/20/25 2032   CREATININE 2.06*   EGFRNORACEVR 35*      Plan  - ROJAS is worsening. Will continue current treatment  - Avoid nephrotoxins and renally dose meds for GFR listed above  - Monitor urine output, serial BMP, and adjust therapy as needed  - Continue with IV fluid- 0.9% NS@100 ml/hr  Benign essential HTN  Patient's blood pressure range in the last 24 hours was: BP  Min: 117/71  Max: 187/99.The patient's inpatient anti-hypertensive regimen is listed below:  Current Antihypertensives  Patient in Losartan 50mg Po once daily at home.     Plan  - BP is uncontrolled, will adjust as follows: Considering ROJAS, will hold Losartan. Started on Amlodipine 10mg PO once daily  - Monitor BP/HR  Type 2 diabetes mellitus, with long-term current use of insulin  Patient's FSGs are uncontrolled due to hyperglycemia on current medication regimen.  Last A1c reviewed-   Lab Results   Component Value Date    HGBA1C 9 (H) 01/26/2025     Most recent fingerstick glucose reviewed-   Recent Labs   Lab 07/20/25 2052   POCTGLUCOSE 459*     Current correctional scale  Medium  Maintain anti-hyperglycemic dose as follows-   Antihyperglycemics (From admission, onward)      Start     Stop Route Frequency Ordered    07/21/25 0330  insulin glargine U-100 (Lantus) injection 20 Units         -- SubQ Nightly 07/21/25 0216    07/21/25 0315  insulin aspart U-100 injection 0-10 Units         -- SubQ Before meals & nightly PRN 07/21/25 0216          Hold Oral hypoglycemics while patient is in the hospital.    HBA1C ordered  Hyperlipidemia  Patient is on Crestor 5mg PO once daily  Will change to Atorvastatin 20mg PO Once daily during hospitalization.    Cannabis dependence  Urine analysis positive for Cannabis  Monitor symptoms    VTE Risk Mitigation (From admission, onward)           Ordered     heparin (porcine) injection 5,000 Units  Every 8 hours          07/21/25 0216     IP VTE LOW RISK PATIENT  Once         07/21/25 0216                    SDOH Screening:  The patient was screened for utility difficulties, food insecurity, transport difficulties, housing insecurity, and interpersonal safety and there were no concerns identified this admission.                         Pharmacokinetic Initial Assessment: IV Vancomycin    Assessment/Plan:    Initiate intravenous vancomycin 1250 mg every 24 hours   Desired empiric serum trough concentration is 15 to 20 mcg/mL  Draw vancomycin trough level 30 min prior to fourth dose on 7/24 at approximately 0300  Pharmacy will continue to follow and monitor vancomycin.      Please contact pharmacy at extension [phone] with any questions regarding this assessment.     Thank you for the consult,   Aisha Rasheed       Patient brief summary:  Antonio Colon is a 69 y.o. male initiated on antimicrobial therapy with IV Vancomycin for treatment of suspected sepsis    Drug Allergies:   Review of patient's allergies indicates:   Allergen Reactions    Nubain [nalbuphine]        Actual Body Weight:   68 kg     Renal Function:   Estimated Creatinine Clearance: 33.5 mL/min (A) (based on SCr of 2.06 mg/dL (H)).,     Dialysis Method (if applicable):  N/A    CBC (last 72 hours):  Recent Labs   Lab Result Units 07/20/25 2032   WBC K/uL 22.66*   Hemoglobin g/dL 15.2   Hematocrit % 43.3   Platelet Count K/uL 305   Lymphocytes % % 6.2*   Monocytes % % 7.1*   Eosinophils % % 0.0*   Basophils % % 0.2   Diff Type  Auto       Metabolic Panel (last 72 hours):  Recent Labs   Lab Result Units 07/20/25 2032 07/21/25  0022   Sodium mmol/L 128*  --    Potassium mmol/L 4.5  --    Chloride mmol/L 94*  --    CO2 mmol/L 18*  --    Glucose mg/dL 453*  --    Glucose, UA mg/dL  --  >1000*   BUN mg/dL 39*  --    Creatinine mg/dL 2.06*  --    Albumin g/dL 4.1  --    Bilirubin, Total mg/dL 1.2  --    Alk Phos U/L 131  --    AST U/L 56*  --    ALT U/L 35  --    Magnesium  "mg/dL 1.9  --        Drug levels (last 3 results):  No results for input(s): "VANCOMYCINRA", "VANCORANDOM", "VANCOMYCINPE", "VANCOPEAK", "VANCOMYCINTR", "VANCOTROUGH" in the last 72 hours.    Microbiologic Results:  Microbiology Results (last 7 days)       Procedure Component Value Units Date/Time    Blood culture #2 [[id number]] Collected: 07/21/25 0012    Order Status: Sent Specimen: Blood Updated: 07/21/25 0015    Blood culture #1 [[id number]] Collected: 07/21/25 0008    Order Status: Sent Specimen: Blood Updated: 07/21/25 0014              Verónica Barrow MD  Department of Hospital Medicine  [facility] - [facility] Tele[facility]          "

## 2025-07-21 NOTE — ASSESSMENT & PLAN NOTE
ROJAS is likely due to acute tubular necrosis caused by sepsis. Baseline creatinine is 0.8. Most recent creatinine and eGFR are listed below.  Recent Labs     07/20/25 2032   CREATININE 2.06*   EGFRNORACEVR 35*      Plan  - ROJAS is worsening. Will continue current treatment  - Avoid nephrotoxins and renally dose meds for GFR listed above  - Monitor urine output, serial BMP, and adjust therapy as needed  - Continue with IV fluid- 0.9% NS@100 ml/hr

## 2025-07-21 NOTE — SUBJECTIVE & OBJECTIVE
Past Medical History:   Diagnosis Date    Anemia     Arthritis     Diabetes mellitus, type 2     HLD (hyperlipidemia)     Hypertension     Immune deficiency disorder     Neuropathy     Peripheral vascular disease        Past Surgical History:   Procedure Laterality Date    APPENDECTOMY      L index finger repair from a cat bite during childhood      Surgical debridement of R foot 8/2020         Review of patient's allergies indicates:   Allergen Reactions    Nubain [nalbuphine]        No current facility-administered medications on file prior to encounter.     Current Outpatient Medications on File Prior to Encounter   Medication Sig    famotidine (PEPCID) 20 MG tablet Take 20 mg by mouth 2 (two) times daily.    losartan (COZAAR) 50 MG tablet Take 50 mg by mouth once daily.    rosuvastatin (CRESTOR) 5 MG tablet Take 5 mg by mouth every evening.    SEMGLEE,INSULIN GLARG-YFGN, unit/mL (3 mL) InPn Inject 38 Units into the skin every evening.    [DISCONTINUED] metFORMIN (GLUCOPHAGE) 500 MG tablet Take 500 mg by mouth once daily.    aspirin (ECOTRIN) 81 MG EC tablet Take 81 mg by mouth once daily.     Family History       Problem Relation (Age of Onset)    Diabetes Mother    Hypertension Father          Tobacco Use    Smoking status: Never    Smokeless tobacco: Never   Substance and Sexual Activity    Alcohol use: Not Currently    Drug use: Never    Sexual activity: Not on file     Review of Systems   Constitutional:  Positive for activity change, appetite change and chills. Negative for fever.   Respiratory:  Negative for cough and shortness of breath.    Cardiovascular:  Negative for chest pain, palpitations and leg swelling.   Gastrointestinal:  Positive for abdominal pain, nausea and vomiting. Negative for constipation and diarrhea.   Genitourinary:  Negative for decreased urine volume, dysuria and frequency.   Musculoskeletal:  Positive for arthralgias.   Neurological:  Positive for weakness. Negative for  dizziness and headaches.   Hematological:  Negative for adenopathy.   Psychiatric/Behavioral:  Negative for agitation.      Objective:     Vital Signs (Most Recent):  Temp: 98.3 °F (36.8 °C) (07/21/25 0100)  Pulse: 91 (07/21/25 0100)  Resp: 18 (07/21/25 0100)  BP: 117/71 (07/21/25 0100)  SpO2: 98 % (07/21/25 0100) Vital Signs (24h Range):  Temp:  [97.6 °F (36.4 °C)-98.3 °F (36.8 °C)] 98.3 °F (36.8 °C)  Pulse:  [] 91  Resp:  [17-20] 18  SpO2:  [96 %-98 %] 98 %  BP: (117-124)/(68-76) 117/71     Weight: 68 kg (150 lb)  Body mass index is 21.52 kg/m².     Physical Exam  Vitals reviewed.   Constitutional:       Appearance: Normal appearance.   HENT:      Head: Atraumatic.      Nose: Nose normal.      Mouth/Throat:      Mouth: Mucous membranes are dry.   Eyes:      Extraocular Movements: Extraocular movements intact.      Conjunctiva/sclera: Conjunctivae normal.      Pupils: Pupils are equal, round, and reactive to light.   Cardiovascular:      Rate and Rhythm: Regular rhythm. Tachycardia present.      Pulses: Normal pulses.      Heart sounds: Normal heart sounds.   Pulmonary:      Effort: Pulmonary effort is normal.      Breath sounds: Normal breath sounds.   Abdominal:      General: Bowel sounds are normal.      Palpations: Abdomen is soft.   Musculoskeletal:         General: Deformity (Right foot-Amputated toes) present.      Cervical back: Neck supple.   Skin:     General: Skin is warm.      Capillary Refill: Capillary refill takes less than 2 seconds.      Findings: Lesion (present in planter aspect of right foot-margins are clean with no discharge or signs of infection) present.   Neurological:      Mental Status: He is alert and oriented to person, place, and time.   Psychiatric:         Mood and Affect: Mood normal.         Behavior: Behavior normal.              CRANIAL NERVES     CN III, IV, VI   Pupils are equal, round, and reactive to light.       Significant Labs: All pertinent labs within the past 24  hours have been reviewed.    Significant Imaging: I have reviewed all pertinent imaging results/findings within the past 24 hours.

## 2025-07-21 NOTE — ASSESSMENT & PLAN NOTE
Patient's blood pressure range in the last 24 hours was: BP  Min: 117/71  Max: 187/99.The patient's inpatient anti-hypertensive regimen is listed below:  Current Antihypertensives  Patient in Losartan 50mg Po once daily at home.     Plan  - BP is uncontrolled, will adjust as follows: Considering ROJAS, will hold Losartan. Started on Amlodipine 10mg PO once daily  - Monitor BP/HR

## 2025-07-21 NOTE — ASSESSMENT & PLAN NOTE
Patient is on Crestor 5mg PO once daily  Will change to Atorvastatin 20mg PO Once daily during hospitalization.

## 2025-07-22 PROBLEM — I50.9 NEW ONSET OF CONGESTIVE HEART FAILURE: Status: ACTIVE | Noted: 2025-07-22

## 2025-07-22 PROBLEM — R11.2 NAUSEA AND VOMITING: Status: ACTIVE | Noted: 2025-07-22

## 2025-07-22 PROBLEM — I48.91 NEW ONSET A-FIB: Status: ACTIVE | Noted: 2025-07-22

## 2025-07-22 PROBLEM — R13.19 ESOPHAGEAL DYSPHAGIA: Status: ACTIVE | Noted: 2025-07-22

## 2025-07-22 LAB
ALBUMIN SERPL BCP-MCNC: 3.4 G/DL (ref 3.4–4.8)
ALBUMIN/GLOB SERPL: 1 {RATIO}
ALP SERPL-CCNC: 115 U/L (ref 40–150)
ALT SERPL W P-5'-P-CCNC: 33 U/L
ANION GAP SERPL CALCULATED.3IONS-SCNC: 10 MMOL/L (ref 7–16)
AORTIC ROOT ANNULUS: 3.4 CM
AORTIC VALVE CUSP SEPERATION: 2.05 CM
AST SERPL W P-5'-P-CCNC: 48 U/L (ref 11–45)
AV INDEX (PROSTH): 0.67
AV MEAN GRADIENT: 3 MMHG
AV PEAK GRADIENT: 4 MMHG
AV VALVE AREA BY VELOCITY RATIO: 2.4 CM²
AV VALVE AREA: 2.3 CM²
AV VELOCITY RATIO: 0.7
BASOPHILS # BLD AUTO: 0.06 K/UL (ref 0–0.2)
BASOPHILS NFR BLD AUTO: 0.5 % (ref 0–1)
BILIRUB SERPL-MCNC: 1.1 MG/DL
BSA FOR ECHO PROCEDURE: 2.01 M2
BUN SERPL-MCNC: 19 MG/DL (ref 8–26)
BUN/CREAT SERPL: 20 (ref 6–20)
CALCIUM SERPL-MCNC: 8.3 MG/DL (ref 8.8–10)
CHLORIDE SERPL-SCNC: 106 MMOL/L (ref 98–107)
CO2 SERPL-SCNC: 24 MMOL/L (ref 23–31)
CREAT SERPL-MCNC: 0.94 MG/DL (ref 0.72–1.25)
CV ECHO LV RWT: 0.69 CM
DIFFERENTIAL METHOD BLD: ABNORMAL
DOP CALC AO PEAK VEL: 1 M/S
DOP CALC AO VTI: 17.8 CM
DOP CALC LVOT AREA: 3.5 CM2
DOP CALC LVOT DIAMETER: 2.1 CM
DOP CALC LVOT PEAK VEL: 0.7 M/S
DOP CALC LVOT STROKE VOLUME: 41.2 CM3
DOP CALCLVOT PEAK VEL VTI: 11.9 CM
E WAVE DECELERATION TIME: 278 MSEC
E/A RATIO: 1.3
E/E' RATIO: 5 M/S
ECHO LV POSTERIOR WALL: 1.2 CM (ref 0.6–1.1)
EGFR (NO RACE VARIABLE) (RUSH/TITUS): 89 ML/MIN/1.73M2
EOSINOPHIL # BLD AUTO: 0.09 K/UL (ref 0–0.5)
EOSINOPHIL NFR BLD AUTO: 0.7 % (ref 1–4)
ERYTHROCYTE [DISTWIDTH] IN BLOOD BY AUTOMATED COUNT: 12.5 % (ref 11.5–14.5)
FRACTIONAL SHORTENING: 22.9 % (ref 28–44)
GLOBULIN SER-MCNC: 3.5 G/DL (ref 2–4)
GLUCOSE SERPL-MCNC: 149 MG/DL (ref 70–105)
GLUCOSE SERPL-MCNC: 153 MG/DL (ref 70–105)
GLUCOSE SERPL-MCNC: 250 MG/DL (ref 70–105)
GLUCOSE SERPL-MCNC: 69 MG/DL (ref 82–115)
GLUCOSE SERPL-MCNC: 76 MG/DL (ref 70–105)
HCT VFR BLD AUTO: 39.2 % (ref 40–54)
HGB BLD-MCNC: 13.1 G/DL (ref 13.5–18)
IMM GRANULOCYTES # BLD AUTO: 0.06 K/UL (ref 0–0.04)
IMM GRANULOCYTES NFR BLD: 0.5 % (ref 0–0.4)
INTERVENTRICULAR SEPTUM: 1 CM (ref 0.6–1.1)
IVC DIAMETER: 1.86 CM
LEFT ATRIUM AREA SYSTOLIC (APICAL 2 CHAMBER): 14.91 CM2
LEFT ATRIUM AREA SYSTOLIC (APICAL 4 CHAMBER): 12.27 CM2
LEFT ATRIUM VOLUME INDEX MOD: 17 ML/M2
LEFT ATRIUM VOLUME MOD: 33 ML
LEFT INTERNAL DIMENSION IN SYSTOLE: 2.7 CM (ref 2.1–4)
LEFT VENTRICLE DIASTOLIC VOLUME INDEX: 25 ML/M2
LEFT VENTRICLE DIASTOLIC VOLUME: 50 ML
LEFT VENTRICLE END SYSTOLIC VOLUME APICAL 2 CHAMBER: 39.95 ML
LEFT VENTRICLE END SYSTOLIC VOLUME APICAL 4 CHAMBER: 22.76 ML
LEFT VENTRICLE MASS INDEX: 59.5 G/M2
LEFT VENTRICLE SYSTOLIC VOLUME INDEX: 13.5 ML/M2
LEFT VENTRICLE SYSTOLIC VOLUME: 27 ML
LEFT VENTRICULAR INTERNAL DIMENSION IN DIASTOLE: 3.5 CM (ref 3.5–6)
LEFT VENTRICULAR MASS: 119 G
LV LATERAL E/E' RATIO: 5.2 M/S
LV SEPTAL E/E' RATIO: 5.7 M/S
LVED V (TEICH): 50.05 ML
LVES V (TEICH): 26.76 ML
LVOT MG: 1.07 MMHG
LVOT MV: 0.47 CM/S
LYMPHOCYTES # BLD AUTO: 2.31 K/UL (ref 1–4.8)
LYMPHOCYTES NFR BLD AUTO: 18.5 % (ref 27–41)
MAGNESIUM SERPL-MCNC: 2 MG/DL (ref 1.6–2.6)
MCH RBC QN AUTO: 30.2 PG (ref 27–31)
MCHC RBC AUTO-ENTMCNC: 33.4 G/DL (ref 32–36)
MCV RBC AUTO: 90.3 FL (ref 80–96)
METHICILLIN RESISTANT STAPHYLOCOCCUS AUREUS: NEGATIVE
MONOCYTES # BLD AUTO: 1.36 K/UL (ref 0–0.8)
MONOCYTES NFR BLD AUTO: 10.9 % (ref 2–6)
MPC BLD CALC-MCNC: 10.4 FL (ref 9.4–12.4)
MV PEAK A VEL: 0.44 M/S
MV PEAK E VEL: 0.57 M/S
MV STENOSIS PRESSURE HALF TIME: 80.53 MS
MV VALVE AREA P 1/2 METHOD: 2.73 CM2
NEUTROPHILS # BLD AUTO: 8.64 K/UL (ref 1.8–7.7)
NEUTROPHILS NFR BLD AUTO: 68.9 % (ref 53–65)
NRBC # BLD AUTO: 0 X10E3/UL
NRBC, AUTO (.00): 0 %
OHS CV CPX PATIENT HEIGHT IN: 70
OHS CV RV/LV RATIO: 0.94 CM
PHOSPHATE SERPL-MCNC: 2 MG/DL (ref 2.3–4.7)
PISA TR MAX VEL: 1.5 M/S
PLATELET # BLD AUTO: 251 K/UL (ref 150–400)
POTASSIUM SERPL-SCNC: 3.4 MMOL/L (ref 3.5–5.1)
PROT SERPL-MCNC: 6.9 G/DL (ref 5.8–7.6)
PV PEAK GRADIENT: 3 MMHG
PV PEAK VELOCITY: 0.85 M/S
RA PRESSURE ESTIMATED: 3 MMHG
RA VOL SYS: 35.87 ML
RBC # BLD AUTO: 4.34 M/UL (ref 4.6–6.2)
RIGHT ATRIAL AREA: 13.7 CM2
RIGHT ATRIUM END SYSTOLIC VOLUME APICAL 4 CHAMBER INDEX BSA: 16.4 ML/M2
RIGHT ATRIUM VOLUME AREA LENGTH APICAL 4 CHAMBER: 32.8 ML
RIGHT VENTRICLE DIASTOLIC BASEL DIMENSION: 3.3 CM
RIGHT VENTRICLE DIASTOLIC MID DIMENSION: 2.4 CM
RV MID DIAMA: 2.39 CM
RV TB RVSP: 5 MMHG
SODIUM SERPL-SCNC: 137 MMOL/L (ref 136–145)
TDI LATERAL: 0.11 M/S
TDI SEPTAL: 0.1 M/S
TDI: 0.11 M/S
TR MAX PG: 9 MMHG
TRICUSPID ANNULAR PLANE SYSTOLIC EXCURSION: 1.4 CM
TROPONIN I SERPL HS-MCNC: 4.5 NG/L
TROPONIN I SERPL HS-MCNC: 4.9 NG/L
TSH SERPL DL<=0.005 MIU/L-ACNC: 0.78 UIU/ML (ref 0.35–4.94)
TV REST PULMONARY ARTERY PRESSURE: 12 MMHG
WBC # BLD AUTO: 12.52 K/UL (ref 4.5–11)
Z-SCORE OF LEFT VENTRICULAR DIMENSION IN END DIASTOLE: -5.1
Z-SCORE OF LEFT VENTRICULAR DIMENSION IN END SYSTOLE: -2.23

## 2025-07-22 PROCEDURE — 99223 1ST HOSP IP/OBS HIGH 75: CPT | Mod: GT,,, | Performed by: HOSPITALIST

## 2025-07-22 PROCEDURE — 84100 ASSAY OF PHOSPHORUS: CPT

## 2025-07-22 PROCEDURE — 63600175 PHARM REV CODE 636 W HCPCS: Performed by: NURSE PRACTITIONER

## 2025-07-22 PROCEDURE — 85025 COMPLETE CBC W/AUTO DIFF WBC: CPT

## 2025-07-22 PROCEDURE — 25000003 PHARM REV CODE 250: Performed by: NURSE PRACTITIONER

## 2025-07-22 PROCEDURE — 82962 GLUCOSE BLOOD TEST: CPT

## 2025-07-22 PROCEDURE — 84484 ASSAY OF TROPONIN QUANT: CPT

## 2025-07-22 PROCEDURE — 84484 ASSAY OF TROPONIN QUANT: CPT | Performed by: NURSE PRACTITIONER

## 2025-07-22 PROCEDURE — 25000003 PHARM REV CODE 250

## 2025-07-22 PROCEDURE — 93010 ELECTROCARDIOGRAM REPORT: CPT | Mod: ,,, | Performed by: INTERNAL MEDICINE

## 2025-07-22 PROCEDURE — 80053 COMPREHEN METABOLIC PANEL: CPT

## 2025-07-22 PROCEDURE — 99233 SBSQ HOSP IP/OBS HIGH 50: CPT | Mod: ,,,

## 2025-07-22 PROCEDURE — 63600175 PHARM REV CODE 636 W HCPCS

## 2025-07-22 PROCEDURE — 36415 COLL VENOUS BLD VENIPUNCTURE: CPT | Performed by: NURSE PRACTITIONER

## 2025-07-22 PROCEDURE — 99233 SBSQ HOSP IP/OBS HIGH 50: CPT | Mod: ,,, | Performed by: INTERNAL MEDICINE

## 2025-07-22 PROCEDURE — 25000003 PHARM REV CODE 250: Performed by: INTERNAL MEDICINE

## 2025-07-22 PROCEDURE — 11000001 HC ACUTE MED/SURG PRIVATE ROOM

## 2025-07-22 PROCEDURE — 63600175 PHARM REV CODE 636 W HCPCS: Performed by: INTERNAL MEDICINE

## 2025-07-22 PROCEDURE — 36415 COLL VENOUS BLD VENIPUNCTURE: CPT

## 2025-07-22 PROCEDURE — 83735 ASSAY OF MAGNESIUM: CPT

## 2025-07-22 PROCEDURE — 93005 ELECTROCARDIOGRAM TRACING: CPT

## 2025-07-22 PROCEDURE — 84443 ASSAY THYROID STIM HORMONE: CPT

## 2025-07-22 RX ORDER — METOPROLOL TARTRATE 25 MG/1
25 TABLET, FILM COATED ORAL 2 TIMES DAILY
Status: DISCONTINUED | OUTPATIENT
Start: 2025-07-22 | End: 2025-07-22

## 2025-07-22 RX ORDER — ENOXAPARIN SODIUM 100 MG/ML
1 INJECTION SUBCUTANEOUS EVERY 12 HOURS
Status: DISCONTINUED | OUTPATIENT
Start: 2025-07-22 | End: 2025-07-24

## 2025-07-22 RX ORDER — LOSARTAN POTASSIUM 50 MG/1
50 TABLET ORAL DAILY
Status: DISCONTINUED | OUTPATIENT
Start: 2025-07-23 | End: 2025-07-25 | Stop reason: HOSPADM

## 2025-07-22 RX ORDER — METOPROLOL SUCCINATE 50 MG/1
50 TABLET, EXTENDED RELEASE ORAL DAILY
Status: DISCONTINUED | OUTPATIENT
Start: 2025-07-22 | End: 2025-07-25

## 2025-07-22 RX ORDER — FAMOTIDINE 20 MG/1
20 TABLET, FILM COATED ORAL 2 TIMES DAILY
Status: DISCONTINUED | OUTPATIENT
Start: 2025-07-22 | End: 2025-07-23

## 2025-07-22 RX ORDER — METOPROLOL TARTRATE 1 MG/ML
10 INJECTION, SOLUTION INTRAVENOUS ONCE
Status: COMPLETED | OUTPATIENT
Start: 2025-07-22 | End: 2025-07-22

## 2025-07-22 RX ORDER — PROCHLORPERAZINE EDISYLATE 5 MG/ML
10 INJECTION INTRAMUSCULAR; INTRAVENOUS EVERY 6 HOURS PRN
Status: DISCONTINUED | OUTPATIENT
Start: 2025-07-22 | End: 2025-07-25 | Stop reason: HOSPADM

## 2025-07-22 RX ADMIN — INSULIN ASPART 2 UNITS: 100 INJECTION, SOLUTION INTRAVENOUS; SUBCUTANEOUS at 09:07

## 2025-07-22 RX ADMIN — HEPARIN SODIUM 5000 UNITS: 5000 INJECTION, SOLUTION INTRAVENOUS; SUBCUTANEOUS at 07:07

## 2025-07-22 RX ADMIN — PROCHLORPERAZINE EDISYLATE 10 MG: 5 INJECTION INTRAMUSCULAR; INTRAVENOUS at 12:07

## 2025-07-22 RX ADMIN — METOPROLOL SUCCINATE 50 MG: 50 TABLET, EXTENDED RELEASE ORAL at 02:07

## 2025-07-22 RX ADMIN — ENOXAPARIN SODIUM 80 MG: 80 INJECTION SUBCUTANEOUS at 12:07

## 2025-07-22 RX ADMIN — PIPERACILLIN AND TAZOBACTAM 4.5 G: 4; .5 INJECTION, POWDER, LYOPHILIZED, FOR SOLUTION INTRAVENOUS; PARENTERAL at 05:07

## 2025-07-22 RX ADMIN — INSULIN ASPART 2 UNITS: 100 INJECTION, SOLUTION INTRAVENOUS; SUBCUTANEOUS at 05:07

## 2025-07-22 RX ADMIN — FAMOTIDINE 20 MG: 20 TABLET, FILM COATED ORAL at 09:07

## 2025-07-22 RX ADMIN — PIPERACILLIN AND TAZOBACTAM 4.5 G: 4; .5 INJECTION, POWDER, LYOPHILIZED, FOR SOLUTION INTRAVENOUS; PARENTERAL at 03:07

## 2025-07-22 RX ADMIN — VANCOMYCIN HYDROCHLORIDE 1250 MG: 1 INJECTION, POWDER, LYOPHILIZED, FOR SOLUTION INTRAVENOUS at 03:07

## 2025-07-22 RX ADMIN — PIPERACILLIN AND TAZOBACTAM 4.5 G: 4; .5 INJECTION, POWDER, LYOPHILIZED, FOR SOLUTION INTRAVENOUS; PARENTERAL at 10:07

## 2025-07-22 RX ADMIN — METOPROLOL TARTRATE 25 MG: 25 TABLET, FILM COATED ORAL at 12:07

## 2025-07-22 RX ADMIN — ENOXAPARIN SODIUM 80 MG: 80 INJECTION SUBCUTANEOUS at 09:07

## 2025-07-22 RX ADMIN — ASPIRIN 81 MG: 81 TABLET, COATED ORAL at 09:07

## 2025-07-22 RX ADMIN — ATORVASTATIN CALCIUM 20 MG: 20 TABLET, FILM COATED ORAL at 09:07

## 2025-07-22 RX ADMIN — METOPROLOL TARTRATE 10 MG: 1 INJECTION, SOLUTION INTRAVENOUS at 09:07

## 2025-07-22 RX ADMIN — ALUMINUM HYDROXIDE, MAGNESIUM HYDROXIDE, AND DIMETHICONE 30 ML: 400; 400; 40 SUSPENSION ORAL at 08:07

## 2025-07-22 RX ADMIN — SODIUM CHLORIDE: 9 INJECTION, SOLUTION INTRAVENOUS at 10:07

## 2025-07-22 RX ADMIN — AMLODIPINE BESYLATE 10 MG: 10 TABLET ORAL at 09:07

## 2025-07-22 NOTE — PROGRESS NOTES
[facility] - [address] [place] [address]  Wound Care    Patient Name:  Antonio Colon   MRN:  [id number]  Date: 7/22/2025  Diagnosis: Sepsis with acute organ dysfunction    History:     Past Medical History:   Diagnosis Date    Anemia     Arthritis     Diabetes mellitus, type 2     HLD (hyperlipidemia)     Hypertension     Immune deficiency disorder     Neuropathy     Peripheral vascular disease        Social History[1]    Precautions:     Allergies as of 07/20/2025 - Reviewed 07/20/2025   Allergen Reaction Noted    Nubain [nalbuphine]  10/09/2023       WO Assessment Details/Treatment     Narrative: Seen patient for initial preventative skin care measures.  Ulceration to right plantar foot.  Wound consult applied.    Consult wound care of any other findings.      07/22/2025        [1]   Social History  Socioeconomic History    Marital status: Unknown   Occupational History    Occupation: retired   Tobacco Use    Smoking status: Never    Smokeless tobacco: Never   Substance and Sexual Activity    Alcohol use: Not Currently    Drug use: Never     Social Drivers of Health     Financial Resource Strain: Low Risk  (7/21/2025)    Overall Financial Resource Strain (CARDIA)     Difficulty of Paying Living Expenses: Not hard at all   Food Insecurity: No Food Insecurity (7/21/2025)    Hunger Vital Sign     Worried About Running Out of Food in the Last Year: Never true     Ran Out of Food in the Last Year: Never true   Transportation Needs: No Transportation Needs (7/21/2025)    PRAPARE - Transportation     Lack of Transportation (Medical): No     Lack of Transportation (Non-Medical): No   Physical Activity: Insufficiently Active (7/21/2025)    Exercise Vital Sign     Days of Exercise per Week: 3 days     Minutes of Exercise per Session: 10 min   Stress: No Stress Concern Present (7/21/2025)    [employer] [place] [employer] - Occupational Stress Questionnaire     Feeling of Stress : Not at all   Housing  Stability: Low Risk  (7/21/2025)    Housing Stability Vital Sign     Unable to Pay for Housing in the Last Year: No     Number of Times Moved in the Last Year: 0     Homeless in the Last Year: No

## 2025-07-22 NOTE — CONSULTS
[facility] - [address] [place], [place] [address]  Cardiology  Consult Note    Patient Name: Antonio Colon  MRN: [id number]  Admission Date: 7/20/2025  Hospital Length of Stay: 1 days  Code Status: Full Code   Attending Provider: Sandra Baker MD   Consulting Provider: ASHLEIGH Cross  Primary Care Physician: Bhavana Llanos MD  Principal Problem:Sepsis with acute organ dysfunction    Patient information was obtained from patient, past medical records, ER records, and primary team.     Inpatient consult to Cardiology  Consult performed by: Ailyn Whitmore FNP  Consult ordered by: Sandra Baker MD  Reason for consult: new onset afib        Subjective:     Chief Complaint:  N/V and abdominal pain     HPI:   7/20/2025:  68 y/o male with PMH of HTN, Type 2 DM( is on insulin), HLD, PVD, diabetic foot ulcers s/p right toe amputations who presented to [facility] with complain of abdominal pain, nausea and vomiting since Friday afternoon. The pain was localized in the umbilical region and described as sore rather than severe. The patient had not eaten since the onset of symptoms and experienced vomiting multiple times since Friday. The patient reported experiencing temperature fluctuations, feeling cold one minute and hot the next, accompanied by sweating. Bowel movements were reported as normal without diarrhea or constipation. Denies fever, chest pain, SOB, palpitation.  Currently has a wound on planter aspect but does not look infected.     7/22/2025:  Cardiology consulted for new onset afib with RVR. He denies any previous cardiac history. EKG afib with RVR, no ischemic changes. We started metoprolol 25 BID and ordered IV cardizem infusion earlier today however on assessment, infusion not yet started due to no IV access. BP improved and HR currently well controlled with metoprolol. Discontinued IV cardizem. Started Lovenox 1mg/kg BID while inpatient in case he requires surgical intervention of right foot  wound (can transition to Eliquis 5mg BID prior to discharge)    Past Medical History:   Diagnosis Date    Anemia     Arthritis     Diabetes mellitus, type 2     HLD (hyperlipidemia)     Hypertension     Immune deficiency disorder     Neuropathy     Peripheral vascular disease        Past Surgical History:   Procedure Laterality Date    APPENDECTOMY      L index finger repair from a cat bite during childhood      Surgical debridement of R foot 8/2020         Review of patient's allergies indicates:   Allergen Reactions    Nubain [nalbuphine]        No current facility-administered medications on file prior to encounter.     Current Outpatient Medications on File Prior to Encounter   Medication Sig    famotidine (PEPCID) 20 MG tablet Take 20 mg by mouth 2 (two) times daily.    losartan (COZAAR) 50 MG tablet Take 50 mg by mouth once daily.    rosuvastatin (CRESTOR) 5 MG tablet Take 5 mg by mouth every evening.    SEMGLEE,INSULIN GLARG-YFGN, unit/mL (3 mL) InPn Inject 38 Units into the skin every evening.    aspirin (ECOTRIN) 81 MG EC tablet Take 81 mg by mouth once daily.     Family History       Problem Relation (Age of Onset)    Diabetes Mother    Hypertension Father          Tobacco Use    Smoking status: Never    Smokeless tobacco: Never   Substance and Sexual Activity    Alcohol use: Not Currently    Drug use: Never    Sexual activity: Not on file     Review of Systems   Cardiovascular:  Positive for palpitations. Negative for chest pain, dyspnea on exertion, leg swelling and orthopnea.   Respiratory:  Negative for shortness of breath.    Gastrointestinal:  Positive for heartburn.     Objective:     Vital Signs (Most Recent):  Temp: 98 °F (36.7 °C) (07/22/25 1158)  Pulse: 105 (07/22/25 1158)  Resp: 18 (07/22/25 0407)  BP: 114/75 (07/22/25 1158)  SpO2: 97 % (07/22/25 1158) Vital Signs (24h Range):  Temp:  [97.5 °F (36.4 °C)-98.9 °F (37.2 °C)] 98 °F (36.7 °C)  Pulse:  [] 105  Resp:  [18-20] 18  SpO2:   "[96 %-99 %] 97 %  BP: (114-198)/() 114/75     Weight: 81.7 kg (180 lb 1.9 oz)  Body mass index is 25.84 kg/m².    SpO2: 97 %         Intake/Output Summary (Last 24 hours) at 7/22/2025 1517  Last data filed at 7/22/2025 1430  Gross per 24 hour   Intake 240 ml   Output 1600 ml   Net -1360 ml       Lines/Drains/Airways       Peripheral Intravenous Line  Duration             Peripheral IV 07/22/25 0900 20 G Left;Posterior Hand <1 day                     Physical Exam  Vitals reviewed.   Constitutional:       General: He is not in acute distress.  HENT:      Nose: Nose normal.      Mouth/Throat:      Mouth: Mucous membranes are moist.      Pharynx: Oropharynx is clear.   Eyes:      General: No scleral icterus.     Pupils: Pupils are equal, round, and reactive to light.   Cardiovascular:      Rate and Rhythm: Normal rate. Rhythm irregular.      Heart sounds: Normal heart sounds.   Pulmonary:      Effort: Pulmonary effort is normal.      Breath sounds: Normal breath sounds. No wheezing, rhonchi or rales.   Abdominal:      General: Bowel sounds are normal.      Palpations: Abdomen is soft.   Musculoskeletal:      Right lower leg: No edema.      Left lower leg: No edema.   Skin:     General: Skin is warm and dry.   Neurological:      Mental Status: He is alert and oriented to person, place, and time.          Significant Labs: ABG:   Recent Labs   Lab 07/20/25 2052   PH 7.58*   PCO2 23*   HCO3 21.6*   POCSATURATED 79*   , Blood Culture: No results for input(s): "LABBLOO" in the last 48 hours., BMP:   Recent Labs   Lab 07/20/25 2032 07/22/25 0339   * 69*   * 137   K 4.5 3.4*   CL 94* 106   CO2 18* 24   BUN 39* 19   CREATININE 2.06* 0.94   CALCIUM 9.7 8.3*   MG 1.9 2.0   , CMP   Recent Labs   Lab 07/20/25 2032 07/22/25 0339   * 137   K 4.5 3.4*   CL 94* 106   CO2 18* 24   * 69*   BUN 39* 19   CREATININE 2.06* 0.94   CALCIUM 9.7 8.3*   PROT 8.8* 6.9   ALBUMIN 4.1 3.4   BILITOT 1.2 1.1 " "  ALKPHOS 131 115   AST 56* 48*   ALT 35 33   ANIONGAP 21* 10   , CBC   Recent Labs   Lab 07/20/25 2032 07/20/25 2052 07/22/25  0339   WBC 22.66*  --  12.52*   HGB 15.2  --  13.1*   HCT 43.3   < > 39.2*     --  251    < > = values in this interval not displayed.   , INR No results for input(s): "INR", "PROTIME" in the last 48 hours., Lipid Panel No results for input(s): "CHOL", "HDL", "LDLCALC", "TRIG", "CHOLHDL" in the last 48 hours., and Troponin No results for input(s): "TROPONINIHS" in the last 48 hours.    Significant Imaging: Echocardiogram: Transthoracic echo (TTE) complete (Cupid Only):   Results for orders placed or performed during the hospital encounter of 07/20/25   Echo   Result Value Ref Range    BSA 2.01 m2    [facility] CV CPX PATIENT HEIGHT IN 70     LVOT stroke volume 41.2 cm3    LVIDd 3.5 3.5 - 6.0 cm    LV Systolic Volume 27 mL    LV Systolic Volume Index 13.5 mL/m2    LVIDs 2.7 2.1 - 4.0 cm    LV ESV A2C 39.95 mL    LV Diastolic Volume 50 mL    LV ESV A4C 22.76 mL    LV Diastolic Volume Index 25.00 mL/m2    Left Ventricular End Systolic Volume by Teichholz Method 26.76 mL    Left Ventricular End Diastolic Volume by Teichholz Method 50.05 mL    IVS 1.0 0.6 - 1.1 cm    LVOT diameter 2.1 cm    LVOT area 3.5 cm2    FS 22.9 (A) 28 - 44 %    Left Ventricle Relative Wall Thickness 0.69 cm    PW 1.2 (A) 0.6 - 1.1 cm    LV mass 119.0 g    LV Mass Index 59.5 g/m2    MV Peak E Santos 0.57 m/s    TDI LATERAL 0.11 m/s    TDI SEPTAL 0.10 m/s    E/E' ratio 5 m/s    MV Peak A Santos 0.44 m/s    TR Max Santos 1.5 m/s    E/A ratio 1.30     E wave deceleration time 278 msec    LV SEPTAL E/E' RATIO 5.7 m/s    LV LATERAL E/E' RATIO 5.2 m/s    LVOT peak santos 0.7 m/s    Left Ventricular Outflow Tract Mean Velocity 0.47 cm/s    Left Ventricular Outflow Tract Mean Gradient 1.07 mmHg    RV- english basal diam 3.3 cm    RV-english mid d 2.4 cm    RV mid diameter 2.39 cm    TAPSE 1.4 cm    RV/LV Ratio 0.94 cm    LA Vol (MOD) 33 mL    " BLANQUITA (MOD) 17 mL/m2    RA area length vol 32.80 mL    RA Area 13.7 cm2    RA vol index 16.40 mL/m2    RA Vol 35.87 mL    AV mean gradient 3 mmHg    AV peak gradient 4 mmHg    Ao peak papo 1.0 m/s    Ao VTI 17.8 cm    LVOT peak VTI 11.9 cm    AV valve area 2.3 cm²    AV Velocity Ratio 0.70     AV index (prosthetic) 0.67     PHAM by Velocity Ratio 2.4 cm²    MV stenosis pressure 1/2 time 80.53 ms    MV valve area p 1/2 method 2.73 cm2    Triscuspid Valve Regurgitation Peak Gradient 9 mmHg    PV PEAK VELOCITY 0.85 m/s    PV peak gradient 3 mmHg    Ao root annulus 3.4 cm    IVC diameter 1.86 cm    Mean e' 0.11 m/s    ZLVIDS -2.23     ZLVIDD -5.10     LA area A4C 12.27 cm2    LA area A2C 14.91 cm2    AORTIC VALVE CUSP SEPERATION 2.05 cm    TV resting pulmonary artery pressure 12 mmHg    RV TB RVSP 5 mmHg    Est. RA pres 3 mmHg    Narrative      Left Ventricle: The left ventricle is normal in size. Left ventricle   fractional shortening is 22.9%. Normal wall thickness. There is concentric   remodeling. Moderate global hypokinesis present. There is moderately   reduced systolic function with a visually estimated ejection fraction of   30 - 40%. Unable to assess diastolic function due to atrial fibrillation.    Right Ventricle: The right ventricle is normal in size measuring 3.3   cm. Systolic function is reduced.    Left Atrium: The left atrium is dilated.    Mitral Valve: There is mild regurgitation.    Pulmonary Artery: The estimated pulmonary artery systolic pressure is   12 mmHg.    IVC/SVC: Normal venous pressure at 3 mmHg.     , EKG:   Results for orders placed or performed during the hospital encounter of 10/09/23   EKG 12-lead    Collection Time: 10/09/23  6:37 AM    Narrative    Test Reason : R07.9,    Vent. Rate : 105 BPM     Atrial Rate : 105 BPM     P-R Int : 140 ms          QRS Dur : 088 ms      QT Int : 330 ms       P-R-T Axes : 066 053 077 degrees     QTc Int : 436 ms    Sinus tachycardia  Otherwise normal  "ECG  No previous ECGs available  Confirmed by Álvaro Benitez DO ([id number]) on 10/15/2023 5:51:28 PM    Referred By: DONA GONZALEZ           Confirmed By:Álvaro Benitez DO    , and X-Ray: CXR: X-Ray Chest 1 View (CXR): No results found for this visit on 07/20/25.  Assessment and Plan:     * Sepsis with acute organ dysfunction  - being followed by primary team    New onset of congestive heart failure  Patient has Systolic (HFrEF) heart failure that is Acute. On presentation their CHF was well compensated. Most recent BNP and echo results are listed below.  No results for input(s): "BNP" in the last 72 hours.  Latest ECHO  Results for orders placed during the hospital encounter of 07/20/25    Echo    Interpretation Summary    Left Ventricle: The left ventricle is normal in size. Left ventricle fractional shortening is 22.9%. Normal wall thickness. There is concentric remodeling. Moderate global hypokinesis present. There is moderately reduced systolic function with a visually estimated ejection fraction of 30 - 40%. Unable to assess diastolic function due to atrial fibrillation.    Right Ventricle: The right ventricle is normal in size measuring 3.3 cm. Systolic function is reduced.    Left Atrium: The left atrium is dilated.    Mitral Valve: There is mild regurgitation.    Pulmonary Artery: The estimated pulmonary artery systolic pressure is 12 mmHg.    IVC/SVC: Normal venous pressure at 3 mmHg.    Current Heart Failure Medications  , Daily, Oral  metoprolol succinate (TOPROL-XL) 24 hr tablet 50 mg, Daily, Oral  losartan tablet 50 mg, Daily, Oral    Plan  - Patient seen and evaluated by Dr. Trejo  - EF 30-40% (down from 50-55% in 1/2025 at OSH)  - Restart home Losartan (ROJAS resolved), continue Toprol 50; Add and up titrate GDMT as tolerated  - Will need outpatient ischemic evaluation scheduled at follow up  - Follow up with cardiology in 3 weeks, appointment scheduled    New onset a-fib  Patient has paroxysmal " (<7 days) atrial fibrillation. Patient is currently in atrial fibrillation. [id number] Score: 4. The patients heart rate in the last 24 hours is as follows:  Pulse  Min: 77  Max: 156     Antiarrhythmics  metoprolol succinate (TOPROL-XL) 24 hr tablet 50 mg, Daily, Oral    Anticoagulants  enoxaparin injection 80 mg, Every 12 hours, Subcutaneous    Plan  - Replete lytes with a goal of K>4, Mg >2  - Patient is anticoagulated, see medications listed above.  - Patient's afib is currently controlled  - Continue metoprolol 50 daily, transition to Eliquis 5mg BID prior to discharge (or if determined no surgical intervention needed)  - Follow up with cardiology in 3 weeks; appointment scheduled    Hyperlipidemia  - lipid panel ordered, continue statin    Type 2 diabetes mellitus, with long-term current use of insulin  - Hgb A1C 9.3, being followed by primary team    Benign essential HTN  Patient's blood pressure range in the last 24 hours was: BP  Min: 114/75  Max: 198/104.The patient's inpatient anti-hypertensive regimen is listed below:  Current Antihypertensives  , Daily, Oral  metoprolol succinate (TOPROL-XL) 24 hr tablet 50 mg, Daily, Oral    Plan  - discontinued amlodipine, Toprol XL 50 daily added per Dr. Trejo  - ROJAS resolved, restart home Losartan (EF 30-40%)    ROJAS (acute kidney injury)  Most recent creatinine and eGFR are listed below.  Recent Labs     07/20/25 2032 07/22/25  0339   CREATININE 2.06* 0.94   EGFRNORACEVR 35* 89      Plan  - ROJAS is resolved        VTE Risk Mitigation (From admission, onward)           Ordered     enoxaparin injection 80 mg  Every 12 hours         07/22/25 1155     IP VTE LOW RISK PATIENT  Once         07/21/25 0216                    Thank you for your consult. I will sign off. Please contact us if you have any additional questions.    Ailyn Whitmore, ASHLEIGH  Cardiology   [facility] - [address] [place] [address]

## 2025-07-22 NOTE — ASSESSMENT & PLAN NOTE
Patient's blood pressure range in the last 24 hours was: BP  Min: 114/75  Max: 198/104.The patient's inpatient anti-hypertensive regimen is listed below:  Current Antihypertensives  , Daily, Oral  metoprolol succinate (TOPROL-XL) 24 hr tablet 50 mg, Daily, Oral    Plan  - discontinued amlodipine, Toprol XL 50 daily added per Dr. Trejo  - ROJAS resolved, restart home Losartan (EF 30-40%)

## 2025-07-22 NOTE — ASSESSMENT & PLAN NOTE
Patient has paroxysmal (<7 days) atrial fibrillation. Patient is currently in atrial fibrillation. [id number] Score: 4. The patients heart rate in the last 24 hours is as follows:  Pulse  Min: 77  Max: 156     Antiarrhythmics  metoprolol succinate (TOPROL-XL) 24 hr tablet 50 mg, Daily, Oral    Anticoagulants  enoxaparin injection 80 mg, Every 12 hours, Subcutaneous    Plan  - Replete lytes with a goal of K>4, Mg >2  - Patient is anticoagulated, see medications listed above.  - Patient's afib is currently controlled  - Continue metoprolol 50 daily, transition to Eliquis 5mg BID prior to discharge (or if determined no surgical intervention needed)  - Follow up with cardiology in 3 weeks; appointment scheduled

## 2025-07-22 NOTE — HPI
7/20/2025:  70 y/o male with PMH of HTN, Type 2 DM( is on insulin), HLD, PVD, diabetic foot ulcers s/p right toe amputations who presented to [facility] with complain of abdominal pain, nausea and vomiting since Friday afternoon. The pain was localized in the umbilical region and described as sore rather than severe. The patient had not eaten since the onset of symptoms and experienced vomiting multiple times since Friday. The patient reported experiencing temperature fluctuations, feeling cold one minute and hot the next, accompanied by sweating. Bowel movements were reported as normal without diarrhea or constipation. Denies fever, chest pain, SOB, palpitation.  Currently has a wound on planter aspect but does not look infected.     7/22/2025:  Cardiology consulted for new onset afib with RVR. He denies any previous cardiac history. EKG afib with RVR, no ischemic changes. We started metoprolol 25 BID and ordered IV cardizem infusion earlier today however on assessment, infusion not yet started due to no IV access. BP improved and HR currently well controlled with metoprolol. Discontinued IV cardizem. Started Lovenox 1mg/kg BID while inpatient in case he requires surgical intervention of right foot wound (can transition to Eliquis 5mg BID prior to discharge)

## 2025-07-22 NOTE — CONSULTS
[facility] - [facility]  Gastroenterology  Consult Note    Patient Name: Antonio Colon  MRN: [id number]  Admission Date: 7/20/2025  Hospital Length of Stay: 1 days  Code Status: Full Code   Attending Provider: Sandra Baker MD   Consulting Provider: Chuy Bills MD  Primary Care Physician: Bhavana Llanos MD  Principal Problem:Sepsis with acute organ dysfunction    Inpatient consult to Gastroenterology  Consult performed by: Chuy Bills MD  Consult ordered by: Sandra Baker MD  Reason for consult: Dysphagia        Subjective:     HPI:  This patient is a 69-year-old man admitted to the hospital with infection of unclear source, likely his foot.  He states that he developed nausea and vomiting over this past weekend for several days.  He has since had dysphagia.  He states that he does develop nausea and vomiting when he gets infected.  He does have diabetes mellitus.    Past Medical History:   Diagnosis Date    Anemia     Arthritis     Diabetes mellitus, type 2     HLD (hyperlipidemia)     Hypertension     Immune deficiency disorder     Neuropathy     Peripheral vascular disease        Past Surgical History:   Procedure Laterality Date    APPENDECTOMY      L index finger repair from a cat bite during childhood      Surgical debridement of R foot 8/2020         Review of patient's allergies indicates:   Allergen Reactions    Nubain [nalbuphine]      Family History       Problem Relation (Age of Onset)    Diabetes Mother    Hypertension Father          Tobacco Use    Smoking status: Never    Smokeless tobacco: Never   Substance and Sexual Activity    Alcohol use: Not Currently    Drug use: Never    Sexual activity: Not on file     Review of Systems   Constitutional:  Positive for activity change.   HENT:  Positive for trouble swallowing.    Gastrointestinal:  Positive for nausea and vomiting.   Musculoskeletal:  Positive for arthralgias.     Objective:     Vital Signs (Most  Recent):  Temp: 98.3 °F (36.8 °C) (07/22/25 1540)  Pulse: 87 (07/22/25 1540)  Resp: 18 (07/22/25 0407)  BP: 111/73 (07/22/25 1540)  SpO2: 97 % (07/22/25 1540) Vital Signs (24h Range):  Temp:  [97.5 °F (36.4 °C)-98.9 °F (37.2 °C)] 98.3 °F (36.8 °C)  Pulse:  [] 87  Resp:  [18-20] 18  SpO2:  [96 %-99 %] 97 %  BP: (111-198)/() 111/73     Weight: 81.7 kg (180 lb 1.9 oz) (07/22/25 0200)  Body mass index is 25.84 kg/m².      Intake/Output Summary (Last 24 hours) at 7/22/2025 1827  Last data filed at 7/22/2025 1430  Gross per 24 hour   Intake 240 ml   Output 1600 ml   Net -1360 ml       Lines/Drains/Airways       Peripheral Intravenous Line  Duration             Peripheral IV 07/22/25 0900 20 G Left;Posterior Hand <1 day                    Physical Exam  Vitals reviewed.   Constitutional:       General: He is not in acute distress.     Appearance: Normal appearance. He is well-developed. He is not ill-appearing.   HENT:      Head: Normocephalic and atraumatic.      Nose: Nose normal.   Eyes:      Pupils: Pupils are equal, round, and reactive to light.   Cardiovascular:      Rate and Rhythm: Normal rate and regular rhythm.   Pulmonary:      Effort: Pulmonary effort is normal.      Breath sounds: Normal breath sounds. No wheezing.   Abdominal:      General: Abdomen is flat. Bowel sounds are normal. There is no distension.      Palpations: Abdomen is soft.      Tenderness: There is no abdominal tenderness. There is no guarding.   Skin:     General: Skin is warm and dry.      Coloration: Skin is not jaundiced.   Neurological:      Mental Status: He is alert.   Psychiatric:         Attention and Perception: Attention normal.         Mood and Affect: Affect normal.         Speech: Speech normal.         Behavior: Behavior is cooperative.      Comments: Pt was calm while speaking.         Significant Labs:  CBC:   Recent Labs   Lab 07/20/25 2032 07/20/25 2052 07/22/25  0339   WBC 22.66*  --  12.52*   HGB 15.2  --   13.1*   HCT 43.3 52* 39.2*     --  251     CMP:   Recent Labs   Lab 07/22/25  0339   GLU 69*   CALCIUM 8.3*   ALBUMIN 3.4   PROT 6.9      K 3.4*   CO2 24      BUN 19   CREATININE 0.94   ALKPHOS 115   ALT 33   AST 48*   BILITOT 1.1       Significant Imaging:  Imaging results within the past 24 hours have been reviewed.    Assessment/Plan:     Active Diagnoses:    Diagnosis Date Noted POA    PRINCIPAL PROBLEM:  Sepsis with acute organ dysfunction [A41.9, R65.20] 07/21/2025 Yes    New onset a-fib [I48.91] 07/22/2025 No    New onset of congestive heart failure [I50.9] 07/22/2025 Yes    ROJAS (acute kidney injury) [N17.9] 07/21/2025 Yes    Benign essential HTN [I10] 07/21/2025 Yes    Type 2 diabetes mellitus, with long-term current use of insulin [E11.9, Z79.4] 07/21/2025 Not Applicable    Hyperlipidemia [E78.5] 07/21/2025 Yes    Cannabis dependence [F12.20] 07/21/2025 Yes      Problems Resolved During this Admission:       Impression:  Nausea with vomiting, esophageal dysphagia, infection of unclear source, acute kidney injury, hypertension, new onset atrial fibrillation  Plan:  PPI therapy, anticipate EGD with dilation of the esophagus tomorrow.    Thank you for your consult. I will follow-up with patient. Please contact us if you have any additional questions.    Chuy Bills MD  Gastroenterology  [facility] - [address] [place] [address]

## 2025-07-22 NOTE — SUBJECTIVE & OBJECTIVE
Past Medical History:   Diagnosis Date    Anemia     Arthritis     Diabetes mellitus, type 2     HLD (hyperlipidemia)     Hypertension     Immune deficiency disorder     Neuropathy     Peripheral vascular disease        Past Surgical History:   Procedure Laterality Date    APPENDECTOMY      L index finger repair from a cat bite during childhood      Surgical debridement of R foot 8/2020         Review of patient's allergies indicates:   Allergen Reactions    Nubain [nalbuphine]        No current facility-administered medications on file prior to encounter.     Current Outpatient Medications on File Prior to Encounter   Medication Sig    famotidine (PEPCID) 20 MG tablet Take 20 mg by mouth 2 (two) times daily.    losartan (COZAAR) 50 MG tablet Take 50 mg by mouth once daily.    rosuvastatin (CRESTOR) 5 MG tablet Take 5 mg by mouth every evening.    SEMGLEE,INSULIN GLARG-YFGN, unit/mL (3 mL) InPn Inject 38 Units into the skin every evening.    aspirin (ECOTRIN) 81 MG EC tablet Take 81 mg by mouth once daily.     Family History       Problem Relation (Age of Onset)    Diabetes Mother    Hypertension Father          Tobacco Use    Smoking status: Never    Smokeless tobacco: Never   Substance and Sexual Activity    Alcohol use: Not Currently    Drug use: Never    Sexual activity: Not on file     Review of Systems   Cardiovascular:  Positive for palpitations. Negative for chest pain, dyspnea on exertion, leg swelling and orthopnea.   Respiratory:  Negative for shortness of breath.    Gastrointestinal:  Positive for heartburn.     Objective:     Vital Signs (Most Recent):  Temp: 98 °F (36.7 °C) (07/22/25 1158)  Pulse: 105 (07/22/25 1158)  Resp: 18 (07/22/25 0407)  BP: 114/75 (07/22/25 1158)  SpO2: 97 % (07/22/25 1158) Vital Signs (24h Range):  Temp:  [97.5 °F (36.4 °C)-98.9 °F (37.2 °C)] 98 °F (36.7 °C)  Pulse:  [] 105  Resp:  [18-20] 18  SpO2:  [96 %-99 %] 97 %  BP: (114-198)/() 114/75     Weight: 81.7  "kg (180 lb 1.9 oz)  Body mass index is 25.84 kg/m².    SpO2: 97 %         Intake/Output Summary (Last 24 hours) at 7/22/2025 1517  Last data filed at 7/22/2025 1430  Gross per 24 hour   Intake 240 ml   Output 1600 ml   Net -1360 ml       Lines/Drains/Airways       Peripheral Intravenous Line  Duration             Peripheral IV 07/22/25 0900 20 G Left;Posterior Hand <1 day                     Physical Exam  Vitals reviewed.   Constitutional:       General: He is not in acute distress.  HENT:      Nose: Nose normal.      Mouth/Throat:      Mouth: Mucous membranes are moist.      Pharynx: Oropharynx is clear.   Eyes:      General: No scleral icterus.     Pupils: Pupils are equal, round, and reactive to light.   Cardiovascular:      Rate and Rhythm: Normal rate. Rhythm irregular.      Heart sounds: Normal heart sounds.   Pulmonary:      Effort: Pulmonary effort is normal.      Breath sounds: Normal breath sounds. No wheezing, rhonchi or rales.   Abdominal:      General: Bowel sounds are normal.      Palpations: Abdomen is soft.   Musculoskeletal:      Right lower leg: No edema.      Left lower leg: No edema.   Skin:     General: Skin is warm and dry.   Neurological:      Mental Status: He is alert and oriented to person, place, and time.          Significant Labs: ABG:   Recent Labs   Lab 07/20/25 2052   PH 7.58*   PCO2 23*   HCO3 21.6*   POCSATURATED 79*   , Blood Culture: No results for input(s): "LABBLOO" in the last 48 hours., BMP:   Recent Labs   Lab 07/20/25 2032 07/22/25  0339   * 69*   * 137   K 4.5 3.4*   CL 94* 106   CO2 18* 24   BUN 39* 19   CREATININE 2.06* 0.94   CALCIUM 9.7 8.3*   MG 1.9 2.0   , CMP   Recent Labs   Lab 07/20/25 2032 07/22/25  0339   * 137   K 4.5 3.4*   CL 94* 106   CO2 18* 24   * 69*   BUN 39* 19   CREATININE 2.06* 0.94   CALCIUM 9.7 8.3*   PROT 8.8* 6.9   ALBUMIN 4.1 3.4   BILITOT 1.2 1.1   ALKPHOS 131 115   AST 56* 48*   ALT 35 33   ANIONGAP 21* 10   , CBC " "  Recent Labs   Lab 07/20/25 2032 07/20/25 2052 07/22/25  0339   WBC 22.66*  --  12.52*   HGB 15.2  --  13.1*   HCT 43.3   < > 39.2*     --  251    < > = values in this interval not displayed.   , INR No results for input(s): "INR", "PROTIME" in the last 48 hours., Lipid Panel No results for input(s): "CHOL", "HDL", "LDLCALC", "TRIG", "CHOLHDL" in the last 48 hours., and Troponin No results for input(s): "TROPONINIHS" in the last 48 hours.    Significant Imaging: Echocardiogram: Transthoracic echo (TTE) complete (Cupid Only):   Results for orders placed or performed during the hospital encounter of 07/20/25   Echo   Result Value Ref Range    BSA 2.01 m2    [facility] CV CPX PATIENT HEIGHT IN 70     LVOT stroke volume 41.2 cm3    LVIDd 3.5 3.5 - 6.0 cm    LV Systolic Volume 27 mL    LV Systolic Volume Index 13.5 mL/m2    LVIDs 2.7 2.1 - 4.0 cm    LV ESV A2C 39.95 mL    LV Diastolic Volume 50 mL    LV ESV A4C 22.76 mL    LV Diastolic Volume Index 25.00 mL/m2    Left Ventricular End Systolic Volume by Teichholz Method 26.76 mL    Left Ventricular End Diastolic Volume by Teichholz Method 50.05 mL    IVS 1.0 0.6 - 1.1 cm    LVOT diameter 2.1 cm    LVOT area 3.5 cm2    FS 22.9 (A) 28 - 44 %    Left Ventricle Relative Wall Thickness 0.69 cm    PW 1.2 (A) 0.6 - 1.1 cm    LV mass 119.0 g    LV Mass Index 59.5 g/m2    MV Peak E Santos 0.57 m/s    TDI LATERAL 0.11 m/s    TDI SEPTAL 0.10 m/s    E/E' ratio 5 m/s    MV Peak A Santos 0.44 m/s    TR Max Santos 1.5 m/s    E/A ratio 1.30     E wave deceleration time 278 msec    LV SEPTAL E/E' RATIO 5.7 m/s    LV LATERAL E/E' RATIO 5.2 m/s    LVOT peak santos 0.7 m/s    Left Ventricular Outflow Tract Mean Velocity 0.47 cm/s    Left Ventricular Outflow Tract Mean Gradient 1.07 mmHg    RV- english basal diam 3.3 cm    RV-english mid d 2.4 cm    RV mid diameter 2.39 cm    TAPSE 1.4 cm    RV/LV Ratio 0.94 cm    LA Vol (MOD) 33 mL    BLANQUITA (MOD) 17 mL/m2    RA area length vol 32.80 mL    RA Area 13.7 cm2 "    RA vol index 16.40 mL/m2    RA Vol 35.87 mL    AV mean gradient 3 mmHg    AV peak gradient 4 mmHg    Ao peak papo 1.0 m/s    Ao VTI 17.8 cm    LVOT peak VTI 11.9 cm    AV valve area 2.3 cm²    AV Velocity Ratio 0.70     AV index (prosthetic) 0.67     PHAM by Velocity Ratio 2.4 cm²    MV stenosis pressure 1/2 time 80.53 ms    MV valve area p 1/2 method 2.73 cm2    Triscuspid Valve Regurgitation Peak Gradient 9 mmHg    PV PEAK VELOCITY 0.85 m/s    PV peak gradient 3 mmHg    Ao root annulus 3.4 cm    IVC diameter 1.86 cm    Mean e' 0.11 m/s    ZLVIDS -2.23     ZLVIDD -5.10     LA area A4C 12.27 cm2    LA area A2C 14.91 cm2    AORTIC VALVE CUSP SEPERATION 2.05 cm    TV resting pulmonary artery pressure 12 mmHg    RV TB RVSP 5 mmHg    Est. RA pres 3 mmHg    Narrative      Left Ventricle: The left ventricle is normal in size. Left ventricle   fractional shortening is 22.9%. Normal wall thickness. There is concentric   remodeling. Moderate global hypokinesis present. There is moderately   reduced systolic function with a visually estimated ejection fraction of   30 - 40%. Unable to assess diastolic function due to atrial fibrillation.    Right Ventricle: The right ventricle is normal in size measuring 3.3   cm. Systolic function is reduced.    Left Atrium: The left atrium is dilated.    Mitral Valve: There is mild regurgitation.    Pulmonary Artery: The estimated pulmonary artery systolic pressure is   12 mmHg.    IVC/SVC: Normal venous pressure at 3 mmHg.     , EKG:   Results for orders placed or performed during the hospital encounter of 10/09/23   EKG 12-lead    Collection Time: 10/09/23  6:37 AM    Narrative    Test Reason : R07.9,    Vent. Rate : 105 BPM     Atrial Rate : 105 BPM     P-R Int : 140 ms          QRS Dur : 088 ms      QT Int : 330 ms       P-R-T Axes : 066 053 077 degrees     QTc Int : 436 ms    Sinus tachycardia  Otherwise normal ECG  No previous ECGs available  Confirmed by Álvaro Benitez DO ([id number])  on 10/15/2023 5:51:28 PM    Referred By: DONA GONZALEZ           Confirmed By:Álvaro Benitez DO    , and X-Ray: CXR: X-Ray Chest 1 View (CXR): No results found for this visit on 07/20/25.

## 2025-07-22 NOTE — ASSESSMENT & PLAN NOTE
"Patient has Systolic (HFrEF) heart failure that is Acute. On presentation their CHF was well compensated. Most recent BNP and echo results are listed below.  No results for input(s): "BNP" in the last 72 hours.  Latest ECHO  Results for orders placed during the hospital encounter of 07/20/25    Echo    Interpretation Summary    Left Ventricle: The left ventricle is normal in size. Left ventricle fractional shortening is 22.9%. Normal wall thickness. There is concentric remodeling. Moderate global hypokinesis present. There is moderately reduced systolic function with a visually estimated ejection fraction of 30 - 40%. Unable to assess diastolic function due to atrial fibrillation.    Right Ventricle: The right ventricle is normal in size measuring 3.3 cm. Systolic function is reduced.    Left Atrium: The left atrium is dilated.    Mitral Valve: There is mild regurgitation.    Pulmonary Artery: The estimated pulmonary artery systolic pressure is 12 mmHg.    IVC/SVC: Normal venous pressure at 3 mmHg.    Current Heart Failure Medications  , Daily, Oral  metoprolol succinate (TOPROL-XL) 24 hr tablet 50 mg, Daily, Oral  losartan tablet 50 mg, Daily, Oral    Plan  - Patient seen and evaluated by Dr. Trejo  - EF 30-40% (down from 50-55% in 1/2025 at OSH)  - Restart home Losartan (ROJAS resolved), continue Toprol 50; Add and up titrate GDMT as tolerated  - Will need outpatient ischemic evaluation scheduled at follow up  - Follow up with cardiology in 3 weeks, appointment scheduled  "

## 2025-07-22 NOTE — ASSESSMENT & PLAN NOTE
Most recent creatinine and eGFR are listed below.  Recent Labs     07/20/25 2032 07/22/25  0339   CREATININE 2.06* 0.94   EGFRNORACEVR 35* 89      Plan  - ROJAS is resolved

## 2025-07-23 ENCOUNTER — ANESTHESIA (OUTPATIENT)
Dept: GASTROENTEROLOGY | Facility: HOSPITAL | Age: 70
End: 2025-07-23
Payer: MEDICARE

## 2025-07-23 ENCOUNTER — ANESTHESIA EVENT (OUTPATIENT)
Dept: GASTROENTEROLOGY | Facility: HOSPITAL | Age: 70
End: 2025-07-23
Payer: MEDICARE

## 2025-07-23 PROBLEM — K44.9 HH (HIATUS HERNIA): Status: ACTIVE | Noted: 2025-07-23

## 2025-07-23 PROBLEM — K21.00 GASTROESOPHAGEAL REFLUX DISEASE WITH ESOPHAGITIS WITHOUT HEMORRHAGE: Status: ACTIVE | Noted: 2025-07-23

## 2025-07-23 PROBLEM — S91.301A WOUND OF RIGHT FOOT: Status: ACTIVE | Noted: 2025-07-23

## 2025-07-23 PROBLEM — K29.70 GASTRITIS WITHOUT BLEEDING: Status: ACTIVE | Noted: 2025-07-23

## 2025-07-23 LAB
ALBUMIN SERPL BCP-MCNC: 3.2 G/DL (ref 3.4–4.8)
ALBUMIN/GLOB SERPL: 0.9 {RATIO}
ALP SERPL-CCNC: 95 U/L (ref 40–150)
ALT SERPL W P-5'-P-CCNC: 33 U/L
ANION GAP SERPL CALCULATED.3IONS-SCNC: 10 MMOL/L (ref 7–16)
AST SERPL W P-5'-P-CCNC: 35 U/L (ref 11–45)
BASOPHILS # BLD AUTO: 0.11 K/UL (ref 0–0.2)
BASOPHILS NFR BLD AUTO: 1 % (ref 0–1)
BILIRUB SERPL-MCNC: 1.2 MG/DL
BUN SERPL-MCNC: 14 MG/DL (ref 8–26)
BUN/CREAT SERPL: 14 (ref 6–20)
CALCIUM SERPL-MCNC: 8.4 MG/DL (ref 8.8–10)
CHLORIDE SERPL-SCNC: 104 MMOL/L (ref 98–107)
CHOLEST SERPL-MCNC: 126 MG/DL
CHOLEST/HDLC SERPL: 3 {RATIO}
CO2 SERPL-SCNC: 26 MMOL/L (ref 23–31)
CREAT SERPL-MCNC: 1.02 MG/DL (ref 0.72–1.25)
DIFFERENTIAL METHOD BLD: ABNORMAL
EGFR (NO RACE VARIABLE) (RUSH/TITUS): 81 ML/MIN/1.73M2
EOSINOPHIL # BLD AUTO: 0.24 K/UL (ref 0–0.5)
EOSINOPHIL NFR BLD AUTO: 2.2 % (ref 1–4)
ERYTHROCYTE [DISTWIDTH] IN BLOOD BY AUTOMATED COUNT: 12.4 % (ref 11.5–14.5)
GLOBULIN SER-MCNC: 3.6 G/DL (ref 2–4)
GLUCOSE SERPL-MCNC: 118 MG/DL (ref 70–105)
GLUCOSE SERPL-MCNC: 125 MG/DL (ref 82–115)
GLUCOSE SERPL-MCNC: 164 MG/DL (ref 70–105)
GLUCOSE SERPL-MCNC: 179 MG/DL (ref 70–105)
GLUCOSE SERPL-MCNC: 275 MG/DL (ref 70–105)
HCT VFR BLD AUTO: 41.7 % (ref 40–54)
HDLC SERPL-MCNC: 42 MG/DL (ref 35–60)
HGB BLD-MCNC: 14.3 G/DL (ref 13.5–18)
IMM GRANULOCYTES # BLD AUTO: 0.07 K/UL (ref 0–0.04)
IMM GRANULOCYTES NFR BLD: 0.7 % (ref 0–0.4)
LDLC SERPL CALC-MCNC: 63 MG/DL
LDLC/HDLC SERPL: 1.5 {RATIO}
LYMPHOCYTES # BLD AUTO: 2.35 K/UL (ref 1–4.8)
LYMPHOCYTES NFR BLD AUTO: 22 % (ref 27–41)
MCH RBC QN AUTO: 30.2 PG (ref 27–31)
MCHC RBC AUTO-ENTMCNC: 34.3 G/DL (ref 32–36)
MCV RBC AUTO: 88.2 FL (ref 80–96)
MONOCYTES # BLD AUTO: 1.32 K/UL (ref 0–0.8)
MONOCYTES NFR BLD AUTO: 12.4 % (ref 2–6)
MPC BLD CALC-MCNC: 10.4 FL (ref 9.4–12.4)
NEUTROPHILS # BLD AUTO: 6.58 K/UL (ref 1.8–7.7)
NEUTROPHILS NFR BLD AUTO: 61.7 % (ref 53–65)
NONHDLC SERPL-MCNC: 84 MG/DL
NRBC # BLD AUTO: 0 X10E3/UL
NRBC, AUTO (.00): 0 %
PLATELET # BLD AUTO: 271 K/UL (ref 150–400)
POTASSIUM SERPL-SCNC: 3.5 MMOL/L (ref 3.5–5.1)
PROT SERPL-MCNC: 6.8 G/DL (ref 5.8–7.6)
RBC # BLD AUTO: 4.73 M/UL (ref 4.6–6.2)
SODIUM SERPL-SCNC: 136 MMOL/L (ref 136–145)
TRIGL SERPL-MCNC: 106 MG/DL (ref 34–140)
VLDLC SERPL-MCNC: 21 MG/DL
WBC # BLD AUTO: 10.67 K/UL (ref 4.5–11)

## 2025-07-23 PROCEDURE — 43450 DILATE ESOPHAGUS 1/MULT PASS: CPT | Mod: XS,,, | Performed by: INTERNAL MEDICINE

## 2025-07-23 PROCEDURE — 25000003 PHARM REV CODE 250: Performed by: NURSE PRACTITIONER

## 2025-07-23 PROCEDURE — 85025 COMPLETE CBC W/AUTO DIFF WBC: CPT

## 2025-07-23 PROCEDURE — 88312 SPECIAL STAINS GROUP 1: CPT | Mod: 26,,, | Performed by: PATHOLOGY

## 2025-07-23 PROCEDURE — 80061 LIPID PANEL: CPT | Performed by: NURSE PRACTITIONER

## 2025-07-23 PROCEDURE — 88342 IMHCHEM/IMCYTCHM 1ST ANTB: CPT | Mod: TC,SUR | Performed by: INTERNAL MEDICINE

## 2025-07-23 PROCEDURE — 94761 N-INVAS EAR/PLS OXIMETRY MLT: CPT

## 2025-07-23 PROCEDURE — 25000003 PHARM REV CODE 250

## 2025-07-23 PROCEDURE — 63600175 PHARM REV CODE 636 W HCPCS: Performed by: INTERNAL MEDICINE

## 2025-07-23 PROCEDURE — 82962 GLUCOSE BLOOD TEST: CPT

## 2025-07-23 PROCEDURE — 88341 IMHCHEM/IMCYTCHM EA ADD ANTB: CPT | Mod: 26,,, | Performed by: PATHOLOGY

## 2025-07-23 PROCEDURE — 0DB78ZX EXCISION OF STOMACH, PYLORUS, VIA NATURAL OR ARTIFICIAL OPENING ENDOSCOPIC, DIAGNOSTIC: ICD-10-PCS | Performed by: INTERNAL MEDICINE

## 2025-07-23 PROCEDURE — 88342 IMHCHEM/IMCYTCHM 1ST ANTB: CPT | Mod: 26,,, | Performed by: PATHOLOGY

## 2025-07-23 PROCEDURE — 88305 TISSUE EXAM BY PATHOLOGIST: CPT | Mod: 26,,, | Performed by: PATHOLOGY

## 2025-07-23 PROCEDURE — 25000003 PHARM REV CODE 250: Performed by: INTERNAL MEDICINE

## 2025-07-23 PROCEDURE — 63600175 PHARM REV CODE 636 W HCPCS: Performed by: NURSE ANESTHETIST, CERTIFIED REGISTERED

## 2025-07-23 PROCEDURE — 0DB68ZX EXCISION OF STOMACH, VIA NATURAL OR ARTIFICIAL OPENING ENDOSCOPIC, DIAGNOSTIC: ICD-10-PCS | Performed by: INTERNAL MEDICINE

## 2025-07-23 PROCEDURE — 43239 EGD BIOPSY SINGLE/MULTIPLE: CPT | Performed by: INTERNAL MEDICINE

## 2025-07-23 PROCEDURE — 11000001 HC ACUTE MED/SURG PRIVATE ROOM

## 2025-07-23 PROCEDURE — 80053 COMPREHEN METABOLIC PANEL: CPT

## 2025-07-23 PROCEDURE — 43239 EGD BIOPSY SINGLE/MULTIPLE: CPT | Mod: ,,, | Performed by: INTERNAL MEDICINE

## 2025-07-23 PROCEDURE — 36415 COLL VENOUS BLD VENIPUNCTURE: CPT

## 2025-07-23 PROCEDURE — 43450 DILATE ESOPHAGUS 1/MULT PASS: CPT | Mod: XS | Performed by: INTERNAL MEDICINE

## 2025-07-23 PROCEDURE — 99233 SBSQ HOSP IP/OBS HIGH 50: CPT | Mod: ,,,

## 2025-07-23 PROCEDURE — 0D758ZZ DILATION OF ESOPHAGUS, VIA NATURAL OR ARTIFICIAL OPENING ENDOSCOPIC: ICD-10-PCS | Performed by: INTERNAL MEDICINE

## 2025-07-23 PROCEDURE — 63600175 PHARM REV CODE 636 W HCPCS: Performed by: NURSE PRACTITIONER

## 2025-07-23 PROCEDURE — 63600175 PHARM REV CODE 636 W HCPCS

## 2025-07-23 PROCEDURE — 0DB58ZX EXCISION OF ESOPHAGUS, VIA NATURAL OR ARTIFICIAL OPENING ENDOSCOPIC, DIAGNOSTIC: ICD-10-PCS | Performed by: INTERNAL MEDICINE

## 2025-07-23 RX ORDER — PHENYLEPHRINE HYDROCHLORIDE 10 MG/ML
INJECTION INTRAVENOUS
Status: DISCONTINUED | OUTPATIENT
Start: 2025-07-23 | End: 2025-07-23

## 2025-07-23 RX ORDER — SODIUM CHLORIDE 0.9 % (FLUSH) 0.9 %
10 SYRINGE (ML) INJECTION EVERY 6 HOURS PRN
Status: DISCONTINUED | OUTPATIENT
Start: 2025-07-23 | End: 2025-07-25 | Stop reason: HOSPADM

## 2025-07-23 RX ORDER — PANTOPRAZOLE SODIUM 40 MG/10ML
40 INJECTION, POWDER, LYOPHILIZED, FOR SOLUTION INTRAVENOUS 2 TIMES DAILY
Status: DISCONTINUED | OUTPATIENT
Start: 2025-07-23 | End: 2025-07-25 | Stop reason: HOSPADM

## 2025-07-23 RX ORDER — LIDOCAINE HYDROCHLORIDE 20 MG/ML
INJECTION, SOLUTION EPIDURAL; INFILTRATION; INTRACAUDAL; PERINEURAL
Status: DISCONTINUED | OUTPATIENT
Start: 2025-07-23 | End: 2025-07-23

## 2025-07-23 RX ORDER — PROPOFOL 10 MG/ML
VIAL (ML) INTRAVENOUS
Status: DISCONTINUED | OUTPATIENT
Start: 2025-07-23 | End: 2025-07-23

## 2025-07-23 RX ORDER — SODIUM CHLORIDE, SODIUM LACTATE, POTASSIUM CHLORIDE, CALCIUM CHLORIDE 600; 310; 30; 20 MG/100ML; MG/100ML; MG/100ML; MG/100ML
INJECTION, SOLUTION INTRAVENOUS CONTINUOUS
Status: DISCONTINUED | OUTPATIENT
Start: 2025-07-23 | End: 2025-07-24

## 2025-07-23 RX ADMIN — PROPOFOL 100 MG: 10 INJECTION, EMULSION INTRAVENOUS at 08:07

## 2025-07-23 RX ADMIN — LOSARTAN POTASSIUM 50 MG: 50 TABLET, FILM COATED ORAL at 08:07

## 2025-07-23 RX ADMIN — ASPIRIN 81 MG: 81 TABLET, COATED ORAL at 08:07

## 2025-07-23 RX ADMIN — PIPERACILLIN AND TAZOBACTAM 4.5 G: 4; .5 INJECTION, POWDER, LYOPHILIZED, FOR SOLUTION INTRAVENOUS; PARENTERAL at 06:07

## 2025-07-23 RX ADMIN — PHENYLEPHRINE HYDROCHLORIDE 200 MCG: 10 INJECTION INTRAVENOUS at 08:07

## 2025-07-23 RX ADMIN — ENOXAPARIN SODIUM 80 MG: 80 INJECTION SUBCUTANEOUS at 09:07

## 2025-07-23 RX ADMIN — PIPERACILLIN AND TAZOBACTAM 4.5 G: 4; .5 INJECTION, POWDER, LYOPHILIZED, FOR SOLUTION INTRAVENOUS; PARENTERAL at 10:07

## 2025-07-23 RX ADMIN — PIPERACILLIN AND TAZOBACTAM 4.5 G: 4; .5 INJECTION, POWDER, LYOPHILIZED, FOR SOLUTION INTRAVENOUS; PARENTERAL at 03:07

## 2025-07-23 RX ADMIN — PANTOPRAZOLE SODIUM 40 MG: 40 INJECTION, POWDER, FOR SOLUTION INTRAVENOUS at 10:07

## 2025-07-23 RX ADMIN — LIDOCAINE HYDROCHLORIDE 100 MG: 20 INJECTION, SOLUTION EPIDURAL; INFILTRATION; INTRACAUDAL; PERINEURAL at 08:07

## 2025-07-23 RX ADMIN — ATORVASTATIN CALCIUM 20 MG: 20 TABLET, FILM COATED ORAL at 09:07

## 2025-07-23 RX ADMIN — PROPOFOL 30 MG: 10 INJECTION, EMULSION INTRAVENOUS at 08:07

## 2025-07-23 RX ADMIN — INSULIN GLARGINE 20 UNITS: 100 INJECTION, SOLUTION SUBCUTANEOUS at 09:07

## 2025-07-23 RX ADMIN — VANCOMYCIN HYDROCHLORIDE 1250 MG: 1 INJECTION, POWDER, LYOPHILIZED, FOR SOLUTION INTRAVENOUS at 04:07

## 2025-07-23 RX ADMIN — ALUMINUM HYDROXIDE, MAGNESIUM HYDROXIDE, AND DIMETHICONE 30 ML: 400; 400; 40 SUSPENSION ORAL at 03:07

## 2025-07-23 RX ADMIN — PANTOPRAZOLE SODIUM 40 MG: 40 INJECTION, POWDER, FOR SOLUTION INTRAVENOUS at 09:07

## 2025-07-23 RX ADMIN — FAMOTIDINE 20 MG: 20 TABLET, FILM COATED ORAL at 08:07

## 2025-07-23 RX ADMIN — METOPROLOL SUCCINATE 50 MG: 50 TABLET, EXTENDED RELEASE ORAL at 08:07

## 2025-07-23 NOTE — PHYSICIAN QUERY
Please clarify the conflicting documentation regarding the CHF diagnosis    Other (please specify): New onset congestive heart failure

## 2025-07-23 NOTE — CONSULTS
[facility] - [facility] Medical Telemetry  General Surgery  Consult Note    Patient Name: Antonio Colon  MRN: [id number]  Code Status: Full Code  Admission Date: 7/20/2025  Hospital Length of Stay: 2 days  Attending Physician: Sandra Baker MD  Primary Care Provider: Bhavana Llanos MD    Patient information was obtained from patient, relative(s), past medical records, ER records, and primary team.     Inpatient consult to General Surgery  Consult performed by: Boyd Mcconnell MD  Consult ordered by: Sandra Baker MD  Reason for consult: Chronic right foot wound        Subjective:     Principal Problem: Sepsis with acute organ dysfunction    History of Present Illness: Patient is admitted to service of hospitalist with diagnosis of sepsis with organ dysfunction.  GI has been consulted and patient is scheduled for EGD today.  General surgery is consulted to evaluate chronic foot wound right foot.  Right foot x-ray noted in ER showed osteopenia but no osteomyelitis.  Wound is noted clean and dry.  That has chronic wound noted to plantar surface of right foot.  Previous transverse amputation noted right foot.  Patient was evaluated by myself and Dr. Mcconnell this morning.  Dr. Mcconnell does not feel right foot wound is source of sepsis.  Clean wound daily with Vashe.  Dressing changes daily as needed.  No surgical intervention recommended at this time.  General surgery will remain available for further assistance with the patient as needed.    No current facility-administered medications on file prior to encounter.     Current Outpatient Medications on File Prior to Encounter   Medication Sig    famotidine (PEPCID) 20 MG tablet Take 20 mg by mouth 2 (two) times daily.    losartan (COZAAR) 50 MG tablet Take 50 mg by mouth once daily.    rosuvastatin (CRESTOR) 5 MG tablet Take 5 mg by mouth every evening.    SEMGLEE,INSULIN GLARG-YFGN, unit/mL (3 mL) InPn Inject 38 Units into the skin every evening.    aspirin  (ECOTRIN) 81 MG EC tablet Take 81 mg by mouth once daily.       Review of patient's allergies indicates:   Allergen Reactions    Nubain [nalbuphine]        Past Medical History:   Diagnosis Date    Anemia     Arthritis     Diabetes mellitus, type 2     HLD (hyperlipidemia)     Hypertension     Immune deficiency disorder     Neuropathy     Peripheral vascular disease      Past Surgical History:   Procedure Laterality Date    APPENDECTOMY      L index finger repair from a cat bite during childhood      Surgical debridement of R foot 8/2020       Family History       Problem Relation (Age of Onset)    Diabetes Mother    Hypertension Father          Tobacco Use    Smoking status: Never    Smokeless tobacco: Never   Substance and Sexual Activity    Alcohol use: Not Currently    Drug use: Never    Sexual activity: Not on file     Review of Systems   Constitutional:  Positive for activity change.   Skin:  Positive for wound (1.5cm in diater dry chronic wound/ callus noted to plantar surface of right foot.).   All other systems reviewed and are negative.    Objective:     Vital Signs (Most Recent):  Temp: 97.7 °F (36.5 °C) (07/23/25 0945)  Pulse: 95 (07/23/25 1251)  Resp: 10 (07/23/25 0922)  BP: 118/81 (07/23/25 0945)  SpO2: 97 % (07/23/25 1251) Vital Signs (24h Range):  Temp:  [97.7 °F (36.5 °C)-98.8 °F (37.1 °C)] 97.7 °F (36.5 °C)  Pulse:  [] 95  Resp:  [10-25] 10  SpO2:  [91 %-100 %] 97 %  BP: ()/(49-86) 118/81     Weight: 81.6 kg (180 lb)  Body mass index is 25.83 kg/m².     Physical Exam  Vitals and nursing note reviewed.   HENT:      Head: Normocephalic.      Nose: Nose normal.      Mouth/Throat:      Mouth: Mucous membranes are moist.   Eyes:      Extraocular Movements: Extraocular movements intact.   Pulmonary:      Effort: Pulmonary effort is normal.   Abdominal:      General: Bowel sounds are normal.      Palpations: Abdomen is soft.   Musculoskeletal:         General: Normal range of motion.       Cervical back: Normal range of motion.   Skin:     General: Skin is warm.      Findings: No bruising or erythema.      Comments: 1.5 cm diameter chronic wound/ callous noted to plantar surface of right foot. No drainage or erythema noted.    Neurological:      General: No focal deficit present.      Mental Status: He is alert.   Psychiatric:         Mood and Affect: Mood normal.            I have reviewed all pertinent lab results within the past 24 hours.  CBC:   Recent Labs   Lab 07/23/25  0432   WBC 10.67   RBC 4.73   HGB 14.3   HCT 41.7      MCV 88.2   MCH 30.2   MCHC 34.3     BMP:   Recent Labs   Lab 07/22/25  0339 07/23/25  0432   GLU 69* 125*    136   K 3.4* 3.5    104   CO2 24 26   BUN 19 14   CREATININE 0.94 1.02   CALCIUM 8.3* 8.4*   MG 2.0  --      CMP:   Recent Labs   Lab 07/23/25  0432   *   CALCIUM 8.4*   ALBUMIN 3.2*   PROT 6.8      K 3.5   CO2 26      BUN 14   CREATININE 1.02   ALKPHOS 95   ALT 33   AST 35   BILITOT 1.2     Microbiology Results (last 7 days)       Procedure Component Value Units Date/Time    Blood culture #1 [[id number]] Collected: 07/21/25 0008    Order Status: Completed Specimen: Blood Updated: 07/23/25 0701     Culture, Blood No Growth At 48 Hours    Blood culture #2 [[id number]] Collected: 07/21/25 0012    Order Status: Completed Specimen: Blood Updated: 07/23/25 0701     Culture, Blood No Growth At 48 Hours    Clostridioides difficile toxin/antigen with reflex to PCR [[id number]]     Order Status: Sent Specimen: Stool           Specimen (24h ago, onward)       Start     Ordered    07/23/25 0845  Surgical Pathology  RELEASE UPON ORDERING         07/23/25 0845                  Recent Labs   Lab 07/21/25  0022   COLORU Colorless   SPECGRAV 1.029   PHUR 5.5   PROTEINUA 30*   NITRITE Negative   LEUKOCYTESUR Negative   UROBILINOGEN Normal       Significant Diagnostics:  I have reviewed all pertinent imaging results/findings within the past 24  hours.    Assessment/Plan:     Wound of right foot  No surgical intervention needed at this time per Dr. Mcconnell.  Continue daily wound care.       VTE Risk Mitigation (From admission, onward)           Ordered     enoxaparin injection 80 mg  Every 12 hours         07/22/25 1155     IP VTE LOW RISK PATIENT  Once         07/21/25 0216                    Thank you for your consult. I will sign off. Please contact us if you have any additional questions.    ASHLEIGH Irizarry  General Surgery  [facility] - [address] [place] [address]

## 2025-07-23 NOTE — ASSESSMENT & PLAN NOTE
ROJAS is likely due to acute tubular necrosis caused by sepsis. Baseline creatinine is 0.8. Most recent creatinine and eGFR are listed below.  Recent Labs     07/20/25 2032 07/22/25  0339   CREATININE 2.06* 0.94   EGFRNORACEVR 35* 89      Plan  - ROJAS is worsening. Will continue current treatment  - Avoid nephrotoxins and renally dose meds for GFR listed above  - Monitor urine output, serial BMP, and adjust therapy as needed  - Continue with IV fluid- 0.9% NS@100 ml/hr

## 2025-07-23 NOTE — SUBJECTIVE & OBJECTIVE
No current facility-administered medications on file prior to encounter.     Current Outpatient Medications on File Prior to Encounter   Medication Sig    famotidine (PEPCID) 20 MG tablet Take 20 mg by mouth 2 (two) times daily.    losartan (COZAAR) 50 MG tablet Take 50 mg by mouth once daily.    rosuvastatin (CRESTOR) 5 MG tablet Take 5 mg by mouth every evening.    SEMGLEE,INSULIN GLARG-YFGN, unit/mL (3 mL) InPn Inject 38 Units into the skin every evening.    aspirin (ECOTRIN) 81 MG EC tablet Take 81 mg by mouth once daily.       Review of patient's allergies indicates:   Allergen Reactions    Nubain [nalbuphine]        Past Medical History:   Diagnosis Date    Anemia     Arthritis     Diabetes mellitus, type 2     HLD (hyperlipidemia)     Hypertension     Immune deficiency disorder     Neuropathy     Peripheral vascular disease      Past Surgical History:   Procedure Laterality Date    APPENDECTOMY      L index finger repair from a cat bite during childhood      Surgical debridement of R foot 8/2020       Family History       Problem Relation (Age of Onset)    Diabetes Mother    Hypertension Father          Tobacco Use    Smoking status: Never    Smokeless tobacco: Never   Substance and Sexual Activity    Alcohol use: Not Currently    Drug use: Never    Sexual activity: Not on file     Review of Systems   Constitutional:  Positive for activity change.   Skin:  Positive for wound (1.5cm in diater dry chronic wound/ callus noted to plantar surface of right foot.).   All other systems reviewed and are negative.    Objective:     Vital Signs (Most Recent):  Temp: 97.7 °F (36.5 °C) (07/23/25 0945)  Pulse: 95 (07/23/25 1251)  Resp: 10 (07/23/25 0922)  BP: 118/81 (07/23/25 0945)  SpO2: 97 % (07/23/25 1251) Vital Signs (24h Range):  Temp:  [97.7 °F (36.5 °C)-98.8 °F (37.1 °C)] 97.7 °F (36.5 °C)  Pulse:  [] 95  Resp:  [10-25] 10  SpO2:  [91 %-100 %] 97 %  BP: ()/(49-86) 118/81     Weight: 81.6 kg (180  lb)  Body mass index is 25.83 kg/m².     Physical Exam  Vitals and nursing note reviewed.   HENT:      Head: Normocephalic.      Nose: Nose normal.      Mouth/Throat:      Mouth: Mucous membranes are moist.   Eyes:      Extraocular Movements: Extraocular movements intact.   Pulmonary:      Effort: Pulmonary effort is normal.   Abdominal:      General: Bowel sounds are normal.      Palpations: Abdomen is soft.   Musculoskeletal:         General: Normal range of motion.      Cervical back: Normal range of motion.   Skin:     General: Skin is warm.      Findings: No bruising or erythema.      Comments: 1.5 cm diameter chronic wound/ callous noted to plantar surface of right foot. No drainage or erythema noted.    Neurological:      General: No focal deficit present.      Mental Status: He is alert.   Psychiatric:         Mood and Affect: Mood normal.            I have reviewed all pertinent lab results within the past 24 hours.  CBC:   Recent Labs   Lab 07/23/25 0432   WBC 10.67   RBC 4.73   HGB 14.3   HCT 41.7      MCV 88.2   MCH 30.2   MCHC 34.3     BMP:   Recent Labs   Lab 07/22/25  0339 07/23/25 0432   GLU 69* 125*    136   K 3.4* 3.5    104   CO2 24 26   BUN 19 14   CREATININE 0.94 1.02   CALCIUM 8.3* 8.4*   MG 2.0  --      CMP:   Recent Labs   Lab 07/23/25 0432   *   CALCIUM 8.4*   ALBUMIN 3.2*   PROT 6.8      K 3.5   CO2 26      BUN 14   CREATININE 1.02   ALKPHOS 95   ALT 33   AST 35   BILITOT 1.2     Microbiology Results (last 7 days)       Procedure Component Value Units Date/Time    Blood culture #1 [[id number]] Collected: 07/21/25 0008    Order Status: Completed Specimen: Blood Updated: 07/23/25 0701     Culture, Blood No Growth At 48 Hours    Blood culture #2 [[id number]] Collected: 07/21/25 0012    Order Status: Completed Specimen: Blood Updated: 07/23/25 0701     Culture, Blood No Growth At 48 Hours    Clostridioides difficile toxin/antigen with reflex to PCR  [[id number]]     Order Status: Sent Specimen: Stool           Specimen (24h ago, onward)       Start     Ordered    07/23/25 0845  Surgical Pathology  RELEASE UPON ORDERING         07/23/25 0845                  Recent Labs   Lab 07/21/25  0022   COLORU Colorless   SPECGRAV 1.029   PHUR 5.5   PROTEINUA 30*   NITRITE Negative   LEUKOCYTESUR Negative   UROBILINOGEN Normal       Significant Diagnostics:  I have reviewed all pertinent imaging results/findings within the past 24 hours.

## 2025-07-23 NOTE — DISCHARGE INSTRUCTIONS
Procedure Date  7/23/25     Impression  Overall Impression:   Performed forceps biopsies in the stomach  Performed forceps biopsies in the esophagus  Dilated 1 step with Scott dilator - (step 1) dilated to 48 Fr  Esophagitis in the lower third of the esophagus; performed cold forceps biopsy  3 cm hiatal hernia  Erythematous mucosa in the fundus of the stomach and antrum; performed cold forceps biopsy  The mid and distal esophagus had circumferential ulceration consistent with severe grade D erosive reflux esophagitis.  Biopsies were obtained in the esophagus was dilated with a 48 French Soctt dilator.  A 3 cm hiatal hernia was noted.  Gastric erythema was present without ulcers and biopsies were obtained from the antrum and fundus.  The pyloric channel was patent.  The mucosa of the duodenal bulb through 3rd portion was normal-appearing.     Recommendation  Await pathology results, due: 7/25/2025      Disposition:  Discharge patient to hospital room in stable condition.  Resume soft diet.  Avoid nonsteroidal anti-inflammatories.  Resume PPI therapy b.i.d..  Follow up biopsy results in 2 days.  Repeat dilation of the esophagus p.r.n..  Use antiemetics as needed.

## 2025-07-23 NOTE — ASSESSMENT & PLAN NOTE
Patient has sepsis with Acute kidney injury secondary to Unknown etiology at this time. A review of systems was completed. Patient's sepsis is improving    Current Antibiotics    vancomycin - pharmacy to dose, pharmacy to manage frequency, Intravenous  piperacillin-tazobactam (ZOSYN) 4.5 g in D5W 100 mL IVPB (MB+), Every 8 hours (non-standard times), Intravenous  vancomycin (VANCOCIN) 1,250 mg in 0.9% NaCl 250 mL IVPB, Every 24 hours (non-standard times), Intravenous    Lactate  Recent Labs   Lab 07/20/25 2041 07/20/25 2052 07/20/25  2318   LACTATE 3.9*  --  1.5   POCLAC  --  4.3*  --      Culture Data  Blood Cultures   Culture, Blood   Date Value Ref Range Status   07/21/2025 No Growth At 48 Hours  Preliminary   07/21/2025 No Growth At 48 Hours  Preliminary   02/01/2024 No Growth at 5 days  Final   02/01/2024 No Growth at 5 days  Final   10/09/2023 No Growth at 5 days  Final   10/09/2023 No Growth at 5 days  Final      Urine Culture   Culture, Urine   Date Value Ref Range Status   02/01/2024 >100,000 Proteus mirabilis (A)  Final      mSOFA  MSOFA Total  Min: 0   Min taken time: 07/23/25 0500  Max: 1   Max taken time: 07/23/25 1401    Plan  - Antibiotics as listed above  - Fluid resuscitation as follows:Actual Body Weight- The patient's actual body weight will be used to calculate the 30 ml/kg fluid bolus.   - Trend lactate to resolution  - Follow up culture data  - Vasopressors were not needed  -Ordered ESR, CRP and Pro calcitonin levels  -Ordered MRSA PCR      7/22  - sepsis workup has been unremarkable at this point so source seems to be the foot wound, will consult surgery tomorrow to see jif any additional recommendations needed    7/23  - seen by surgery who agrees this is not from wound infection  - will continue to follow cultures for now and if tomorrow everything is negative will discontinue abx

## 2025-07-23 NOTE — ANESTHESIA POSTPROCEDURE EVALUATION
Anesthesia Post Evaluation    Patient: Antonio Colon    Procedure(s) Performed: EGD    Final Anesthesia Type: general      Patient location during evaluation: GI PACU  Patient participation: Yes- Able to Participate  Level of consciousness: awake and alert  Post-procedure vital signs: reviewed and stable  Pain management: adequate  Airway patency: patent  WILY mitigation strategies: Multimodal analgesia  PONV status at discharge: No PONV  Anesthetic complications: no      Cardiovascular status: hemodynamically stable and blood pressure returned to baseline  Respiratory status: spontaneous ventilation and room air  Hydration status: euvolemic  Follow-up not needed.  Comments: Refer to nursing note for pain/fidencio score upon discharge              Vitals Value Taken Time   /81 07/23/25 09:45   Temp 36.5 °C (97.7 °F) 07/23/25 09:45   Pulse 96 07/23/25 09:45   Resp 10 07/23/25 09:22   SpO2 91 % 07/23/25 09:45         Event Time   Out of Recovery 07/23/2025 09:22:52         Pain/Fidencio Score: Fidencio Score: 10 (7/23/2025  8:58 AM)

## 2025-07-23 NOTE — ASSESSMENT & PLAN NOTE
Patient has paroxysmal (<7 days) atrial fibrillation. Patient is currently in atrial fibrillation. [id number] Score: 2. The patients heart rate in the last 24 hours is as follows:  Pulse  Min: 82  Max: 104     Antiarrhythmics  metoprolol succinate (TOPROL-XL) 24 hr tablet 50 mg, Daily, Oral    Anticoagulants  enoxaparin injection 80 mg, Every 12 hours, Subcutaneous    Plan  - Replete lytes with a goal of K>4, Mg >2  - Patient is anticoagulated, see medications listed above.  - Patient's afib is currently controlled    7/22  - patient developed new onset afib this morning after eating, rates in 150s, given metoprolol 10 mg IV once with rates improved to 100s. Patient denies any cardiac history or recent chest pain/sob/heart palpitations. Given that afib does not seem related to the sepsis at this time will work up as new onset, consult cardio, tsh/echo/trop ordered, tele monitor in place     7/23  - cardiology recs noted

## 2025-07-23 NOTE — SUBJECTIVE & OBJECTIVE
Interval History:     Review of Systems   All other systems reviewed and are negative.    Objective:     Vital Signs (Most Recent):  Temp: 97.7 °F (36.5 °C) (07/23/25 0945)  Pulse: 95 (07/23/25 1251)  Resp: 10 (07/23/25 0922)  BP: 118/81 (07/23/25 0945)  SpO2: 97 % (07/23/25 1251) Vital Signs (24h Range):  Temp:  [97.7 °F (36.5 °C)-98.8 °F (37.1 °C)] 97.7 °F (36.5 °C)  Pulse:  [] 95  Resp:  [10-25] 10  SpO2:  [91 %-100 %] 97 %  BP: ()/(49-86) 118/81     Weight: 81.6 kg (180 lb)  Body mass index is 25.83 kg/m².    Intake/Output Summary (Last 24 hours) at 7/23/2025 1504  Last data filed at 7/23/2025 0000  Gross per 24 hour   Intake 340 ml   Output 800 ml   Net -460 ml         Physical Exam  Vitals and nursing note reviewed.   Constitutional:       Appearance: He is ill-appearing.   HENT:      Head: Normocephalic.      Mouth/Throat:      Mouth: Mucous membranes are moist.   Eyes:      Pupils: Pupils are equal, round, and reactive to light.   Neck:      Vascular: No carotid bruit.   Cardiovascular:      Rate and Rhythm: Tachycardia present. Rhythm irregular.      Heart sounds: No murmur heard.  Pulmonary:      Effort: Pulmonary effort is normal.      Breath sounds: Normal breath sounds.   Abdominal:      General: Abdomen is flat. Bowel sounds are normal. There is no distension.      Palpations: Abdomen is soft.   Musculoskeletal:      Cervical back: Normal range of motion. No rigidity or tenderness.      Right lower leg: No edema.      Left lower leg: No edema.      Comments: See media for left foot wound   Lymphadenopathy:      Cervical: No cervical adenopathy.   Skin:     General: Skin is warm and dry.   Neurological:      General: No focal deficit present.      Mental Status: He is alert. Mental status is at baseline.   Psychiatric:         Mood and Affect: Mood normal.               Significant Labs: All pertinent labs within the past 24 hours have been reviewed.    Significant Imaging: I have reviewed  all pertinent imaging results/findings within the past 24 hours.

## 2025-07-23 NOTE — ASSESSMENT & PLAN NOTE
7/22  - continues with trouble swallowing solids more than liquids that has worsened over the last week and after reviewing ct findings will consult GI and continue PPI therapy for now    7/23  - EGD today, recs noted

## 2025-07-23 NOTE — ASSESSMENT & PLAN NOTE
ROJAS is likely due to acute tubular necrosis caused by sepsis. Baseline creatinine is 0.8. Most recent creatinine and eGFR are listed below.  Recent Labs     07/20/25  2032 07/22/25  0339 07/23/25  0432   CREATININE 2.06* 0.94 1.02   EGFRNORACEVR 35* 89 81      Plan  - ROJAS is worsening. Will continue current treatment  - Avoid nephrotoxins and renally dose meds for GFR listed above  - Monitor urine output, serial BMP, and adjust therapy as needed  - Continue with IV fluid- 0.9% NS@100 ml/hr

## 2025-07-23 NOTE — ASSESSMENT & PLAN NOTE
Patient's blood pressure range in the last 24 hours was: BP  Min: 80/50  Max: 127/72.The patient's inpatient anti-hypertensive regimen is listed below:  Current Antihypertensives  Patient in Losartan 50mg Po once daily at home.     Plan  - BP is uncontrolled, will adjust as follows: Considering ROJAS, will hold Losartan. Started on Amlodipine 10mg PO once daily  - Monitor BP/HR

## 2025-07-23 NOTE — H&P
[facility]  Gastroenterology  H&P    Patient Name: Antonio Colon  MRN: [id number]  Admission Date: 7/20/2025  Code Status: Full Code    Attending Provider: Sandra Baker MD   Primary Care Physician: Bhavana Llanos MD  Principal Problem:Sepsis with acute organ dysfunction    Subjective:     History of Present Illness:  This patient is a 69-year-old man who has had vomiting recently.  He complains of esophageal dysphagia.  His CT does show distal esophageal thickening.  He presents for EGD with possible dilation of the esophagus    Past Medical History:   Diagnosis Date    Anemia     Arthritis     Diabetes mellitus, type 2     HLD (hyperlipidemia)     Hypertension     Immune deficiency disorder     Neuropathy     Peripheral vascular disease        Past Surgical History:   Procedure Laterality Date    APPENDECTOMY      L index finger repair from a cat bite during childhood      Surgical debridement of R foot 8/2020         Review of patient's allergies indicates:   Allergen Reactions    Nubain [nalbuphine]      Family History       Problem Relation (Age of Onset)    Diabetes Mother    Hypertension Father          Tobacco Use    Smoking status: Never    Smokeless tobacco: Never   Substance and Sexual Activity    Alcohol use: Not Currently    Drug use: Never    Sexual activity: Not on file     Review of Systems   Constitutional:  Positive for activity change.   HENT:  Positive for trouble swallowing.    Gastrointestinal:  Positive for nausea and vomiting.     Objective:     Vital Signs (Most Recent):  Temp: 98.2 °F (36.8 °C) (07/23/25 0828)  Pulse: 94 (07/23/25 0828)  Resp: (!) 25 (07/23/25 0828)  BP: 127/72 (07/23/25 0828)  SpO2: 98 % (07/23/25 0828) Vital Signs (24h Range):  Temp:  [97.8 °F (36.6 °C)-98.8 °F (37.1 °C)] 98.2 °F (36.8 °C)  Pulse:  [] 94  Resp:  [17-25] 25  SpO2:  [93 %-98 %] 98 %  BP: (111-127)/(72-86) 127/72     Weight: 81.6 kg (180 lb) (07/23/25 0828)  Body mass index is 25.83  kg/m².      Intake/Output Summary (Last 24 hours) at 7/23/2025 0833  Last data filed at 7/23/2025 0000  Gross per 24 hour   Intake 340 ml   Output 2400 ml   Net -2060 ml       Lines/Drains/Airways       Peripheral Intravenous Line  Duration             Peripheral IV 07/22/25 0900 20 G Left;Posterior Hand <1 day                    Physical Exam  Vitals reviewed.   Constitutional:       General: He is not in acute distress.     Appearance: Normal appearance. He is well-developed. He is not ill-appearing.   HENT:      Head: Normocephalic and atraumatic.      Nose: Nose normal.   Eyes:      Pupils: Pupils are equal, round, and reactive to light.   Cardiovascular:      Rate and Rhythm: Normal rate and regular rhythm.   Pulmonary:      Effort: Pulmonary effort is normal.      Breath sounds: Normal breath sounds. No wheezing.   Abdominal:      General: Abdomen is flat. Bowel sounds are normal. There is no distension.      Palpations: Abdomen is soft.      Tenderness: There is no abdominal tenderness. There is no guarding.   Skin:     General: Skin is warm and dry.      Coloration: Skin is not jaundiced.   Neurological:      Mental Status: He is alert.   Psychiatric:         Attention and Perception: Attention normal.         Mood and Affect: Affect normal.         Speech: Speech normal.         Behavior: Behavior is cooperative.      Comments: Pt was calm while speaking.         Significant Labs:  CBC:   Recent Labs   Lab 07/22/25  0339 07/23/25  0432   WBC 12.52* 10.67   HGB 13.1* 14.3   HCT 39.2* 41.7    271     CMP:   Recent Labs   Lab 07/23/25  0432   *   CALCIUM 8.4*   ALBUMIN 3.2*   PROT 6.8      K 3.5   CO2 26      BUN 14   CREATININE 1.02   ALKPHOS 95   ALT 33   AST 35   BILITOT 1.2       Significant Imaging:  Imaging results within the past 24 hours have been reviewed.    Assessment/Plan:     Active Diagnoses:    Diagnosis Date Noted POA    PRINCIPAL PROBLEM:  Sepsis with acute organ  dysfunction [A41.9, R65.20] 07/21/2025 Yes    New onset a-fib [I48.91] 07/22/2025 No    New onset of congestive heart failure [I50.9] 07/22/2025 Yes    Esophageal dysphagia [R13.19] 07/22/2025 Yes    Nausea and vomiting [R11.2] 07/22/2025 Yes    ROJAS (acute kidney injury) [N17.9] 07/21/2025 Yes    Benign essential HTN [I10] 07/21/2025 Yes    Type 2 diabetes mellitus, with long-term current use of insulin [E11.9, Z79.4] 07/21/2025 Not Applicable    Hyperlipidemia [E78.5] 07/21/2025 Yes    Cannabis dependence [F12.20] 07/21/2025 Yes      Problems Resolved During this Admission:         Impression: Esophageal dysphagia, abnormal CT of the esophagus  Plan: EGD with dilation of the esophagus    Chuy Bills MD  Gastroenterology  [facility]

## 2025-07-23 NOTE — HOSPITAL COURSE
7/22  - patient developed new onset afib this morning after eating, rates in 150s, given metoprolol 10 mg IV once with rates improved to 100s. Patient denies any cardiac history or recent chest pain/sob/heart palpitations. Given that afib does not seem related to the sepsis at this time will work up as new onset, consult cardio, tsh/echo/trop ordered, tele monitor in place   - continues with trouble swallowing solids more than liquids that has worsened over the last week and after reviewing ct findings will consult GI and continue PPI therapy for now  - sepsis workup has been unremarkable at this point so source seems to be the foot wound, will consult surgery tomorrow to see jif any additional recommendations needed    7/23  - EGD today, recs noted  - seen by surgery who agrees this is not from wound infection  - cardiology recs noted  - will continue to follow cultures for now and if tomorrow everything is negative will discontinue abx    7/24  - planned to discharge patient today however during exam patient was ambulating to bed from bathroom and complained of squeezing chest pain, heart racing, and visible shortness of breath. He also stated this happened this morning during breakfast as well.  - will keep overnight to monitor given new onset afib and CHF, there was a discussion of possible ischemic eval outpatient, if continues will re-consult surgery.  - troponin ordered    7/25  - continues with some trouble swallowing, can tolerate soft diet well, advised to continue soft diet for now and advance as tolerated at home, continue protonix twice a day as recommended by GI, follow with pcp in 1 week  - new systolic heart failure: metoprolol 100 mg daily, losartan 25 mg daily (down titrated to tolerate metoprolol which is needed for afib control), should start jardiance and spironolactone outpatient as tolerated  - continue doxy and cefdinir for 5 days for sepsis  - ok to discharge today

## 2025-07-23 NOTE — PLAN OF CARE
Problem: Adult Inpatient Plan of Care  Goal: Readiness for Transition of Care  Outcome: Progressing     Problem: Acute Kidney Injury/Impairment  Goal: Fluid and Electrolyte Balance  Outcome: Progressing  Goal: Effective Renal Function  Outcome: Progressing     Problem: Wound  Goal: Optimal Functional Ability  Outcome: Progressing     Problem: Hospitalized Older Adult  Goal: Fluid and Electrolyte Balance  Outcome: Progressing

## 2025-07-23 NOTE — ASSESSMENT & PLAN NOTE
Patient's blood pressure range in the last 24 hours was: BP  Min: 111/74  Max: 198/104.The patient's inpatient anti-hypertensive regimen is listed below:  Current Antihypertensives  Patient in Losartan 50mg Po once daily at home.     Plan  - BP is uncontrolled, will adjust as follows: Considering ROJAS, will hold Losartan. Started on Amlodipine 10mg PO once daily  - Monitor BP/HR

## 2025-07-23 NOTE — SUBJECTIVE & OBJECTIVE
Interval History:     Review of Systems   All other systems reviewed and are negative.    Objective:     Vital Signs (Most Recent):  Temp: 98.7 °F (37.1 °C) (07/22/25 2015)  Pulse: 104 (07/22/25 2015)  Resp: 18 (07/22/25 2015)  BP: 111/74 (07/22/25 2015)  SpO2: 96 % (07/22/25 2015) Vital Signs (24h Range):  Temp:  [97.5 °F (36.4 °C)-98.7 °F (37.1 °C)] 98.7 °F (37.1 °C)  Pulse:  [] 104  Resp:  [18] 18  SpO2:  [96 %-99 %] 96 %  BP: (111-198)/() 111/74     Weight: 81.7 kg (180 lb 1.9 oz)  Body mass index is 25.84 kg/m².    Intake/Output Summary (Last 24 hours) at 7/22/2025 2142  Last data filed at 7/22/2025 1430  Gross per 24 hour   Intake 120 ml   Output 1600 ml   Net -1480 ml         Physical Exam  Vitals and nursing note reviewed.   Constitutional:       Appearance: He is ill-appearing.   HENT:      Head: Normocephalic.      Mouth/Throat:      Mouth: Mucous membranes are moist.   Eyes:      Pupils: Pupils are equal, round, and reactive to light.   Neck:      Vascular: No carotid bruit.   Cardiovascular:      Rate and Rhythm: Tachycardia present. Rhythm irregular.      Heart sounds: No murmur heard.  Pulmonary:      Effort: Pulmonary effort is normal.      Breath sounds: Normal breath sounds.   Abdominal:      General: Abdomen is flat. Bowel sounds are normal. There is no distension.      Palpations: Abdomen is soft.   Musculoskeletal:      Cervical back: Normal range of motion. No rigidity or tenderness.      Right lower leg: No edema.      Left lower leg: No edema.      Comments: See media for left foot wound   Lymphadenopathy:      Cervical: No cervical adenopathy.   Skin:     General: Skin is warm and dry.   Neurological:      General: No focal deficit present.      Mental Status: He is alert. Mental status is at baseline.   Psychiatric:         Mood and Affect: Mood normal.               Significant Labs: All pertinent labs within the past 24 hours have been reviewed.    Significant Imaging: I have  reviewed all pertinent imaging results/findings within the past 24 hours.

## 2025-07-23 NOTE — ASSESSMENT & PLAN NOTE
"Patient's FSGs are uncontrolled due to hyperglycemia on current medication regimen.  Last A1c reviewed-   Lab Results   Component Value Date    HGBA1C 9.3 (H) 07/21/2025     Most recent fingerstick glucose reviewed-   No results for input(s): "POCTGLUCOSE" in the last 24 hours.    Current correctional scale  Medium  Maintain anti-hyperglycemic dose as follows-   Antihyperglycemics (From admission, onward)      Start     Stop Route Frequency Ordered    07/21/25 0330  insulin glargine U-100 (Lantus) injection 20 Units         -- SubQ Nightly 07/21/25 0216    07/21/25 0315  insulin aspart U-100 injection 0-10 Units         -- SubQ Before meals & nightly PRN 07/21/25 0216          Hold Oral hypoglycemics while patient is in the hospital.    HBA1C ordered  "

## 2025-07-23 NOTE — ASSESSMENT & PLAN NOTE
Patient has paroxysmal (<7 days) atrial fibrillation. Patient is currently in atrial fibrillation. [id number] Score: 2. The patients heart rate in the last 24 hours is as follows:  Pulse  Min: 77  Max: 156     Antiarrhythmics  metoprolol succinate (TOPROL-XL) 24 hr tablet 50 mg, Daily, Oral    Anticoagulants  enoxaparin injection 80 mg, Every 12 hours, Subcutaneous    Plan  - Replete lytes with a goal of K>4, Mg >2  - Patient is anticoagulated, see medications listed above.  - Patient's afib is currently controlled    7/22  - patient developed new onset afib this morning after eating, rates in 150s, given metoprolol 10 mg IV once with rates improved to 100s. Patient denies any cardiac history or recent chest pain/sob/heart palpitations. Given that afib does not seem related to the sepsis at this time will work up as new onset, consult cardio, tsh/echo/trop ordered, tele monitor in place

## 2025-07-23 NOTE — HPI
Patient is admitted to service of hospitalist with diagnosis of sepsis with organ dysfunction.  GI has been consulted and patient is scheduled for EGD today.  General surgery is consulted to evaluate chronic foot wound right foot.  Right foot x-ray noted in ER showed osteopenia but no osteomyelitis.  Wound is noted clean and dry.  That has chronic wound noted to plantar surface of right foot.  Previous transverse amputation noted right foot.  Patient was evaluated by myself and Dr. Mcconnell this morning.  Dr. Mcconnell does not feel right foot wound is source of sepsis.  Clean wound daily with Vashe.  Dressing changes daily as needed.  No surgical intervention recommended at this time.  General surgery will remain available for further assistance with the patient as needed.

## 2025-07-23 NOTE — ASSESSMENT & PLAN NOTE
Echo  Result Date: 7/22/2025    Left Ventricle: The left ventricle is normal in size. Left ventricle   fractional shortening is 22.9%. Normal wall thickness. There is concentric   remodeling. Moderate global hypokinesis present. There is moderately   reduced systolic function with a visually estimated ejection fraction of   30 - 40%. Unable to assess diastolic function due to atrial fibrillation.    Right Ventricle: The right ventricle is normal in size measuring 3.3   cm. Systolic function is reduced.    Left Atrium: The left atrium is dilated.    Mitral Valve: There is mild regurgitation.    Pulmonary Artery: The estimated pulmonary artery systolic pressure is   12 mmHg.    IVC/SVC: Normal venous pressure at 3 mmHg.

## 2025-07-23 NOTE — PROGRESS NOTES
[facility] - [address] [place] [address] Medicine  Progress Note    Patient Name: Antonio Colon  MRN: [id number]  Patient Class: IP- Inpatient   Admission Date: 7/20/2025  Length of Stay: 2 days  Attending Physician: Sandra Baker MD  Primary Care Provider: Bhavana Llanos MD        Subjective     Principal Problem:Sepsis with acute organ dysfunction        HPI:  The patient is 69 yr old male with past medical hx of Essential HTN, Type 2 DM( is on insulin), HLD, PVD presented to [facility] with complain of abdominal pain, nausea and vomiting since Friday afternoon. The pain was localized in the umbilical region and described as sore rather than severe. The patient had not eaten since the onset of symptoms and experienced vomiting multiple times since Friday. The patient reported experiencing temperature fluctuations, feeling cold one minute and hot the next, accompanied by sweating. Bowel movements were reported as normal without diarrhea or constipation. Denies fever, chest pain, SOB, palpitation.  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    On initial presentation at ED, patient's vital signs were: /76, ,  Respiratory rate 20, O2 saturation 96% , Afebrile    Workup was notable for WBC 22.6, Hemoglobin 15.2, Hematocrit 43.3, Sodium 128, Potassium 4.5, Chloride 94, Creatinine 2.06 ( baseline 0.8), GFR 35, Glucose 453, BUN 39, AST 56, Total Bilirubin 1.2, ALT 35, Amylase 14.9, Lactic Acid 3.9 (improved to 1.5 after 1 liter NS), Magnesium 1.9  Blood culture pending   Urine drug screen positive for cannabinoid and opiates   Urinalysis: trace ketones, blood present, WBC <1    CT abdomen and pelvis: not significant.   X ray Right foot-Pending result    Patient received  2 L IV NS, Zosyn 4.5 gm IV once and Ondansetron 4mg IV once at ED    Patient will be admitted for further evaluation  and intervention for management of Sepsis.       Overview/Hospital Course:  7/22  - patient developed new onset afib this morning after eating, rates in 150s, given metoprolol 10 mg IV once with rates improved to 100s. Patient denies any cardiac history or recent chest pain/sob/heart palpitations. Given that afib does not seem related to the sepsis at this time will work up as new onset, consult cardio, tsh/echo/trop ordered, tele monitor in place   - continues with trouble swallowing solids more than liquids that has worsened over the last week and after reviewing ct findings will consult GI and continue PPI therapy for now  - sepsis workup has been unremarkable at this point so source seems to be the foot wound, will consult surgery tomorrow to see jif any additional recommendations needed    7/23  - EGD today, recs noted  - seen by surgery who agrees this is not from wound infection  - cardiology recs noted  - will continue to follow cultures for now and if tomorrow everything is negative will discontinue abx    Interval History:     Review of Systems   All other systems reviewed and are negative.    Objective:     Vital Signs (Most Recent):  Temp: 97.7 °F (36.5 °C) (07/23/25 0945)  Pulse: 95 (07/23/25 1251)  Resp: 10 (07/23/25 0922)  BP: 118/81 (07/23/25 0945)  SpO2: 97 % (07/23/25 1251) Vital Signs (24h Range):  Temp:  [97.7 °F (36.5 °C)-98.8 °F (37.1 °C)] 97.7 °F (36.5 °C)  Pulse:  [] 95  Resp:  [10-25] 10  SpO2:  [91 %-100 %] 97 %  BP: ()/(49-86) 118/81     Weight: 81.6 kg (180 lb)  Body mass index is 25.83 kg/m².    Intake/Output Summary (Last 24 hours) at 7/23/2025 1504  Last data filed at 7/23/2025 0000  Gross per 24 hour   Intake 340 ml   Output 800 ml   Net -460 ml         Physical Exam  Vitals and nursing note reviewed.   Constitutional:       Appearance: He is ill-appearing.   HENT:      Head: Normocephalic.      Mouth/Throat:      Mouth: Mucous membranes are moist.   Eyes:      Pupils:  Pupils are equal, round, and reactive to light.   Neck:      Vascular: No carotid bruit.   Cardiovascular:      Rate and Rhythm: Tachycardia present. Rhythm irregular.      Heart sounds: No murmur heard.  Pulmonary:      Effort: Pulmonary effort is normal.      Breath sounds: Normal breath sounds.   Abdominal:      General: Abdomen is flat. Bowel sounds are normal. There is no distension.      Palpations: Abdomen is soft.   Musculoskeletal:      Cervical back: Normal range of motion. No rigidity or tenderness.      Right lower leg: No edema.      Left lower leg: No edema.      Comments: See media for left foot wound   Lymphadenopathy:      Cervical: No cervical adenopathy.   Skin:     General: Skin is warm and dry.   Neurological:      General: No focal deficit present.      Mental Status: He is alert. Mental status is at baseline.   Psychiatric:         Mood and Affect: Mood normal.               Significant Labs: All pertinent labs within the past 24 hours have been reviewed.    Significant Imaging: I have reviewed all pertinent imaging results/findings within the past 24 hours.      Assessment & Plan  Sepsis with acute organ dysfunction  Patient has sepsis with Acute kidney injury secondary to Unknown etiology at this time. A review of systems was completed. Patient's sepsis is improving    Current Antibiotics    vancomycin - pharmacy to dose, pharmacy to manage frequency, Intravenous  piperacillin-tazobactam (ZOSYN) 4.5 g in D5W 100 mL IVPB (MB+), Every 8 hours (non-standard times), Intravenous  vancomycin (VANCOCIN) 1,250 mg in 0.9% NaCl 250 mL IVPB, Every 24 hours (non-standard times), Intravenous    Lactate  Recent Labs   Lab 07/20/25 2041 07/20/25 2052 07/20/25  2318   LACTATE 3.9*  --  1.5   POCLAC  --  4.3*  --      Culture Data  Blood Cultures   Culture, Blood   Date Value Ref Range Status   07/21/2025 No Growth At 48 Hours  Preliminary   07/21/2025 No Growth At 48 Hours  Preliminary   02/01/2024 No  Growth at 5 days  Final   02/01/2024 No Growth at 5 days  Final   10/09/2023 No Growth at 5 days  Final   10/09/2023 No Growth at 5 days  Final      Urine Culture   Culture, Urine   Date Value Ref Range Status   02/01/2024 >100,000 Proteus mirabilis (A)  Final      mSOFA  MSOFA Total  Min: 0   Min taken time: 07/23/25 0500  Max: 1   Max taken time: 07/23/25 1401    Plan  - Antibiotics as listed above  - Fluid resuscitation as follows:Actual Body Weight- The patient's actual body weight will be used to calculate the 30 ml/kg fluid bolus.   - Trend lactate to resolution  - Follow up culture data  - Vasopressors were not needed  -Ordered ESR, CRP and Pro calcitonin levels  -Ordered MRSA PCR      7/22  - sepsis workup has been unremarkable at this point so source seems to be the foot wound, will consult surgery tomorrow to see jif any additional recommendations needed    7/23  - seen by surgery who agrees this is not from wound infection  - will continue to follow cultures for now and if tomorrow everything is negative will discontinue abx  ROJAS (acute kidney injury)  ROJAS is likely due to acute tubular necrosis caused by sepsis. Baseline creatinine is 0.8. Most recent creatinine and eGFR are listed below.  Recent Labs     07/20/25  2032 07/22/25  0339 07/23/25  0432   CREATININE 2.06* 0.94 1.02   EGFRNORACEVR 35* 89 81      Plan  - ROJAS is worsening. Will continue current treatment  - Avoid nephrotoxins and renally dose meds for GFR listed above  - Monitor urine output, serial BMP, and adjust therapy as needed  - Continue with IV fluid- 0.9% NS@100 ml/hr  Benign essential HTN  Patient's blood pressure range in the last 24 hours was: BP  Min: 80/50  Max: 127/72.The patient's inpatient anti-hypertensive regimen is listed below:  Current Antihypertensives  Patient in Losartan 50mg Po once daily at home.     Plan  - BP is uncontrolled, will adjust as follows: Considering ROJAS, will hold Losartan. Started on Amlodipine 10mg PO  "once daily  - Monitor BP/HR  Type 2 diabetes mellitus, with long-term current use of insulin  Patient's FSGs are uncontrolled due to hyperglycemia on current medication regimen.  Last A1c reviewed-   Lab Results   Component Value Date    HGBA1C 9.3 (H) 07/21/2025     Most recent fingerstick glucose reviewed-   No results for input(s): "POCTGLUCOSE" in the last 24 hours.    Current correctional scale  Medium  Maintain anti-hyperglycemic dose as follows-   Antihyperglycemics (From admission, onward)      Start     Stop Route Frequency Ordered    07/21/25 0330  insulin glargine U-100 (Lantus) injection 20 Units         -- SubQ Nightly 07/21/25 0216    07/21/25 0315  insulin aspart U-100 injection 0-10 Units         -- SubQ Before meals & nightly PRN 07/21/25 0216          Hold Oral hypoglycemics while patient is in the hospital.    HBA1C ordered  Hyperlipidemia  Patient is on Crestor 5mg PO once daily  Will change to Atorvastatin 20mg PO Once daily during hospitalization.    Cannabis dependence  Urine analysis positive for Cannabis  Monitor symptoms    New onset a-fib  Patient has paroxysmal (<7 days) atrial fibrillation. Patient is currently in atrial fibrillation. [id number] Score: 2. The patients heart rate in the last 24 hours is as follows:  Pulse  Min: 82  Max: 104     Antiarrhythmics  metoprolol succinate (TOPROL-XL) 24 hr tablet 50 mg, Daily, Oral    Anticoagulants  enoxaparin injection 80 mg, Every 12 hours, Subcutaneous    Plan  - Replete lytes with a goal of K>4, Mg >2  - Patient is anticoagulated, see medications listed above.  - Patient's afib is currently controlled    7/22  - patient developed new onset afib this morning after eating, rates in 150s, given metoprolol 10 mg IV once with rates improved to 100s. Patient denies any cardiac history or recent chest pain/sob/heart palpitations. Given that afib does not seem related to the sepsis at this time will work up as new onset, consult cardio, " tsh/echo/trop ordered, tele monitor in place     7/23  - cardiology recs noted  New onset of congestive heart failure  Echo  Result Date: 7/22/2025    Left Ventricle: The left ventricle is normal in size. Left ventricle   fractional shortening is 22.9%. Normal wall thickness. There is concentric   remodeling. Moderate global hypokinesis present. There is moderately   reduced systolic function with a visually estimated ejection fraction of   30 - 40%. Unable to assess diastolic function due to atrial fibrillation.    Right Ventricle: The right ventricle is normal in size measuring 3.3   cm. Systolic function is reduced.    Left Atrium: The left atrium is dilated.    Mitral Valve: There is mild regurgitation.    Pulmonary Artery: The estimated pulmonary artery systolic pressure is   12 mmHg.    IVC/SVC: Normal venous pressure at 3 mmHg.                 Esophageal dysphagia  7/22  - continues with trouble swallowing solids more than liquids that has worsened over the last week and after reviewing ct findings will consult GI and continue PPI therapy for now    7/23  - EGD today, recs noted    Nausea and vomiting      Gastroesophageal reflux disease with esophagitis without hemorrhage      HH (hiatus hernia)      Gastritis without bleeding      Open wound of right foot      VTE Risk Mitigation (From admission, onward)           Ordered     enoxaparin injection 80 mg  Every 12 hours         07/22/25 1155     IP VTE LOW RISK PATIENT  Once         07/21/25 0216                    Discharge Planning   NIVIA: 7/28/2025     Code Status: Full Code   Medical Readiness for Discharge Date:   Discharge Plan A: Home                        Sandra Baker MD  Department of Hospital Medicine   [facility] - [facility]

## 2025-07-23 NOTE — TRANSFER OF CARE
"Anesthesia Transfer of Care Note    Patient: Antonio Colon    Procedure(s) Performed: EGD    Patient location: GI    Anesthesia Type: general    Transport from OR: Transported from OR on room air with adequate spontaneous ventilation. Continuous ECG monitoring in transport. Continuous SpO2 monitoring in transport    Post pain: adequate analgesia    Post assessment: no apparent anesthetic complications    Post vital signs: stable    Level of consciousness: responds to stimulation, awake and sedated    Nausea/Vomiting: no nausea/vomiting    Complications: none    Transfer of care protocol was followedComments: No stimulation with scope insertion       Last vitals: Visit Vitals  /72 (BP Location: Right arm, Patient Position: Lying)   Pulse 94   Temp 36.8 °C (98.2 °F) (Oral)   Resp (!) 25   Ht 5' 10" (1.778 m)   Wt 81.6 kg (180 lb)   SpO2 98%   BMI 25.83 kg/m²     "

## 2025-07-23 NOTE — PROGRESS NOTES
[facility] - [address] [place] [address] Medicine  Progress Note    Patient Name: Antonio Colon  MRN: [id number]  Patient Class: IP- Inpatient   Admission Date: 7/20/2025  Length of Stay: 1 days  Attending Physician: Sandra Baker MD  Primary Care Provider: Bhavana Llanos MD        Subjective     Principal Problem:Sepsis with acute organ dysfunction        HPI:  The patient is 69 yr old male with past medical hx of Essential HTN, Type 2 DM( is on insulin), HLD, PVD presented to [facility] with complain of abdominal pain, nausea and vomiting since Friday afternoon. The pain was localized in the umbilical region and described as sore rather than severe. The patient had not eaten since the onset of symptoms and experienced vomiting multiple times since Friday. The patient reported experiencing temperature fluctuations, feeling cold one minute and hot the next, accompanied by sweating. Bowel movements were reported as normal without diarrhea or constipation. Denies fever, chest pain, SOB, palpitation.  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    On initial presentation at ED, patient's vital signs were: /76, ,  Respiratory rate 20, O2 saturation 96% , Afebrile    Workup was notable for WBC 22.6, Hemoglobin 15.2, Hematocrit 43.3, Sodium 128, Potassium 4.5, Chloride 94, Creatinine 2.06 ( baseline 0.8), GFR 35, Glucose 453, BUN 39, AST 56, Total Bilirubin 1.2, ALT 35, Amylase 14.9, Lactic Acid 3.9 (improved to 1.5 after 1 liter NS), Magnesium 1.9  Blood culture pending   Urine drug screen positive for cannabinoid and opiates   Urinalysis: trace ketones, blood present, WBC <1    CT abdomen and pelvis: not significant.   X ray Right foot-Pending result    Patient received  2 L IV NS, Zosyn 4.5 gm IV once and Ondansetron 4mg IV once at ED    Patient will be admitted for further evaluation  and intervention for management of Sepsis.       Overview/Hospital Course:  7/22  - patient developed new onset afib this morning after eating, rates in 150s, given metoprolol 10 mg IV once with rates improved to 100s. Patient denies any cardiac history or recent chest pain/sob/heart palpitations. Given that afib does not seem related to the sepsis at this time will work up as new onset, consult cardio, tsh/echo/trop ordered, tele monitor in place   - continues with trouble swallowing solids more than liquids that has worsened over the last week and after reviewing ct findings will consult GI and continue PPI therapy for now  - sepsis workup has been unremarkable at this point so source seems to be the foot wound, will consult surgery tomorrow to see jif any additional recommendations needed    Interval History:     Review of Systems   All other systems reviewed and are negative.    Objective:     Vital Signs (Most Recent):  Temp: 98.7 °F (37.1 °C) (07/22/25 2015)  Pulse: 104 (07/22/25 2015)  Resp: 18 (07/22/25 2015)  BP: 111/74 (07/22/25 2015)  SpO2: 96 % (07/22/25 2015) Vital Signs (24h Range):  Temp:  [97.5 °F (36.4 °C)-98.7 °F (37.1 °C)] 98.7 °F (37.1 °C)  Pulse:  [] 104  Resp:  [18] 18  SpO2:  [96 %-99 %] 96 %  BP: (111-198)/() 111/74     Weight: 81.7 kg (180 lb 1.9 oz)  Body mass index is 25.84 kg/m².    Intake/Output Summary (Last 24 hours) at 7/22/2025 2142  Last data filed at 7/22/2025 1430  Gross per 24 hour   Intake 120 ml   Output 1600 ml   Net -1480 ml         Physical Exam  Vitals and nursing note reviewed.   Constitutional:       Appearance: He is ill-appearing.   HENT:      Head: Normocephalic.      Mouth/Throat:      Mouth: Mucous membranes are moist.   Eyes:      Pupils: Pupils are equal, round, and reactive to light.   Neck:      Vascular: No carotid bruit.   Cardiovascular:      Rate and Rhythm: Tachycardia present. Rhythm irregular.      Heart sounds: No murmur heard.  Pulmonary:       Effort: Pulmonary effort is normal.      Breath sounds: Normal breath sounds.   Abdominal:      General: Abdomen is flat. Bowel sounds are normal. There is no distension.      Palpations: Abdomen is soft.   Musculoskeletal:      Cervical back: Normal range of motion. No rigidity or tenderness.      Right lower leg: No edema.      Left lower leg: No edema.      Comments: See media for left foot wound   Lymphadenopathy:      Cervical: No cervical adenopathy.   Skin:     General: Skin is warm and dry.   Neurological:      General: No focal deficit present.      Mental Status: He is alert. Mental status is at baseline.   Psychiatric:         Mood and Affect: Mood normal.               Significant Labs: All pertinent labs within the past 24 hours have been reviewed.    Significant Imaging: I have reviewed all pertinent imaging results/findings within the past 24 hours.      Assessment & Plan  Sepsis with acute organ dysfunction  Patient has sepsis with Acute kidney injury secondary to Unknown etiology at this time. A review of systems was completed. Patient's sepsis is improving    Current Antibiotics    vancomycin - pharmacy to dose, pharmacy to manage frequency, Intravenous  piperacillin-tazobactam (ZOSYN) 4.5 g in D5W 100 mL IVPB (MB+), Every 8 hours (non-standard times), Intravenous  vancomycin (VANCOCIN) 1,250 mg in 0.9% NaCl 250 mL IVPB, Every 24 hours (non-standard times), Intravenous    Lactate  Recent Labs   Lab 07/20/25 2041 07/20/25 2052 07/20/25  2318   LACTATE 3.9*  --  1.5   POCLAC  --  4.3*  --      Culture Data  Blood Cultures   Culture, Blood   Date Value Ref Range Status   07/21/2025 No Growth At 24 Hours  Preliminary   07/21/2025 No Growth At 24 Hours  Preliminary   02/01/2024 No Growth at 5 days  Final   02/01/2024 No Growth at 5 days  Final   10/09/2023 No Growth at 5 days  Final   10/09/2023 No Growth at 5 days  Final      Urine Culture   Culture, Urine   Date Value Ref Range Status   02/01/2024  ">100,000 Proteus mirabilis (A)  Final      mSOFA  MSOFA Total  Min: 0   Min taken time: 07/22/25 2100  Max: 3   Max taken time: 07/22/25 0500    Plan  - Antibiotics as listed above  - Fluid resuscitation as follows:Actual Body Weight- The patient's actual body weight will be used to calculate the 30 ml/kg fluid bolus.   - Trend lactate to resolution  - Follow up culture data  - Vasopressors were not needed  -Ordered ESR, CRP and Pro calcitonin levels  -Ordered MRSA PCR      7/22  - sepsis workup has been unremarkable at this point so source seems to be the foot wound, will consult surgery tomorrow to see jif any additional recommendations needed  ROJAS (acute kidney injury)  ROJAS is likely due to acute tubular necrosis caused by sepsis. Baseline creatinine is 0.8. Most recent creatinine and eGFR are listed below.  Recent Labs     07/20/25 2032 07/22/25  0339   CREATININE 2.06* 0.94   EGFRNORACEVR 35* 89      Plan  - ROJAS is worsening. Will continue current treatment  - Avoid nephrotoxins and renally dose meds for GFR listed above  - Monitor urine output, serial BMP, and adjust therapy as needed  - Continue with IV fluid- 0.9% NS@100 ml/hr  Benign essential HTN  Patient's blood pressure range in the last 24 hours was: BP  Min: 111/74  Max: 198/104.The patient's inpatient anti-hypertensive regimen is listed below:  Current Antihypertensives  Patient in Losartan 50mg Po once daily at home.     Plan  - BP is uncontrolled, will adjust as follows: Considering ROJAS, will hold Losartan. Started on Amlodipine 10mg PO once daily  - Monitor BP/HR  Type 2 diabetes mellitus, with long-term current use of insulin  Patient's FSGs are uncontrolled due to hyperglycemia on current medication regimen.  Last A1c reviewed-   Lab Results   Component Value Date    HGBA1C 9.3 (H) 07/21/2025     Most recent fingerstick glucose reviewed-   No results for input(s): "POCTGLUCOSE" in the last 24 hours.    Current correctional scale  " Medium  Maintain anti-hyperglycemic dose as follows-   Antihyperglycemics (From admission, onward)      Start     Stop Route Frequency Ordered    07/21/25 0330  insulin glargine U-100 (Lantus) injection 20 Units         -- SubQ Nightly 07/21/25 0216    07/21/25 0315  insulin aspart U-100 injection 0-10 Units         -- SubQ Before meals & nightly PRN 07/21/25 0216          Hold Oral hypoglycemics while patient is in the hospital.    HBA1C ordered  Hyperlipidemia  Patient is on Crestor 5mg PO once daily  Will change to Atorvastatin 20mg PO Once daily during hospitalization.    Cannabis dependence  Urine analysis positive for Cannabis  Monitor symptoms    New onset a-fib  Patient has paroxysmal (<7 days) atrial fibrillation. Patient is currently in atrial fibrillation. [id number] Score: 2. The patients heart rate in the last 24 hours is as follows:  Pulse  Min: 77  Max: 156     Antiarrhythmics  metoprolol succinate (TOPROL-XL) 24 hr tablet 50 mg, Daily, Oral    Anticoagulants  enoxaparin injection 80 mg, Every 12 hours, Subcutaneous    Plan  - Replete lytes with a goal of K>4, Mg >2  - Patient is anticoagulated, see medications listed above.  - Patient's afib is currently controlled    7/22  - patient developed new onset afib this morning after eating, rates in 150s, given metoprolol 10 mg IV once with rates improved to 100s. Patient denies any cardiac history or recent chest pain/sob/heart palpitations. Given that afib does not seem related to the sepsis at this time will work up as new onset, consult cardio, tsh/echo/trop ordered, tele monitor in place         New onset of congestive heart failure            Esophageal dysphagia  7/22  - continues with trouble swallowing solids more than liquids that has worsened over the last week and after reviewing ct findings will consult GI and continue PPI therapy for now    Nausea and vomiting      VTE Risk Mitigation (From admission, onward)           Ordered     enoxaparin  injection 80 mg  Every 12 hours         07/22/25 1155     IP VTE LOW RISK PATIENT  Once         07/21/25 0216                    Discharge Planning   NIVIA: 7/28/2025     Code Status: Full Code   Medical Readiness for Discharge Date:   Discharge Plan A: Home                        Sandra Baker MD  Department of Hospital Medicine   [facility]

## 2025-07-23 NOTE — ASSESSMENT & PLAN NOTE
7/22  - continues with trouble swallowing solids more than liquids that has worsened over the last week and after reviewing ct findings will consult GI and continue PPI therapy for now

## 2025-07-23 NOTE — ASSESSMENT & PLAN NOTE
Patient has sepsis with Acute kidney injury secondary to Unknown etiology at this time. A review of systems was completed. Patient's sepsis is improving    Current Antibiotics    vancomycin - pharmacy to dose, pharmacy to manage frequency, Intravenous  piperacillin-tazobactam (ZOSYN) 4.5 g in D5W 100 mL IVPB (MB+), Every 8 hours (non-standard times), Intravenous  vancomycin (VANCOCIN) 1,250 mg in 0.9% NaCl 250 mL IVPB, Every 24 hours (non-standard times), Intravenous    Lactate  Recent Labs   Lab 07/20/25 2041 07/20/25 2052 07/20/25  2318   LACTATE 3.9*  --  1.5   POCLAC  --  4.3*  --      Culture Data  Blood Cultures   Culture, Blood   Date Value Ref Range Status   07/21/2025 No Growth At 24 Hours  Preliminary   07/21/2025 No Growth At 24 Hours  Preliminary   02/01/2024 No Growth at 5 days  Final   02/01/2024 No Growth at 5 days  Final   10/09/2023 No Growth at 5 days  Final   10/09/2023 No Growth at 5 days  Final      Urine Culture   Culture, Urine   Date Value Ref Range Status   02/01/2024 >100,000 Proteus mirabilis (A)  Final      mSOFA  MSOFA Total  Min: 0   Min taken time: 07/22/25 2100  Max: 3   Max taken time: 07/22/25 0500    Plan  - Antibiotics as listed above  - Fluid resuscitation as follows:Actual Body Weight- The patient's actual body weight will be used to calculate the 30 ml/kg fluid bolus.   - Trend lactate to resolution  - Follow up culture data  - Vasopressors were not needed  -Ordered ESR, CRP and Pro calcitonin levels  -Ordered MRSA PCR      7/22  - sepsis workup has been unremarkable at this point so source seems to be the foot wound, will consult surgery tomorrow to see jif any additional recommendations needed

## 2025-07-24 LAB
ALBUMIN SERPL BCP-MCNC: 2.9 G/DL (ref 3.4–4.8)
ALBUMIN/GLOB SERPL: 0.9 {RATIO}
ALP SERPL-CCNC: 93 U/L (ref 40–150)
ALT SERPL W P-5'-P-CCNC: 28 U/L
ANION GAP SERPL CALCULATED.3IONS-SCNC: 9 MMOL/L (ref 7–16)
AST SERPL W P-5'-P-CCNC: 32 U/L (ref 11–45)
BASOPHILS # BLD AUTO: 0.07 K/UL (ref 0–0.2)
BASOPHILS NFR BLD AUTO: 0.7 % (ref 0–1)
BILIRUB SERPL-MCNC: 0.7 MG/DL
BUN SERPL-MCNC: 15 MG/DL (ref 8–26)
BUN/CREAT SERPL: 13 (ref 6–20)
CALCIUM SERPL-MCNC: 7.9 MG/DL (ref 8.8–10)
CHLORIDE SERPL-SCNC: 105 MMOL/L (ref 98–107)
CO2 SERPL-SCNC: 25 MMOL/L (ref 23–31)
CREAT SERPL-MCNC: 1.19 MG/DL (ref 0.72–1.25)
DIFFERENTIAL METHOD BLD: ABNORMAL
EGFR (NO RACE VARIABLE) (RUSH/TITUS): 67 ML/MIN/1.73M2
EOSINOPHIL # BLD AUTO: 0.3 K/UL (ref 0–0.5)
EOSINOPHIL NFR BLD AUTO: 2.8 % (ref 1–4)
ERYTHROCYTE [DISTWIDTH] IN BLOOD BY AUTOMATED COUNT: 12.4 % (ref 11.5–14.5)
ESTROGEN SERPL-MCNC: NORMAL PG/ML
GLOBULIN SER-MCNC: 3.2 G/DL (ref 2–4)
GLUCOSE SERPL-MCNC: 173 MG/DL (ref 70–105)
GLUCOSE SERPL-MCNC: 193 MG/DL (ref 70–105)
GLUCOSE SERPL-MCNC: 195 MG/DL (ref 70–105)
GLUCOSE SERPL-MCNC: 198 MG/DL (ref 82–115)
GLUCOSE SERPL-MCNC: 216 MG/DL (ref 70–105)
GLUCOSE SERPL-MCNC: 253 MG/DL (ref 70–105)
HCT VFR BLD AUTO: 38.4 % (ref 40–54)
HGB BLD-MCNC: 13.1 G/DL (ref 13.5–18)
IMM GRANULOCYTES # BLD AUTO: 0.06 K/UL (ref 0–0.04)
IMM GRANULOCYTES NFR BLD: 0.6 % (ref 0–0.4)
INSULIN SERPL-ACNC: NORMAL U[IU]/ML
LAB AP GROSS DESCRIPTION: NORMAL
LAB AP LABORATORY NOTES: NORMAL
LYMPHOCYTES # BLD AUTO: 2.49 K/UL (ref 1–4.8)
LYMPHOCYTES NFR BLD AUTO: 23.5 % (ref 27–41)
MCH RBC QN AUTO: 30.2 PG (ref 27–31)
MCHC RBC AUTO-ENTMCNC: 34.1 G/DL (ref 32–36)
MCV RBC AUTO: 88.5 FL (ref 80–96)
MONOCYTES # BLD AUTO: 1.3 K/UL (ref 0–0.8)
MONOCYTES NFR BLD AUTO: 12.3 % (ref 2–6)
MPC BLD CALC-MCNC: 10.5 FL (ref 9.4–12.4)
NEUTROPHILS # BLD AUTO: 6.37 K/UL (ref 1.8–7.7)
NEUTROPHILS NFR BLD AUTO: 60.1 % (ref 53–65)
NRBC # BLD AUTO: 0 X10E3/UL
NRBC, AUTO (.00): 0 %
OHS QRS DURATION: 86 MS
OHS QTC CALCULATION: 456 MS
PLATELET # BLD AUTO: 272 K/UL (ref 150–400)
POTASSIUM SERPL-SCNC: 3.3 MMOL/L (ref 3.5–5.1)
PROT SERPL-MCNC: 6.1 G/DL (ref 5.8–7.6)
RBC # BLD AUTO: 4.34 M/UL (ref 4.6–6.2)
SODIUM SERPL-SCNC: 136 MMOL/L (ref 136–145)
T3RU NFR SERPL: NORMAL %
TROPONIN I SERPL HS-MCNC: <2.7 NG/L
VANCOMYCIN TROUGH SERPL-MCNC: 8.1 ΜG/ML (ref 15–20)
WBC # BLD AUTO: 10.59 K/UL (ref 4.5–11)

## 2025-07-24 PROCEDURE — 80202 ASSAY OF VANCOMYCIN: CPT | Performed by: INTERNAL MEDICINE

## 2025-07-24 PROCEDURE — 63600175 PHARM REV CODE 636 W HCPCS: Performed by: INTERNAL MEDICINE

## 2025-07-24 PROCEDURE — 25000003 PHARM REV CODE 250

## 2025-07-24 PROCEDURE — 36415 COLL VENOUS BLD VENIPUNCTURE: CPT

## 2025-07-24 PROCEDURE — 84484 ASSAY OF TROPONIN QUANT: CPT

## 2025-07-24 PROCEDURE — 11000001 HC ACUTE MED/SURG PRIVATE ROOM

## 2025-07-24 PROCEDURE — 63600175 PHARM REV CODE 636 W HCPCS: Performed by: NURSE PRACTITIONER

## 2025-07-24 PROCEDURE — 25000003 PHARM REV CODE 250: Performed by: INTERNAL MEDICINE

## 2025-07-24 PROCEDURE — 82962 GLUCOSE BLOOD TEST: CPT

## 2025-07-24 PROCEDURE — 63600175 PHARM REV CODE 636 W HCPCS

## 2025-07-24 PROCEDURE — 85025 COMPLETE CBC W/AUTO DIFF WBC: CPT

## 2025-07-24 PROCEDURE — 25000003 PHARM REV CODE 250: Performed by: NURSE PRACTITIONER

## 2025-07-24 PROCEDURE — 99233 SBSQ HOSP IP/OBS HIGH 50: CPT | Mod: ,,,

## 2025-07-24 PROCEDURE — 94761 N-INVAS EAR/PLS OXIMETRY MLT: CPT

## 2025-07-24 PROCEDURE — 80053 COMPREHEN METABOLIC PANEL: CPT

## 2025-07-24 RX ADMIN — ASPIRIN 81 MG: 81 TABLET, COATED ORAL at 09:07

## 2025-07-24 RX ADMIN — PANTOPRAZOLE SODIUM 40 MG: 40 INJECTION, POWDER, FOR SOLUTION INTRAVENOUS at 09:07

## 2025-07-24 RX ADMIN — INSULIN GLARGINE 20 UNITS: 100 INJECTION, SOLUTION SUBCUTANEOUS at 09:07

## 2025-07-24 RX ADMIN — ENOXAPARIN SODIUM 80 MG: 80 INJECTION SUBCUTANEOUS at 09:07

## 2025-07-24 RX ADMIN — PIPERACILLIN AND TAZOBACTAM 4.5 G: 4; .5 INJECTION, POWDER, LYOPHILIZED, FOR SOLUTION INTRAVENOUS; PARENTERAL at 02:07

## 2025-07-24 RX ADMIN — INSULIN ASPART 4 UNITS: 100 INJECTION, SOLUTION INTRAVENOUS; SUBCUTANEOUS at 12:07

## 2025-07-24 RX ADMIN — VANCOMYCIN HYDROCHLORIDE 1250 MG: 1 INJECTION, POWDER, LYOPHILIZED, FOR SOLUTION INTRAVENOUS at 04:07

## 2025-07-24 RX ADMIN — APIXABAN 5 MG: 5 TABLET, FILM COATED ORAL at 01:07

## 2025-07-24 RX ADMIN — PIPERACILLIN AND TAZOBACTAM 4.5 G: 4; .5 INJECTION, POWDER, LYOPHILIZED, FOR SOLUTION INTRAVENOUS; PARENTERAL at 06:07

## 2025-07-24 RX ADMIN — LOSARTAN POTASSIUM 50 MG: 50 TABLET, FILM COATED ORAL at 09:07

## 2025-07-24 RX ADMIN — INSULIN ASPART 1 UNITS: 100 INJECTION, SOLUTION INTRAVENOUS; SUBCUTANEOUS at 09:07

## 2025-07-24 RX ADMIN — PIPERACILLIN AND TAZOBACTAM 4.5 G: 4; .5 INJECTION, POWDER, LYOPHILIZED, FOR SOLUTION INTRAVENOUS; PARENTERAL at 10:07

## 2025-07-24 RX ADMIN — VANCOMYCIN HYDROCHLORIDE 1250 MG: 500 INJECTION, POWDER, LYOPHILIZED, FOR SOLUTION INTRAVENOUS at 09:07

## 2025-07-24 RX ADMIN — ATORVASTATIN CALCIUM 20 MG: 20 TABLET, FILM COATED ORAL at 09:07

## 2025-07-24 RX ADMIN — APIXABAN 5 MG: 5 TABLET, FILM COATED ORAL at 09:07

## 2025-07-24 RX ADMIN — METOPROLOL SUCCINATE 50 MG: 50 TABLET, EXTENDED RELEASE ORAL at 09:07

## 2025-07-24 RX ADMIN — INSULIN ASPART 2 UNITS: 100 INJECTION, SOLUTION INTRAVENOUS; SUBCUTANEOUS at 05:07

## 2025-07-24 NOTE — PLAN OF CARE
Problem: Adult Inpatient Plan of Care  Goal: Plan of Care Review  Outcome: Progressing  Goal: Patient-Specific Goal (Individualized)  Outcome: Progressing  Goal: Absence of Hospital-Acquired Illness or Injury  Outcome: Progressing  Goal: Optimal Comfort and Wellbeing  Outcome: Progressing  Goal: Readiness for Transition of Care  Outcome: Progressing     Problem: Sepsis/Septic Shock  Goal: Absence of Bleeding  Outcome: Progressing  Goal: Blood Glucose Level Within Targeted Range  Outcome: Progressing  Goal: Absence of Infection Signs and Symptoms  Outcome: Progressing     Problem: Diabetes Comorbidity  Goal: Blood Glucose Level Within Targeted Range  Outcome: Progressing     Problem: Acute Kidney Injury/Impairment  Goal: Fluid and Electrolyte Balance  Outcome: Progressing  Goal: Effective Renal Function  Outcome: Progressing     Problem: Wound  Goal: Optimal Functional Ability  Outcome: Progressing  Goal: Absence of Infection Signs and Symptoms  Outcome: Progressing  Goal: Optimal Pain Control and Function  Outcome: Progressing  Goal: Skin Health and Integrity  Outcome: Progressing     Problem: Hospitalized Older Adult  Goal: Optimal Cognitive Function  Outcome: Progressing

## 2025-07-24 NOTE — PLAN OF CARE
CM reviewed chart.  Per physician's progess notes:  Planned to discharge patient today however during exam patient was ambulating to bed from bathroom and complained of squeezing chest pain, heart racing, and visible shortness of breath. He also stated this happened this morning during breakfast as well.  Plans are for patient to return home upon discharge.  CM will continue to follow for DC needs as they arise.

## 2025-07-24 NOTE — PLAN OF CARE
Problem: Wound  Goal: Optimal Functional Ability  Outcome: Progressing     Problem: Fall Injury Risk  Goal: Absence of Fall and Fall-Related Injury  Outcome: Progressing  Intervention: Promote Injury-Free Environment  Flowsheets (Taken 7/24/2025 1736)  Safety Promotion/Fall Prevention:   assistive device/personal item within reach   side rails raised x 2   nonskid shoes/socks when out of bed

## 2025-07-24 NOTE — ASSESSMENT & PLAN NOTE
Patient has sepsis with Acute kidney injury secondary to Unknown etiology at this time. A review of systems was completed. Patient's sepsis is improving    Current Antibiotics    vancomycin - pharmacy to dose, pharmacy to manage frequency, Intravenous  piperacillin-tazobactam (ZOSYN) 4.5 g in D5W 100 mL IVPB (MB+), Every 8 hours (non-standard times), Intravenous  vancomycin (VANCOCIN) 1,250 mg in 0.9% NaCl 250 mL IVPB, Every 18 hours, Intravenous    Lactate  Recent Labs   Lab 07/20/25 2041 07/20/25 2052 07/20/25  2318   LACTATE 3.9*  --  1.5   POCLAC  --  4.3*  --      Culture Data  Blood Cultures   Culture, Blood   Date Value Ref Range Status   07/21/2025 No Growth At 72 Hours  Preliminary   07/21/2025 No Growth At 72 Hours  Preliminary   02/01/2024 No Growth at 5 days  Final   02/01/2024 No Growth at 5 days  Final   10/09/2023 No Growth at 5 days  Final   10/09/2023 No Growth at 5 days  Final      Urine Culture   Culture, Urine   Date Value Ref Range Status   02/01/2024 >100,000 Proteus mirabilis (A)  Final      mSOFA  MSOFA Total  Min: 0   Min taken time: 07/24/25 1401  Max: 2   Max taken time: 07/24/25 0402    Plan  - Antibiotics as listed above  - Fluid resuscitation as follows:Actual Body Weight- The patient's actual body weight will be used to calculate the 30 ml/kg fluid bolus.   - Trend lactate to resolution  - Follow up culture data  - Vasopressors were not needed  -Ordered ESR, CRP and Pro calcitonin levels  -Ordered MRSA PCR      7/22  - sepsis workup has been unremarkable at this point so source seems to be the foot wound, will consult surgery tomorrow to see jif any additional recommendations needed    7/23  - seen by surgery who agrees this is not from wound infection  - will continue to follow cultures for now and if tomorrow everything is negative will discontinue abx    7/24  - no sepsis source found. Will continue iv abx for today and de-escalate to po tomorrow

## 2025-07-24 NOTE — ASSESSMENT & PLAN NOTE
Patient has paroxysmal (<7 days) atrial fibrillation. Patient is currently in atrial fibrillation. [id number] Score: 2. The patients heart rate in the last 24 hours is as follows:  Pulse  Min: 82  Max: 99     Antiarrhythmics  metoprolol succinate (TOPROL-XL) 24 hr tablet 50 mg, Daily, Oral    Anticoagulants  apixaban tablet 5 mg, 2 times daily, Oral    Plan  - Replete lytes with a goal of K>4, Mg >2  - Patient is anticoagulated, see medications listed above.  - Patient's afib is currently controlled    7/22  - patient developed new onset afib this morning after eating, rates in 150s, given metoprolol 10 mg IV once with rates improved to 100s. Patient denies any cardiac history or recent chest pain/sob/heart palpitations. Given that afib does not seem related to the sepsis at this time will work up as new onset, consult cardio, tsh/echo/trop ordered, tele monitor in place     7/23  - cardiology recs noted    7/24  - planned to discharge patient today however during exam patient was ambulating to bed from bathroom and complained of squeezing chest pain, heart racing, and visible shortness of breath. He also stated this happened this morning during breakfast as well.  - will keep overnight to monitor given new onset afib and CHF, there was a discussion of possible ischemic eval outpatient, if continues will re-consult surgery.  - troponin ordered

## 2025-07-24 NOTE — ASSESSMENT & PLAN NOTE
ROJAS is likely due to acute tubular necrosis caused by sepsis. Baseline creatinine is 0.8. Most recent creatinine and eGFR are listed below.  Recent Labs     07/22/25  0339 07/23/25  0432 07/24/25  0241   CREATININE 0.94 1.02 1.19   EGFRNORACEVR 89 [phone] [id number]      Plan  - ROJAS is worsening. Will continue current treatment  - Avoid nephrotoxins and renally dose meds for GFR listed above  - Monitor urine output, serial BMP, and adjust therapy as needed  - Continue with IV fluid- 0.9% NS@100 ml/hr

## 2025-07-24 NOTE — ASSESSMENT & PLAN NOTE
Patient's blood pressure range in the last 24 hours was: BP  Min: 107/72  Max: 138/86.The patient's inpatient anti-hypertensive regimen is listed below:  Current Antihypertensives  Patient in Losartan 50mg Po once daily at home.     Plan  - BP is uncontrolled, will adjust as follows: Considering ROJAS, will hold Losartan. Started on Amlodipine 10mg PO once daily  - Monitor BP/HR

## 2025-07-24 NOTE — SUBJECTIVE & OBJECTIVE
Interval History:     Review of Systems   All other systems reviewed and are negative.    Objective:     Vital Signs (Most Recent):  Temp: 98.1 °F (36.7 °C) (07/24/25 1107)  Pulse: 98 (07/24/25 1107)  Resp: 17 (07/24/25 1107)  BP: 138/86 (07/24/25 1107)  SpO2: 98 % (07/24/25 1107) Vital Signs (24h Range):  Temp:  [97.8 °F (36.6 °C)-98.7 °F (37.1 °C)] 98.1 °F (36.7 °C)  Pulse:  [82-99] 98  Resp:  [16-19] 17  SpO2:  [94 %-100 %] 98 %  BP: (107-138)/(72-88) 138/86     Weight: 81.6 kg (180 lb)  Body mass index is 25.83 kg/m².    Intake/Output Summary (Last 24 hours) at 7/24/2025 1413  Last data filed at 7/24/2025 0557  Gross per 24 hour   Intake 480 ml   Output --   Net 480 ml         Physical Exam  Vitals and nursing note reviewed.   Constitutional:       Appearance: He is ill-appearing.   HENT:      Head: Normocephalic.      Mouth/Throat:      Mouth: Mucous membranes are moist.   Eyes:      Pupils: Pupils are equal, round, and reactive to light.   Neck:      Vascular: No carotid bruit.   Cardiovascular:      Rate and Rhythm: Tachycardia present. Rhythm irregular.      Heart sounds: No murmur heard.  Pulmonary:      Effort: Pulmonary effort is normal.      Breath sounds: Normal breath sounds.   Abdominal:      General: Abdomen is flat. Bowel sounds are normal. There is no distension.      Palpations: Abdomen is soft.   Musculoskeletal:      Cervical back: Normal range of motion. No rigidity or tenderness.      Right lower leg: No edema.      Left lower leg: No edema.      Comments: See media for left foot wound   Lymphadenopathy:      Cervical: No cervical adenopathy.   Skin:     General: Skin is warm and dry.   Neurological:      General: No focal deficit present.      Mental Status: He is alert. Mental status is at baseline.   Psychiatric:         Mood and Affect: Mood normal.               Significant Labs: All pertinent labs within the past 24 hours have been reviewed.    Significant Imaging: I have reviewed all  pertinent imaging results/findings within the past 24 hours.

## 2025-07-24 NOTE — PROGRESS NOTES
[facility] - [address] [place] [address] Medicine  Progress Note    Patient Name: Antonio Colon  MRN: [id number]  Patient Class: IP- Inpatient   Admission Date: 7/20/2025  Length of Stay: 3 days  Attending Physician: Sandra Bkaer MD  Primary Care Provider: Bhavana Llanos MD        Subjective     Principal Problem:Sepsis with acute organ dysfunction        HPI:  The patient is 69 yr old male with past medical hx of Essential HTN, Type 2 DM( is on insulin), HLD, PVD presented to [facility] with complain of abdominal pain, nausea and vomiting since Friday afternoon. The pain was localized in the umbilical region and described as sore rather than severe. The patient had not eaten since the onset of symptoms and experienced vomiting multiple times since Friday. The patient reported experiencing temperature fluctuations, feeling cold one minute and hot the next, accompanied by sweating. Bowel movements were reported as normal without diarrhea or constipation. Denies fever, chest pain, SOB, palpitation.  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    On initial presentation at ED, patient's vital signs were: /76, ,  Respiratory rate 20, O2 saturation 96% , Afebrile    Workup was notable for WBC 22.6, Hemoglobin 15.2, Hematocrit 43.3, Sodium 128, Potassium 4.5, Chloride 94, Creatinine 2.06 ( baseline 0.8), GFR 35, Glucose 453, BUN 39, AST 56, Total Bilirubin 1.2, ALT 35, Amylase 14.9, Lactic Acid 3.9 (improved to 1.5 after 1 liter NS), Magnesium 1.9  Blood culture pending   Urine drug screen positive for cannabinoid and opiates   Urinalysis: trace ketones, blood present, WBC <1    CT abdomen and pelvis: not significant.   X ray Right foot-Pending result    Patient received  2 L IV NS, Zosyn 4.5 gm IV once and Ondansetron 4mg IV once at ED    Patient will be admitted for further evaluation  and intervention for management of Sepsis.       Overview/Hospital Course:  7/22  - patient developed new onset afib this morning after eating, rates in 150s, given metoprolol 10 mg IV once with rates improved to 100s. Patient denies any cardiac history or recent chest pain/sob/heart palpitations. Given that afib does not seem related to the sepsis at this time will work up as new onset, consult cardio, tsh/echo/trop ordered, tele monitor in place   - continues with trouble swallowing solids more than liquids that has worsened over the last week and after reviewing ct findings will consult GI and continue PPI therapy for now  - sepsis workup has been unremarkable at this point so source seems to be the foot wound, will consult surgery tomorrow to see jif any additional recommendations needed    7/23  - EGD today, recs noted  - seen by surgery who agrees this is not from wound infection  - cardiology recs noted  - will continue to follow cultures for now and if tomorrow everything is negative will discontinue abx    7/24  - planned to discharge patient today however during exam patient was ambulating to bed from bathroom and complained of squeezing chest pain, heart racing, and visible shortness of breath. He also stated this happened this morning during breakfast as well.  - will keep overnight to monitor given new onset afib and CHF, there was a discussion of possible ischemic eval outpatient, if continues will re-consult surgery.  - troponin ordered    Interval History:     Review of Systems   All other systems reviewed and are negative.    Objective:     Vital Signs (Most Recent):  Temp: 98.1 °F (36.7 °C) (07/24/25 1107)  Pulse: 98 (07/24/25 1107)  Resp: 17 (07/24/25 1107)  BP: 138/86 (07/24/25 1107)  SpO2: 98 % (07/24/25 1107) Vital Signs (24h Range):  Temp:  [97.8 °F (36.6 °C)-98.7 °F (37.1 °C)] 98.1 °F (36.7 °C)  Pulse:  [82-99] 98  Resp:  [16-19] 17  SpO2:  [94 %-100 %] 98 %  BP: (107-138)/(72-88) 138/86      Weight: 81.6 kg (180 lb)  Body mass index is 25.83 kg/m².    Intake/Output Summary (Last 24 hours) at 7/24/2025 1413  Last data filed at 7/24/2025 0557  Gross per 24 hour   Intake 480 ml   Output --   Net 480 ml         Physical Exam  Vitals and nursing note reviewed.   Constitutional:       Appearance: He is ill-appearing.   HENT:      Head: Normocephalic.      Mouth/Throat:      Mouth: Mucous membranes are moist.   Eyes:      Pupils: Pupils are equal, round, and reactive to light.   Neck:      Vascular: No carotid bruit.   Cardiovascular:      Rate and Rhythm: Tachycardia present. Rhythm irregular.      Heart sounds: No murmur heard.  Pulmonary:      Effort: Pulmonary effort is normal.      Breath sounds: Normal breath sounds.   Abdominal:      General: Abdomen is flat. Bowel sounds are normal. There is no distension.      Palpations: Abdomen is soft.   Musculoskeletal:      Cervical back: Normal range of motion. No rigidity or tenderness.      Right lower leg: No edema.      Left lower leg: No edema.      Comments: See media for left foot wound   Lymphadenopathy:      Cervical: No cervical adenopathy.   Skin:     General: Skin is warm and dry.   Neurological:      General: No focal deficit present.      Mental Status: He is alert. Mental status is at baseline.   Psychiatric:         Mood and Affect: Mood normal.               Significant Labs: All pertinent labs within the past 24 hours have been reviewed.    Significant Imaging: I have reviewed all pertinent imaging results/findings within the past 24 hours.      Assessment & Plan  Sepsis with acute organ dysfunction  Patient has sepsis with Acute kidney injury secondary to Unknown etiology at this time. A review of systems was completed. Patient's sepsis is improving    Current Antibiotics    vancomycin - pharmacy to dose, pharmacy to manage frequency, Intravenous  piperacillin-tazobactam (ZOSYN) 4.5 g in D5W 100 mL IVPB (MB+), Every 8 hours (non-standard  times), Intravenous  vancomycin (VANCOCIN) 1,250 mg in 0.9% NaCl 250 mL IVPB, Every 18 hours, Intravenous    Lactate  Recent Labs   Lab 07/20/25  2041 07/20/25 2052 07/20/25  2318   LACTATE 3.9*  --  1.5   POCLAC  --  4.3*  --      Culture Data  Blood Cultures   Culture, Blood   Date Value Ref Range Status   07/21/2025 No Growth At 72 Hours  Preliminary   07/21/2025 No Growth At 72 Hours  Preliminary   02/01/2024 No Growth at 5 days  Final   02/01/2024 No Growth at 5 days  Final   10/09/2023 No Growth at 5 days  Final   10/09/2023 No Growth at 5 days  Final      Urine Culture   Culture, Urine   Date Value Ref Range Status   02/01/2024 >100,000 Proteus mirabilis (A)  Final      mSOFA  MSOFA Total  Min: 0   Min taken time: 07/24/25 1401  Max: 2   Max taken time: 07/24/25 0402    Plan  - Antibiotics as listed above  - Fluid resuscitation as follows:Actual Body Weight- The patient's actual body weight will be used to calculate the 30 ml/kg fluid bolus.   - Trend lactate to resolution  - Follow up culture data  - Vasopressors were not needed  -Ordered ESR, CRP and Pro calcitonin levels  -Ordered MRSA PCR      7/22  - sepsis workup has been unremarkable at this point so source seems to be the foot wound, will consult surgery tomorrow to see jif any additional recommendations needed    7/23  - seen by surgery who agrees this is not from wound infection  - will continue to follow cultures for now and if tomorrow everything is negative will discontinue abx    7/24  - no sepsis source found. Will continue iv abx for today and de-escalate to po tomorrow  ROJAS (acute kidney injury)  ROJAS is likely due to acute tubular necrosis caused by sepsis. Baseline creatinine is 0.8. Most recent creatinine and eGFR are listed below.  Recent Labs     07/22/25  0339 07/23/25  0432 07/24/25  0241   CREATININE 0.94 1.02 1.19   EGFRNORACEVR 89 81 67      Plan  - ROJAS is worsening. Will continue current treatment  - Avoid nephrotoxins and renally  "dose meds for GFR listed above  - Monitor urine output, serial BMP, and adjust therapy as needed  - Continue with IV fluid- 0.9% NS@100 ml/hr  Benign essential HTN  Patient's blood pressure range in the last 24 hours was: BP  Min: 107/72  Max: 138/86.The patient's inpatient anti-hypertensive regimen is listed below:  Current Antihypertensives  Patient in Losartan 50mg Po once daily at home.     Plan  - BP is uncontrolled, will adjust as follows: Considering ROJAS, will hold Losartan. Started on Amlodipine 10mg PO once daily  - Monitor BP/HR  Type 2 diabetes mellitus, with long-term current use of insulin  Patient's FSGs are uncontrolled due to hyperglycemia on current medication regimen.  Last A1c reviewed-   Lab Results   Component Value Date    HGBA1C 9.3 (H) 07/21/2025     Most recent fingerstick glucose reviewed-   No results for input(s): "POCTGLUCOSE" in the last 24 hours.    Current correctional scale  Medium  Maintain anti-hyperglycemic dose as follows-   Antihyperglycemics (From admission, onward)      Start     Stop Route Frequency Ordered    07/21/25 0330  insulin glargine U-100 (Lantus) injection 20 Units         -- SubQ Nightly 07/21/25 0216    07/21/25 0315  insulin aspart U-100 injection 0-10 Units         -- SubQ Before meals & nightly PRN 07/21/25 0216          Hold Oral hypoglycemics while patient is in the hospital.    HBA1C ordered  Hyperlipidemia  Patient is on Crestor 5mg PO once daily  Will change to Atorvastatin 20mg PO Once daily during hospitalization.    Cannabis dependence  Urine analysis positive for Cannabis  Monitor symptoms    New onset a-fib  Patient has paroxysmal (<7 days) atrial fibrillation. Patient is currently in atrial fibrillation. [id number] Score: 2. The patients heart rate in the last 24 hours is as follows:  Pulse  Min: 82  Max: 99     Antiarrhythmics  metoprolol succinate (TOPROL-XL) 24 hr tablet 50 mg, Daily, Oral    Anticoagulants  apixaban tablet 5 mg, 2 times daily, " Oral    Plan  - Replete lytes with a goal of K>4, Mg >2  - Patient is anticoagulated, see medications listed above.  - Patient's afib is currently controlled    7/22  - patient developed new onset afib this morning after eating, rates in 150s, given metoprolol 10 mg IV once with rates improved to 100s. Patient denies any cardiac history or recent chest pain/sob/heart palpitations. Given that afib does not seem related to the sepsis at this time will work up as new onset, consult cardio, tsh/echo/trop ordered, tele monitor in place     7/23  - cardiology recs noted    7/24  - planned to discharge patient today however during exam patient was ambulating to bed from bathroom and complained of squeezing chest pain, heart racing, and visible shortness of breath. He also stated this happened this morning during breakfast as well.  - will keep overnight to monitor given new onset afib and CHF, there was a discussion of possible ischemic eval outpatient, if continues will re-consult surgery.  - troponin ordered  New onset of congestive heart failure  Echo  Result Date: 7/22/2025    Left Ventricle: The left ventricle is normal in size. Left ventricle   fractional shortening is 22.9%. Normal wall thickness. There is concentric   remodeling. Moderate global hypokinesis present. There is moderately   reduced systolic function with a visually estimated ejection fraction of   30 - 40%. Unable to assess diastolic function due to atrial fibrillation.    Right Ventricle: The right ventricle is normal in size measuring 3.3   cm. Systolic function is reduced.    Left Atrium: The left atrium is dilated.    Mitral Valve: There is mild regurgitation.    Pulmonary Artery: The estimated pulmonary artery systolic pressure is   12 mmHg.    IVC/SVC: Normal venous pressure at 3 mmHg.                 Esophageal dysphagia  7/22  - continues with trouble swallowing solids more than liquids that has worsened over the last week and after reviewing  ct findings will consult GI and continue PPI therapy for now    7/23  - EGD today, recs noted    Nausea and vomiting      Gastroesophageal reflux disease with esophagitis without hemorrhage      HH (hiatus hernia)      Gastritis without bleeding      Open wound of right foot      VTE Risk Mitigation (From admission, onward)           Ordered     apixaban tablet 5 mg  2 times daily         07/24/25 1144     IP VTE LOW RISK PATIENT  Once         07/21/25 0216                    Discharge Planning   NIVIA: 7/24/2025     Code Status: Full Code   Medical Readiness for Discharge Date: 7/24/2025  Discharge Plan A: Home                        Sandra Baker MD  Department of Hospital Medicine   [facility] - [facility] Tele[facility]

## 2025-07-24 NOTE — PROGRESS NOTES
Patient R foot re-consulted for increase in drainage. Per Nazia Avelar RN note, patient was seen on Monday. Follow up appointment scheduled. Nazia Avelar RN asked could I observe patients drainage to R foot today. No drainage to R foot at this time, bonnie wound dry. Applied Aquacel to small opening, gauze, rolled gauze and ace wrap. Patient tolerated wound care well with no complaints. Will see patient on follow up date.

## 2025-07-24 NOTE — PROGRESS NOTES
Pharmacy assisting in the management of vancomycin for this patient for: sepsis    Clinical info: blood cx negative to date, no fever in last 24 hours    Vancomycin level: 8.1    Changes to be made: change to 1250mg every 18 hours    Pharmacy will continue to monitor daily and make adjustments as needed.    Mariela Cat, PharmD  [phone]

## 2025-07-25 VITALS
DIASTOLIC BLOOD PRESSURE: 88 MMHG | OXYGEN SATURATION: 98 % | TEMPERATURE: 98 F | WEIGHT: 178 LBS | RESPIRATION RATE: 16 BRPM | SYSTOLIC BLOOD PRESSURE: 135 MMHG | BODY MASS INDEX: 25.48 KG/M2 | HEART RATE: 105 BPM | HEIGHT: 70 IN

## 2025-07-25 LAB
BASOPHILS # BLD AUTO: 0.07 K/UL (ref 0–0.2)
BASOPHILS NFR BLD AUTO: 0.7 % (ref 0–1)
DIFFERENTIAL METHOD BLD: ABNORMAL
EOSINOPHIL # BLD AUTO: 0.29 K/UL (ref 0–0.5)
EOSINOPHIL NFR BLD AUTO: 2.7 % (ref 1–4)
ERYTHROCYTE [DISTWIDTH] IN BLOOD BY AUTOMATED COUNT: 12.5 % (ref 11.5–14.5)
GLUCOSE SERPL-MCNC: 133 MG/DL (ref 70–105)
GLUCOSE SERPL-MCNC: 201 MG/DL (ref 70–105)
HCT VFR BLD AUTO: 39.3 % (ref 40–54)
HGB BLD-MCNC: 13.6 G/DL (ref 13.5–18)
IMM GRANULOCYTES # BLD AUTO: 0.06 K/UL (ref 0–0.04)
IMM GRANULOCYTES NFR BLD: 0.6 % (ref 0–0.4)
LYMPHOCYTES # BLD AUTO: 2.34 K/UL (ref 1–4.8)
LYMPHOCYTES NFR BLD AUTO: 21.8 % (ref 27–41)
MCH RBC QN AUTO: 30.5 PG (ref 27–31)
MCHC RBC AUTO-ENTMCNC: 34.6 G/DL (ref 32–36)
MCV RBC AUTO: 88.1 FL (ref 80–96)
MONOCYTES # BLD AUTO: 1.24 K/UL (ref 0–0.8)
MONOCYTES NFR BLD AUTO: 11.5 % (ref 2–6)
MPC BLD CALC-MCNC: 10.5 FL (ref 9.4–12.4)
NEUTROPHILS # BLD AUTO: 6.75 K/UL (ref 1.8–7.7)
NEUTROPHILS NFR BLD AUTO: 62.7 % (ref 53–65)
NRBC # BLD AUTO: 0 X10E3/UL
NRBC, AUTO (.00): 0 %
PLATELET # BLD AUTO: 295 K/UL (ref 150–400)
RBC # BLD AUTO: 4.46 M/UL (ref 4.6–6.2)
WBC # BLD AUTO: 10.75 K/UL (ref 4.5–11)

## 2025-07-25 PROCEDURE — 36415 COLL VENOUS BLD VENIPUNCTURE: CPT

## 2025-07-25 PROCEDURE — 94761 N-INVAS EAR/PLS OXIMETRY MLT: CPT

## 2025-07-25 PROCEDURE — 82962 GLUCOSE BLOOD TEST: CPT

## 2025-07-25 PROCEDURE — 63600175 PHARM REV CODE 636 W HCPCS

## 2025-07-25 PROCEDURE — 25000003 PHARM REV CODE 250

## 2025-07-25 PROCEDURE — 99239 HOSP IP/OBS DSCHRG MGMT >30: CPT | Mod: ,,,

## 2025-07-25 PROCEDURE — 63600175 PHARM REV CODE 636 W HCPCS: Performed by: INTERNAL MEDICINE

## 2025-07-25 PROCEDURE — 85025 COMPLETE CBC W/AUTO DIFF WBC: CPT

## 2025-07-25 PROCEDURE — 25000003 PHARM REV CODE 250: Performed by: NURSE PRACTITIONER

## 2025-07-25 RX ORDER — METOPROLOL SUCCINATE 100 MG/1
100 TABLET, EXTENDED RELEASE ORAL DAILY
Qty: 90 TABLET | Refills: 3 | Status: SHIPPED | OUTPATIENT
Start: 2025-07-26 | End: 2026-07-26

## 2025-07-25 RX ORDER — CEFDINIR 300 MG/1
300 CAPSULE ORAL EVERY 12 HOURS
Qty: 20 CAPSULE | Refills: 0 | Status: SHIPPED | OUTPATIENT
Start: 2025-07-25 | End: 2025-08-04

## 2025-07-25 RX ORDER — METOPROLOL SUCCINATE 100 MG/1
100 TABLET, EXTENDED RELEASE ORAL DAILY
Status: DISCONTINUED | OUTPATIENT
Start: 2025-07-26 | End: 2025-07-25 | Stop reason: HOSPADM

## 2025-07-25 RX ORDER — DOXYCYCLINE HYCLATE 100 MG
100 TABLET ORAL EVERY 12 HOURS
Qty: 10 TABLET | Refills: 0 | Status: SHIPPED | OUTPATIENT
Start: 2025-07-25 | End: 2025-07-30

## 2025-07-25 RX ORDER — PANTOPRAZOLE SODIUM 40 MG/1
40 TABLET, DELAYED RELEASE ORAL 2 TIMES DAILY
Qty: 60 TABLET | Refills: 0 | Status: SHIPPED | OUTPATIENT
Start: 2025-07-25 | End: 2025-08-24

## 2025-07-25 RX ORDER — CEFDINIR 300 MG/1
300 CAPSULE ORAL EVERY 12 HOURS
Status: DISCONTINUED | OUTPATIENT
Start: 2025-07-25 | End: 2025-07-25 | Stop reason: HOSPADM

## 2025-07-25 RX ORDER — DOXYCYCLINE HYCLATE 100 MG
100 TABLET ORAL EVERY 12 HOURS
Status: DISCONTINUED | OUTPATIENT
Start: 2025-07-25 | End: 2025-07-25 | Stop reason: HOSPADM

## 2025-07-25 RX ORDER — ATORVASTATIN CALCIUM 20 MG/1
40 TABLET, FILM COATED ORAL NIGHTLY
Qty: 60 TABLET | Refills: 0 | Status: SHIPPED | OUTPATIENT
Start: 2025-07-25 | End: 2025-08-24

## 2025-07-25 RX ORDER — LOSARTAN POTASSIUM 25 MG/1
25 TABLET ORAL DAILY
Qty: 90 TABLET | Refills: 3 | Status: SHIPPED | OUTPATIENT
Start: 2025-07-25 | End: 2026-07-25

## 2025-07-25 RX ADMIN — CEFDINIR 300 MG: 300 CAPSULE ORAL at 11:07

## 2025-07-25 RX ADMIN — PIPERACILLIN AND TAZOBACTAM 4.5 G: 4; .5 INJECTION, POWDER, LYOPHILIZED, FOR SOLUTION INTRAVENOUS; PARENTERAL at 02:07

## 2025-07-25 RX ADMIN — DOXYCYCLINE HYCLATE 100 MG: 100 TABLET, COATED ORAL at 11:07

## 2025-07-25 RX ADMIN — LOSARTAN POTASSIUM 50 MG: 50 TABLET, FILM COATED ORAL at 09:07

## 2025-07-25 RX ADMIN — APIXABAN 5 MG: 5 TABLET, FILM COATED ORAL at 09:07

## 2025-07-25 RX ADMIN — ASPIRIN 81 MG: 81 TABLET, COATED ORAL at 09:07

## 2025-07-25 RX ADMIN — PANTOPRAZOLE SODIUM 40 MG: 40 INJECTION, POWDER, FOR SOLUTION INTRAVENOUS at 09:07

## 2025-07-25 RX ADMIN — INSULIN ASPART 4 UNITS: 100 INJECTION, SOLUTION INTRAVENOUS; SUBCUTANEOUS at 12:07

## 2025-07-25 RX ADMIN — METOPROLOL SUCCINATE 50 MG: 50 TABLET, EXTENDED RELEASE ORAL at 09:07

## 2025-07-25 NOTE — ASSESSMENT & PLAN NOTE
Patient has sepsis with Acute kidney injury secondary to Unknown etiology at this time. A review of systems was completed. Patient's sepsis is improving    Current Antibiotics    doxycycline tablet 100 mg, Every 12 hours, Oral  cefdinir capsule 300 mg, Every 12 hours, Oral  cefdinir (OMNICEF) capsule 300 mg, Every 12 hours, Oral  doxycycline (VIBRA-TABS) 100 mg tablet, Every 12 hours, Oral    Lactate  Recent Labs   Lab 07/20/25 2041 07/20/25 2052 07/20/25  2318   LACTATE 3.9*  --  1.5   POCLAC  --  4.3*  --      Culture Data  Blood Cultures   Culture, Blood   Date Value Ref Range Status   07/21/2025 No Growth At 72 Hours  Preliminary   07/21/2025 No Growth At 72 Hours  Preliminary   02/01/2024 No Growth at 5 days  Final   02/01/2024 No Growth at 5 days  Final   10/09/2023 No Growth at 5 days  Final   10/09/2023 No Growth at 5 days  Final      Urine Culture   Culture, Urine   Date Value Ref Range Status   02/01/2024 >100,000 Proteus mirabilis (A)  Final      mSOFA  MSOFA Total  Min: 0   Min taken time: 07/25/25 1201  Max: 0   Max taken time: 07/25/25 1201    Plan  - Antibiotics as listed above  - Fluid resuscitation as follows:Actual Body Weight- The patient's actual body weight will be used to calculate the 30 ml/kg fluid bolus.   - Trend lactate to resolution  - Follow up culture data  - Vasopressors were not needed  -Ordered ESR, CRP and Pro calcitonin levels  -Ordered MRSA PCR      7/22  - sepsis workup has been unremarkable at this point so source seems to be the foot wound, will consult surgery tomorrow to see jif any additional recommendations needed    7/23  - seen by surgery who agrees this is not from wound infection  - will continue to follow cultures for now and if tomorrow everything is negative will discontinue abx    7/24  - no sepsis source found. Will continue iv abx for today and de-escalate to po tomorrow

## 2025-07-25 NOTE — PLAN OF CARE
[facility]  [facility] Final Note    Primary Care Provider: Bhavana Llanos MD    Expected Discharge Date: 7/25/2025    Final Discharge Note (most recent)       Final Note - 07/25/25 1531          Final Note    Assessment Type Final Discharge Note     Anticipated Discharge Disposition Home or Self Care     What phone number can be called within the next 1-3 days to see how you are doing after discharge? [phone]        Post-Acute Status    Discharge Delays None known at this time                     Important Message from Medicare  Important Message from Medicare regarding Discharge Appeal Rights: Explained to patient/caregiver, Signed/date by patient/caregiver     Date IMM was signed: 07/24/25  Time IMM was signed: 1500    Contact Info       Isaiah Wayne ACNP   Specialty: Cardiology, Emergency Medicine    [address] [place] [id number]   Phone: [phone]       Next Steps: Follow up on 8/12/2025    Instructions: Appointment scheduled with Cardiology on 08/12/2025 at 9:40 a.m.    Bhavana Llanos MD   Specialty: Family Medicine   Relationship: PCP - General    [id number] [address]  [facility] [id number]   Phone: [phone]       Next Steps: Follow up in 1 week(s)    Instructions: CLINIC CLOSED ON FRIDAY PLEASE MAKE APPOINTMENT MONDAY          Patient will be discharged home today.  IM updated.  No further needs.

## 2025-07-25 NOTE — PHYSICIAN QUERY
Please provide the integumentary diagnosis related to the documentation of the Right foot.    Diabetic ulcer, skin breakdown only

## 2025-07-25 NOTE — ASSESSMENT & PLAN NOTE
Patient has paroxysmal (<7 days) atrial fibrillation. Patient is currently in atrial fibrillation. [id number] Score: 2. The patients heart rate in the last 24 hours is as follows:  Pulse  Min: 91  Max: 105     Antiarrhythmics  metoprolol succinate (TOPROL-XL) 24 hr tablet 100 mg, Daily, Oral  metoprolol succinate (TOPROL-XL) 24 hr tablet, Daily, Oral    Anticoagulants  apixaban tablet 5 mg, 2 times daily, Oral  apixaban tablet, 2 times daily, Oral    Plan  - Replete lytes with a goal of K>4, Mg >2  - Patient is anticoagulated, see medications listed above.  - Patient's afib is currently controlled    7/22  - patient developed new onset afib this morning after eating, rates in 150s, given metoprolol 10 mg IV once with rates improved to 100s. Patient denies any cardiac history or recent chest pain/sob/heart palpitations. Given that afib does not seem related to the sepsis at this time will work up as new onset, consult cardio, tsh/echo/trop ordered, tele monitor in place     7/23  - cardiology recs noted    7/24  - planned to discharge patient today however during exam patient was ambulating to bed from bathroom and complained of squeezing chest pain, heart racing, and visible shortness of breath. He also stated this happened this morning during breakfast as well.  - will keep overnight to monitor given new onset afib and CHF, there was a discussion of possible ischemic eval outpatient, if continues will re-consult surgery.  - troponin ordered

## 2025-07-25 NOTE — ASSESSMENT & PLAN NOTE
Patient's blood pressure range in the last 24 hours was: BP  Min: 112/76  Max: 135/88.The patient's inpatient anti-hypertensive regimen is listed below:  Current Antihypertensives  Patient in Losartan 50mg Po once daily at home.     Plan  - BP is uncontrolled, will adjust as follows: Considering ROJAS, will hold Losartan. Started on Amlodipine 10mg PO once daily  - Monitor BP/HR

## 2025-07-25 NOTE — NURSING
1350 Patient discharged transferred via wheelchair. No distress noted. Patient belongings in hand.

## 2025-07-25 NOTE — PLAN OF CARE
Problem: Adult Inpatient Plan of Care  Goal: Plan of Care Review  Outcome: Progressing  Goal: Patient-Specific Goal (Individualized)  Outcome: Progressing  Goal: Absence of Hospital-Acquired Illness or Injury  Outcome: Progressing  Goal: Optimal Comfort and Wellbeing  Outcome: Progressing  Goal: Readiness for Transition of Care  Outcome: Progressing     Problem: Sepsis/Septic Shock  Goal: Absence of Bleeding  Outcome: Progressing  Goal: Blood Glucose Level Within Targeted Range  Outcome: Progressing  Goal: Absence of Infection Signs and Symptoms  Outcome: Progressing     Problem: Diabetes Comorbidity  Goal: Blood Glucose Level Within Targeted Range  Outcome: Progressing     Problem: Acute Kidney Injury/Impairment  Goal: Fluid and Electrolyte Balance  Outcome: Progressing  Goal: Effective Renal Function  Outcome: Progressing     Problem: Wound  Goal: Optimal Functional Ability  Outcome: Progressing  Goal: Absence of Infection Signs and Symptoms  Outcome: Progressing  Goal: Optimal Pain Control and Function  Outcome: Progressing  Goal: Skin Health and Integrity  Outcome: Progressing  Goal: Optimal Wound Healing  Outcome: Progressing     Problem: Hospitalized Older Adult  Goal: Optimal Cognitive Function  Outcome: Progressing  Goal: Fluid and Electrolyte Balance  Outcome: Progressing  Goal: Optimal Functional Ability  Outcome: Progressing     Problem: Fall Injury Risk  Goal: Absence of Fall and Fall-Related Injury  Outcome: Progressing

## 2025-07-25 NOTE — DISCHARGE SUMMARY
[facility] - [address] [place] [address] Medicine  Discharge Summary      Patient Name: Antonio Colon  MRN: [id number]  [facility]: [id number]  Patient Class: IP- Inpatient  Admission Date: 7/20/2025  Hospital Length of Stay: 4 days  Discharge Date and Time: 07/25/2025 12:48 PM  Attending Physician: Sandra Baker MD   Discharging Provider: Sandra Baker MD  Primary Care Provider: Bhavana Llanos MD    Primary Care Team: Networked reference to record PCT     HPI:   The patient is 69 yr old male with past medical hx of Essential HTN, Type 2 DM( is on insulin), HLD, PVD presented to [facility] ED with complain of abdominal pain, nausea and vomiting since Friday afternoon. The pain was localized in the umbilical region and described as sore rather than severe. The patient had not eaten since the onset of symptoms and experienced vomiting multiple times since Friday. The patient reported experiencing temperature fluctuations, feeling cold one minute and hot the next, accompanied by sweating. Bowel movements were reported as normal without diarrhea or constipation. Denies fever, chest pain, SOB, palpitation.  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    On initial presentation at ED, patient's vital signs were: /76, ,  Respiratory rate 20, O2 saturation 96% , Afebrile    Workup was notable for WBC 22.6, Hemoglobin 15.2, Hematocrit 43.3, Sodium 128, Potassium 4.5, Chloride 94, Creatinine 2.06 ( baseline 0.8), GFR 35, Glucose 453, BUN 39, AST 56, Total Bilirubin 1.2, ALT 35, Amylase 14.9, Lactic Acid 3.9 (improved to 1.5 after 1 liter NS), Magnesium 1.9  Blood culture pending   Urine drug screen positive for cannabinoid and opiates   Urinalysis: trace ketones, blood present, WBC <1    CT abdomen and pelvis: not significant.   X ray Right foot-Pending result    Patient received  2 L IV NS, Zosyn  4.5 gm IV once and Ondansetron 4mg IV once at ED    Patient will be admitted for further evaluation and intervention for management of Sepsis.       * No surgery found *      Hospital Course:   7/22  - patient developed new onset afib this morning after eating, rates in 150s, given metoprolol 10 mg IV once with rates improved to 100s. Patient denies any cardiac history or recent chest pain/sob/heart palpitations. Given that afib does not seem related to the sepsis at this time will work up as new onset, consult cardio, tsh/echo/trop ordered, tele monitor in place   - continues with trouble swallowing solids more than liquids that has worsened over the last week and after reviewing ct findings will consult GI and continue PPI therapy for now  - sepsis workup has been unremarkable at this point so source seems to be the foot wound, will consult surgery tomorrow to see jif any additional recommendations needed    7/23  - EGD today, recs noted  - seen by surgery who agrees this is not from wound infection  - cardiology recs noted  - will continue to follow cultures for now and if tomorrow everything is negative will discontinue abx    7/24  - planned to discharge patient today however during exam patient was ambulating to bed from bathroom and complained of squeezing chest pain, heart racing, and visible shortness of breath. He also stated this happened this morning during breakfast as well.  - will keep overnight to monitor given new onset afib and CHF, there was a discussion of possible ischemic eval outpatient, if continues will re-consult surgery.  - troponin ordered    7/25  - continues with some trouble swallowing, can tolerate soft diet well, advised to continue soft diet for now and advance as tolerated at home, continue protonix twice a day as recommended by GI, follow with pcp in 1 week  - new systolic heart failure: metoprolol 100 mg daily, losartan 25 mg daily (down titrated to tolerate metoprolol which is  needed for afib control), should start jardiance and spironolactone outpatient as tolerated  - continue doxy and cefdinir for 5 days for sepsis  - ok to discharge today     Goals of Care Treatment Preferences:  Code Status: Full Code         Consults:   Consults (From admission, onward)          Status Ordering Provider     Inpatient consult to General Surgery  Once        Provider:  Boyd Mcconnell MD    Completed MATT CALZADA     Inpatient consult to Cardiology  Once        Provider:  Jonah Trejo MD    Completed MATT CALZADA     Inpatient consult to Gastroenterology  Once        Provider:  Chuy Bills MD    Completed MATT CALZADA            Assessment & Plan  Sepsis with acute organ dysfunction  Patient has sepsis with Acute kidney injury secondary to Unknown etiology at this time. A review of systems was completed. Patient's sepsis is improving    Current Antibiotics    doxycycline tablet 100 mg, Every 12 hours, Oral  cefdinir capsule 300 mg, Every 12 hours, Oral  cefdinir (OMNICEF) capsule 300 mg, Every 12 hours, Oral  doxycycline (VIBRA-TABS) 100 mg tablet, Every 12 hours, Oral    Lactate  Recent Labs   Lab 07/20/25 2041 07/20/25 2052 07/20/25  2318   LACTATE 3.9*  --  1.5   POCLAC  --  4.3*  --      Culture Data  Blood Cultures   Culture, Blood   Date Value Ref Range Status   07/21/2025 No Growth At 72 Hours  Preliminary   07/21/2025 No Growth At 72 Hours  Preliminary   02/01/2024 No Growth at 5 days  Final   02/01/2024 No Growth at 5 days  Final   10/09/2023 No Growth at 5 days  Final   10/09/2023 No Growth at 5 days  Final      Urine Culture   Culture, Urine   Date Value Ref Range Status   02/01/2024 >100,000 Proteus mirabilis (A)  Final      mSOFA  MSOFA Total  Min: 0   Min taken time: 07/25/25 1201  Max: 0   Max taken time: 07/25/25 1201    Plan  - Antibiotics as listed above  - Fluid resuscitation as follows:Actual Body Weight- The patient's actual body weight will be used to  "calculate the 30 ml/kg fluid bolus.   - Trend lactate to resolution  - Follow up culture data  - Vasopressors were not needed  -Ordered ESR, CRP and Pro calcitonin levels  -Ordered MRSA PCR      7/22  - sepsis workup has been unremarkable at this point so source seems to be the foot wound, will consult surgery tomorrow to see jif any additional recommendations needed    7/23  - seen by surgery who agrees this is not from wound infection  - will continue to follow cultures for now and if tomorrow everything is negative will discontinue abx    7/24  - no sepsis source found. Will continue iv abx for today and de-escalate to po tomorrow  ROJAS (acute kidney injury)  ROJAS is likely due to acute tubular necrosis caused by sepsis. Baseline creatinine is 0.8. Most recent creatinine and eGFR are listed below.  Recent Labs     07/23/25  0432 07/24/25  0241   CREATININE 1.02 1.19   EGFRNORACEVR 81 67      Plan  - ROJAS is worsening. Will continue current treatment  - Avoid nephrotoxins and renally dose meds for GFR listed above  - Monitor urine output, serial BMP, and adjust therapy as needed  - Continue with IV fluid- 0.9% NS@100 ml/hr  Benign essential HTN  Patient's blood pressure range in the last 24 hours was: BP  Min: 112/76  Max: 135/88.The patient's inpatient anti-hypertensive regimen is listed below:  Current Antihypertensives  Patient in Losartan 50mg Po once daily at home.     Plan  - BP is uncontrolled, will adjust as follows: Considering ROJAS, will hold Losartan. Started on Amlodipine 10mg PO once daily  - Monitor BP/HR  Type 2 diabetes mellitus, with long-term current use of insulin  Patient's FSGs are uncontrolled due to hyperglycemia on current medication regimen.  Last A1c reviewed-   Lab Results   Component Value Date    HGBA1C 9.3 (H) 07/21/2025     Most recent fingerstick glucose reviewed-   No results for input(s): "POCTGLUCOSE" in the last 24 hours.    Current correctional scale  Medium  Maintain " anti-hyperglycemic dose as follows-   Antihyperglycemics (From admission, onward)      Start     Stop Route Frequency Ordered    07/21/25 0330  insulin glargine U-100 (Lantus) injection 20 Units         -- SubQ Nightly 07/21/25 0216    07/21/25 0315  insulin aspart U-100 injection 0-10 Units         -- SubQ Before meals & nightly PRN 07/21/25 0216          Hold Oral hypoglycemics while patient is in the hospital.    HBA1C ordered  Hyperlipidemia  Patient is on Crestor 5mg PO once daily  Will change to Atorvastatin 20mg PO Once daily during hospitalization.    Cannabis dependence  Urine analysis positive for Cannabis  Monitor symptoms    New onset a-fib  Patient has paroxysmal (<7 days) atrial fibrillation. Patient is currently in atrial fibrillation. PMUNE0PSIs Score: 2. The patients heart rate in the last 24 hours is as follows:  Pulse  Min: 91  Max: 105     Antiarrhythmics  metoprolol succinate (TOPROL-XL) 24 hr tablet 100 mg, Daily, Oral  metoprolol succinate (TOPROL-XL) 24 hr tablet, Daily, Oral    Anticoagulants  apixaban tablet 5 mg, 2 times daily, Oral  apixaban tablet, 2 times daily, Oral    Plan  - Replete lytes with a goal of K>4, Mg >2  - Patient is anticoagulated, see medications listed above.  - Patient's afib is currently controlled    7/22  - patient developed new onset afib this morning after eating, rates in 150s, given metoprolol 10 mg IV once with rates improved to 100s. Patient denies any cardiac history or recent chest pain/sob/heart palpitations. Given that afib does not seem related to the sepsis at this time will work up as new onset, consult cardio, tsh/echo/trop ordered, tele monitor in place     7/23  - cardiology recs noted    7/24  - planned to discharge patient today however during exam patient was ambulating to bed from bathroom and complained of squeezing chest pain, heart racing, and visible shortness of breath. He also stated this happened this morning during breakfast as well.  -  will keep overnight to monitor given new onset afib and CHF, there was a discussion of possible ischemic eval outpatient, if continues will re-consult surgery.  - troponin ordered  New onset of congestive heart failure  Echo  Result Date: 7/22/2025    Left Ventricle: The left ventricle is normal in size. Left ventricle   fractional shortening is 22.9%. Normal wall thickness. There is concentric   remodeling. Moderate global hypokinesis present. There is moderately   reduced systolic function with a visually estimated ejection fraction of   30 - 40%. Unable to assess diastolic function due to atrial fibrillation.    Right Ventricle: The right ventricle is normal in size measuring 3.3   cm. Systolic function is reduced.    Left Atrium: The left atrium is dilated.    Mitral Valve: There is mild regurgitation.    Pulmonary Artery: The estimated pulmonary artery systolic pressure is   12 mmHg.    IVC/SVC: Normal venous pressure at 3 mmHg.                 Esophageal dysphagia  7/22  - continues with trouble swallowing solids more than liquids that has worsened over the last week and after reviewing ct findings will consult GI and continue PPI therapy for now    7/23  - EGD today, recs noted    Nausea and vomiting      Gastroesophageal reflux disease with esophagitis without hemorrhage      HH (hiatus hernia)      Gastritis without bleeding      Open wound of right foot      Final Active Diagnoses:    Diagnosis Date Noted POA    PRINCIPAL PROBLEM:  Sepsis with acute organ dysfunction [A41.9, R65.20] 07/21/2025 Yes    Gastroesophageal reflux disease with esophagitis without hemorrhage [K21.00] 07/23/2025 Yes    HH (hiatus hernia) [K44.9] 07/23/2025 Yes    Gastritis without bleeding [K29.70] 07/23/2025 Yes    Open wound of right foot [S91.301A] 07/23/2025 Yes    New onset a-fib [I48.91] 07/22/2025 No    New onset of congestive heart failure [I50.9] 07/22/2025 Yes    Esophageal dysphagia [R13.19] 07/22/2025 Yes    Nausea and  vomiting [R11.2] 07/22/2025 Yes    ROJAS (acute kidney injury) [N17.9] 07/21/2025 Yes    Benign essential HTN [I10] 07/21/2025 Yes    Type 2 diabetes mellitus, with long-term current use of insulin [E11.9, Z79.4] 07/21/2025 Not Applicable    Hyperlipidemia [E78.5] 07/21/2025 Yes    Cannabis dependence [F12.20] 07/21/2025 Yes      Problems Resolved During this Admission:       Discharged Condition: stable    Disposition: Home or Self Care    Follow Up:   Follow-up Information       Isaiah Wayne ACNP Follow up on 8/12/2025.    Specialties: Cardiology, Emergency Medicine  Why: Appointment scheduled with Cardiology on 08/12/2025 at 9:40 a.m.  Contact information:  [address] [place] [id number]  [phone]               Bhavana Llanos MD Follow up in 1 week(s).    Specialty: Family Medicine  Contact information:  [id number]  [facility] [id number] [phone]                           Patient Instructions:   No discharge procedures on file.    Significant Diagnostic Studies: N/A    Pending Diagnostic Studies:       Procedure Component Value Units Date/Time    EXTRA TUBES [[id number]]     Order Status: Sent Lab Status: No result     Specimen: Blood, Venous     Narrative:      The following orders were created for panel order EXTRA TUBES.  Procedure                               Abnormality         Status                     ---------                               -----------         ------                     Light Green Top Hold[[id number]]                                                         Please view results for these tests on the individual orders.    EXTRA TUBES [[id number]] Collected: 07/20/25 2041    Order Status: Sent Lab Status: In process Updated: 07/20/25 2053    Specimen: Blood, Venous     Narrative:      The following orders were created for panel order EXTRA TUBES.  Procedure                               Abnormality         Status                     ---------                                -----------         ------                     Light Green Top Hold[[id number]]                            In process                   Please view results for these tests on the individual orders.    EXTRA TUBES [[id number]] Collected: 07/20/25 2041    Order Status: Sent Lab Status: In process Updated: 07/20/25 2053    Specimen: Blood, Venous     Narrative:      The following orders were created for panel order EXTRA TUBES.  Procedure                               Abnormality         Status                     ---------                               -----------         ------                     Light Blue Top Hold[[id number]]                             In process                 Red Top Hold[[id number]]                                    In process                 Light Green Top Hold[[id number]]                            In process                 Lavender Top Hold[[id number]]                               In process                 Gold Top Hold[[id number]]                                   In process                   Please view results for these tests on the individual orders.    EXTRA TUBES [[id number]] Collected: 07/20/25 2032    Order Status: Sent Lab Status: In process Updated: 07/20/25 2053    Specimen: Blood, Venous     Narrative:      The following orders were created for panel order EXTRA TUBES.  Procedure                               Abnormality         Status                     ---------                               -----------         ------                     Gold Top Hold[[id number]]                                   In process                   Please view results for these tests on the individual orders.    Light Green Top Hold [[id number]]     Order Status: Sent Lab Status: No result     Specimen: Blood, Venous            Medications:  Reconciled Home Medications:      Medication List        START taking these medications      apixaban 5 mg Tab  Commonly known as:  ELIQUIS  Take 1 tablet (5 mg total) by mouth 2 (two) times daily.     atorvastatin 20 MG tablet  Commonly known as: LIPITOR  Take 2 tablets (40 mg total) by mouth every evening for cholesterol  Replaces: rosuvastatin 5 MG tablet     cefdinir 300 MG capsule  Commonly known as: OMNICEF  Take 1 capsule by mouth every 12 (twelve) hours for 10 days     doxycycline 100 MG tablet  Commonly known as: VIBRA-TABS  Take 1 tablet by mouth every 12 (twelve) hours for 5 days     metoprolol succinate 100 MG 24 hr tablet  Commonly known as: TOPROL-XL  Take 1 tablet (100 mg total) by mouth once daily.  Start taking on: July 26, 2025     pantoprazole 40 MG tablet  Commonly known as: PROTONIX  Take 1 tablet (40 mg total) by mouth 2 (two) times daily.            CHANGE how you take these medications      losartan 25 MG tablet  Commonly known as: COZAAR  Take 1 tablet (25 mg total) by mouth once daily.  What changed:   medication strength  how much to take            CONTINUE taking these medications      aspirin 81 MG EC tablet  Commonly known as: ECOTRIN  Take 81 mg by mouth once daily.     SEMGLEE(INSULIN GLARG-YFGN) unit/mL (3 mL) Inpn  Generic drug: insulin glargine-yfgn  Inject 38 Units into the skin every evening.            STOP taking these medications      famotidine 20 MG tablet  Commonly known as: PEPCID     rosuvastatin 5 MG tablet  Commonly known as: CRESTOR  Replaced by: atorvastatin 20 MG tablet              Indwelling Lines/Drains at time of discharge:   Lines/Drains/Airways       None                       Time spent on the discharge of patient: 40 minutes         Sandra Baker MD  Department of Hospital Medicine  [facility] Tele[facility]

## 2025-07-25 NOTE — PROGRESS NOTES
Pharmacist Discharge Note     Patient is a 69 y.o. male who presented to hospital with Sepsis with acute organ dysfunction.     Admission medication history done on 7/20/25 by Sapna Nam    Discharge Medication List:   Reconciled Home Medications:      Medication List        START taking these medications      atorvastatin 20 MG tablet  Commonly known as: LIPITOR  Take 2 tablets (40 mg total) by mouth every evening for cholesterol  Replaces: rosuvastatin 5 MG tablet     cefdinir 300 MG capsule  Commonly known as: OMNICEF  Take 1 capsule by mouth every 12 (twelve) hours for 10 days     doxycycline 100 MG tablet  Commonly known as: VIBRA-TABS  Take 1 tablet by mouth every 12 (twelve) hours for 5 days     ELIQUIS 5 mg Tab  Generic drug: apixaban  Take 1 tablet (5 mg total) by mouth 2 (two) times daily.     metoprolol succinate 100 MG 24 hr tablet  Commonly known as: TOPROL-XL  Take 1 tablet (100 mg total) by mouth once daily.  Start taking on: July 26, 2025     pantoprazole 40 MG tablet  Commonly known as: PROTONIX  Take 1 tablet (40 mg total) by mouth 2 (two) times daily.            CHANGE how you take these medications      losartan 25 MG tablet  Commonly known as: COZAAR  Take 1 tablet (25 mg total) by mouth once daily.  What changed:   medication strength  how much to take            CONTINUE taking these medications      aspirin 81 MG EC tablet  Commonly known as: ECOTRIN  Take 81 mg by mouth once daily.     SEMGLEE(INSULIN GLARG-YFGN) unit/mL (3 mL) Inpn  Generic drug: insulin glargine-yfgn  Inject 38 Units into the skin every evening.            STOP taking these medications      famotidine 20 MG tablet  Commonly known as: PEPCID     rosuvastatin 5 MG tablet  Commonly known as: CRESTOR  Replaced by: atorvastatin 20 MG tablet              Medication Changes Made: See above    Follow-up Recommendations:     The discharge counseling and medication review were performed by Jose L Watson on 7/25/25 with  patient.     The following prescriptions were sent to [facility]:  Apixaban, Atorastatin, Protonix, Losartan, Doxycycline, Cefdinir and Toprol XL.  They were delivered to room while I was doing discharge counseling.      Thank you for allowing me to participate in this patient's care,  Jose L Watson  Pharmacist  EXT. [phone]

## 2025-07-25 NOTE — ASSESSMENT & PLAN NOTE
ROJAS is likely due to acute tubular necrosis caused by sepsis. Baseline creatinine is 0.8. Most recent creatinine and eGFR are listed below.  Recent Labs     07/23/25  0432 07/24/25  0241   CREATININE 1.02 1.19   EGFRNORACEVR 81 67      Plan  - ROJAS is worsening. Will continue current treatment  - Avoid nephrotoxins and renally dose meds for GFR listed above  - Monitor urine output, serial BMP, and adjust therapy as needed  - Continue with IV fluid- 0.9% NS@100 ml/hr

## 2025-07-26 LAB
BACTERIA BLD CULT: NORMAL
BACTERIA BLD CULT: NORMAL

## 2025-07-28 LAB
OHS QRS DURATION: 86 MS
OHS QTC CALCULATION: 428 MS

## 2025-08-12 ENCOUNTER — OFFICE VISIT (OUTPATIENT)
Dept: CARDIOLOGY | Facility: CLINIC | Age: 70
End: 2025-08-12
Payer: MEDICARE

## 2025-08-12 VITALS
HEART RATE: 66 BPM | SYSTOLIC BLOOD PRESSURE: 125 MMHG | WEIGHT: 187.81 LBS | DIASTOLIC BLOOD PRESSURE: 82 MMHG | BODY MASS INDEX: 26.89 KG/M2 | HEIGHT: 70 IN | OXYGEN SATURATION: 98 %

## 2025-08-12 DIAGNOSIS — N17.9 AKI (ACUTE KIDNEY INJURY): ICD-10-CM

## 2025-08-12 DIAGNOSIS — I48.91 NEW ONSET A-FIB: ICD-10-CM

## 2025-08-12 DIAGNOSIS — I42.9 CARDIOMYOPATHY, UNSPECIFIED TYPE: Primary | ICD-10-CM

## 2025-08-12 DIAGNOSIS — I50.9 NEW ONSET OF CONGESTIVE HEART FAILURE: ICD-10-CM

## 2025-08-12 DIAGNOSIS — E78.2 MIXED HYPERLIPIDEMIA: ICD-10-CM

## 2025-08-12 DIAGNOSIS — R93.1 LOW LEFT VENTRICULAR EJECTION FRACTION: ICD-10-CM

## 2025-08-12 DIAGNOSIS — I10 BENIGN ESSENTIAL HTN: Primary | ICD-10-CM

## 2025-08-12 LAB
OHS QRS DURATION: 82 MS
OHS QTC CALCULATION: 413 MS

## 2025-08-12 PROCEDURE — 3288F FALL RISK ASSESSMENT DOCD: CPT | Mod: CPTII,,, | Performed by: REGISTERED NURSE

## 2025-08-12 PROCEDURE — 1159F MED LIST DOCD IN RCRD: CPT | Mod: CPTII,,, | Performed by: REGISTERED NURSE

## 2025-08-12 PROCEDURE — 3079F DIAST BP 80-89 MM HG: CPT | Mod: CPTII,,, | Performed by: REGISTERED NURSE

## 2025-08-12 PROCEDURE — 1101F PT FALLS ASSESS-DOCD LE1/YR: CPT | Mod: CPTII,,, | Performed by: REGISTERED NURSE

## 2025-08-12 PROCEDURE — 99999 PR PBB SHADOW E&M-EST. PATIENT-LVL IV: CPT | Mod: PBBFAC,,, | Performed by: REGISTERED NURSE

## 2025-08-12 PROCEDURE — 3074F SYST BP LT 130 MM HG: CPT | Mod: CPTII,,, | Performed by: REGISTERED NURSE

## 2025-08-12 PROCEDURE — 93005 ELECTROCARDIOGRAM TRACING: CPT | Mod: PBBFAC | Performed by: STUDENT IN AN ORGANIZED HEALTH CARE EDUCATION/TRAINING PROGRAM

## 2025-08-12 PROCEDURE — 93010 ELECTROCARDIOGRAM REPORT: CPT | Mod: S$PBB,,, | Performed by: STUDENT IN AN ORGANIZED HEALTH CARE EDUCATION/TRAINING PROGRAM

## 2025-08-12 PROCEDURE — 3008F BODY MASS INDEX DOCD: CPT | Mod: CPTII,,, | Performed by: REGISTERED NURSE

## 2025-08-12 PROCEDURE — 99213 OFFICE O/P EST LOW 20 MIN: CPT | Mod: S$PBB,,, | Performed by: REGISTERED NURSE

## 2025-08-12 PROCEDURE — 1111F DSCHRG MED/CURRENT MED MERGE: CPT | Mod: CPTII,,, | Performed by: REGISTERED NURSE

## 2025-08-12 PROCEDURE — 4010F ACE/ARB THERAPY RXD/TAKEN: CPT | Mod: CPTII,,, | Performed by: REGISTERED NURSE

## 2025-08-12 PROCEDURE — 3046F HEMOGLOBIN A1C LEVEL >9.0%: CPT | Mod: CPTII,,, | Performed by: REGISTERED NURSE

## 2025-08-12 PROCEDURE — 99214 OFFICE O/P EST MOD 30 MIN: CPT | Mod: PBBFAC | Performed by: REGISTERED NURSE

## 2025-08-12 RX ORDER — SODIUM CHLORIDE 0.9 % (FLUSH) 0.9 %
10 SYRINGE (ML) INJECTION
OUTPATIENT
Start: 2025-08-28

## 2025-08-22 DIAGNOSIS — I48.19 ATRIAL FIBRILLATION, PERSISTENT: ICD-10-CM

## 2025-08-22 DIAGNOSIS — I48.91 ATRIAL FIBRILLATION, UNSPECIFIED TYPE: Primary | ICD-10-CM
